# Patient Record
Sex: FEMALE | Race: WHITE | Employment: UNEMPLOYED | URBAN - METROPOLITAN AREA
[De-identification: names, ages, dates, MRNs, and addresses within clinical notes are randomized per-mention and may not be internally consistent; named-entity substitution may affect disease eponyms.]

---

## 2019-04-22 ENCOUNTER — APPOINTMENT (OUTPATIENT)
Dept: GENERAL RADIOLOGY | Age: 49
DRG: 885 | End: 2019-04-22
Payer: COMMERCIAL

## 2019-04-22 ENCOUNTER — HOSPITAL ENCOUNTER (INPATIENT)
Age: 49
LOS: 12 days | Discharge: HOME | DRG: 885 | End: 2019-05-04
Attending: EMERGENCY MEDICINE | Admitting: PSYCHIATRY & NEUROLOGY
Payer: COMMERCIAL

## 2019-04-22 DIAGNOSIS — R44.0 AUDITORY HALLUCINATIONS: ICD-10-CM

## 2019-04-22 DIAGNOSIS — R45.850 HOMICIDAL IDEATION: ICD-10-CM

## 2019-04-22 DIAGNOSIS — R45.851 SUICIDAL IDEATION: Primary | ICD-10-CM

## 2019-04-22 LAB
ABSOLUTE EOS #: 0 K/UL (ref 0–0.4)
ABSOLUTE IMMATURE GRANULOCYTE: ABNORMAL K/UL (ref 0–0.3)
ABSOLUTE LYMPH #: 1.5 K/UL (ref 1–4.8)
ABSOLUTE MONO #: 0.5 K/UL (ref 0.1–1.3)
ACETAMINOPHEN LEVEL: <5 UG/ML (ref 10–30)
ALBUMIN SERPL-MCNC: 4.8 G/DL (ref 3.5–5.2)
ALBUMIN/GLOBULIN RATIO: ABNORMAL (ref 1–2.5)
ALP BLD-CCNC: 61 U/L (ref 35–104)
ALT SERPL-CCNC: 14 U/L (ref 5–33)
AMPHETAMINE SCREEN URINE: NEGATIVE
ANION GAP SERPL CALCULATED.3IONS-SCNC: 22 MMOL/L (ref 9–17)
AST SERPL-CCNC: 16 U/L
BARBITURATE SCREEN URINE: NEGATIVE
BASOPHILS # BLD: 1 % (ref 0–2)
BASOPHILS ABSOLUTE: 0.1 K/UL (ref 0–0.2)
BENZODIAZEPINE SCREEN, URINE: NEGATIVE
BILIRUB SERPL-MCNC: 1.08 MG/DL (ref 0.3–1.2)
BUN BLDV-MCNC: 11 MG/DL (ref 6–20)
BUN/CREAT BLD: ABNORMAL (ref 9–20)
BUPRENORPHINE URINE: ABNORMAL
CALCIUM SERPL-MCNC: 9.4 MG/DL (ref 8.6–10.4)
CANNABINOID SCREEN URINE: NEGATIVE
CHLORIDE BLD-SCNC: 103 MMOL/L (ref 98–107)
CO2: 16 MMOL/L (ref 20–31)
COCAINE METABOLITE, URINE: NEGATIVE
CREAT SERPL-MCNC: 0.6 MG/DL (ref 0.5–0.9)
DIFFERENTIAL TYPE: ABNORMAL
EOSINOPHILS RELATIVE PERCENT: 0 % (ref 0–4)
ETHANOL PERCENT: <0.01 %
ETHANOL: <10 MG/DL
GFR AFRICAN AMERICAN: >60 ML/MIN
GFR NON-AFRICAN AMERICAN: >60 ML/MIN
GFR SERPL CREATININE-BSD FRML MDRD: ABNORMAL ML/MIN/{1.73_M2}
GFR SERPL CREATININE-BSD FRML MDRD: ABNORMAL ML/MIN/{1.73_M2}
GLUCOSE BLD-MCNC: 86 MG/DL (ref 70–99)
HCT VFR BLD CALC: 43.6 % (ref 36–46)
HEMOGLOBIN: 15.2 G/DL (ref 12–16)
IMMATURE GRANULOCYTES: ABNORMAL %
LYMPHOCYTES # BLD: 17 % (ref 24–44)
MCH RBC QN AUTO: 31.8 PG (ref 26–34)
MCHC RBC AUTO-ENTMCNC: 34.7 G/DL (ref 31–37)
MCV RBC AUTO: 91.5 FL (ref 80–100)
MDMA URINE: ABNORMAL
METHADONE SCREEN, URINE: POSITIVE
METHAMPHETAMINE, URINE: ABNORMAL
MONOCYTES # BLD: 5 % (ref 1–7)
NRBC AUTOMATED: ABNORMAL PER 100 WBC
OPIATES, URINE: NEGATIVE
OXYCODONE SCREEN URINE: NEGATIVE
PDW BLD-RTO: 14 % (ref 11.5–14.9)
PHENCYCLIDINE, URINE: NEGATIVE
PLATELET # BLD: 297 K/UL (ref 150–450)
PLATELET ESTIMATE: ABNORMAL
PMV BLD AUTO: 7.9 FL (ref 6–12)
POTASSIUM SERPL-SCNC: 4.2 MMOL/L (ref 3.7–5.3)
PROPOXYPHENE, URINE: ABNORMAL
RBC # BLD: 4.77 M/UL (ref 4–5.2)
RBC # BLD: ABNORMAL 10*6/UL
SALICYLATE LEVEL: <1 MG/DL (ref 3–10)
SEG NEUTROPHILS: 77 % (ref 36–66)
SEGMENTED NEUTROPHILS ABSOLUTE COUNT: 6.8 K/UL (ref 1.3–9.1)
SODIUM BLD-SCNC: 141 MMOL/L (ref 135–144)
TEST INFORMATION: ABNORMAL
TOTAL PROTEIN: 7 G/DL (ref 6.4–8.3)
TOXIC TRICYCLIC SC,BLOOD: ABNORMAL
TRICYCLIC ANTIDEP,URINE: NEGATIVE
TRICYCLIC ANTIDEPRESSANTS, UR: ABNORMAL
TSH SERPL DL<=0.05 MIU/L-ACNC: 2.31 MIU/L (ref 0.3–5)
WBC # BLD: 8.9 K/UL (ref 3.5–11)
WBC # BLD: ABNORMAL 10*3/UL

## 2019-04-22 PROCEDURE — 93005 ELECTROCARDIOGRAM TRACING: CPT

## 2019-04-22 PROCEDURE — 84443 ASSAY THYROID STIM HORMONE: CPT

## 2019-04-22 PROCEDURE — 85025 COMPLETE CBC W/AUTO DIFF WBC: CPT

## 2019-04-22 PROCEDURE — G0480 DRUG TEST DEF 1-7 CLASSES: HCPCS

## 2019-04-22 PROCEDURE — 36415 COLL VENOUS BLD VENIPUNCTURE: CPT

## 2019-04-22 PROCEDURE — 80307 DRUG TEST PRSMV CHEM ANLYZR: CPT

## 2019-04-22 PROCEDURE — 71046 X-RAY EXAM CHEST 2 VIEWS: CPT

## 2019-04-22 PROCEDURE — 1240000000 HC EMOTIONAL WELLNESS R&B

## 2019-04-22 PROCEDURE — 80053 COMPREHEN METABOLIC PANEL: CPT

## 2019-04-22 PROCEDURE — 99285 EMERGENCY DEPT VISIT HI MDM: CPT

## 2019-04-22 RX ORDER — LANOLIN ALCOHOL/MO/W.PET/CERES
50 CREAM (GRAM) TOPICAL DAILY
COMMUNITY
End: 2022-07-12 | Stop reason: ALTCHOICE

## 2019-04-22 RX ORDER — TRIFLUOPERAZINE HYDROCHLORIDE 2 MG/1
6 TABLET, FILM COATED ORAL 2 TIMES DAILY
Status: ON HOLD | COMMUNITY
End: 2019-04-23

## 2019-04-22 RX ORDER — ARIPIPRAZOLE 10 MG/1
10 TABLET ORAL DAILY
Status: ON HOLD | COMMUNITY
End: 2019-05-04 | Stop reason: HOSPADM

## 2019-04-22 SDOH — HEALTH STABILITY: MENTAL HEALTH: HOW OFTEN DO YOU HAVE A DRINK CONTAINING ALCOHOL?: NEVER

## 2019-04-22 ASSESSMENT — ENCOUNTER SYMPTOMS
VOMITING: 0
NAUSEA: 0
ABDOMINAL PAIN: 0
COUGH: 1

## 2019-04-23 PROBLEM — F20.9 SCHIZOPHRENIA (HCC): Status: ACTIVE | Noted: 2019-04-23

## 2019-04-23 PROCEDURE — 90792 PSYCH DIAG EVAL W/MED SRVCS: CPT | Performed by: NURSE PRACTITIONER

## 2019-04-23 PROCEDURE — 6370000000 HC RX 637 (ALT 250 FOR IP): Performed by: NURSE PRACTITIONER

## 2019-04-23 PROCEDURE — 1240000000 HC EMOTIONAL WELLNESS R&B

## 2019-04-23 RX ORDER — MAGNESIUM HYDROXIDE/ALUMINUM HYDROXICE/SIMETHICONE 120; 1200; 1200 MG/30ML; MG/30ML; MG/30ML
30 SUSPENSION ORAL EVERY 6 HOURS PRN
Status: DISCONTINUED | OUTPATIENT
Start: 2019-04-23 | End: 2019-05-04 | Stop reason: HOSPADM

## 2019-04-23 RX ORDER — ARIPIPRAZOLE 10 MG/1
10 TABLET ORAL DAILY
Status: DISCONTINUED | OUTPATIENT
Start: 2019-04-23 | End: 2019-04-24

## 2019-04-23 RX ORDER — NICOTINE 21 MG/24HR
1 PATCH, TRANSDERMAL 24 HOURS TRANSDERMAL DAILY
Status: DISCONTINUED | OUTPATIENT
Start: 2019-04-23 | End: 2019-04-24

## 2019-04-23 RX ORDER — TRIFLUOPERAZINE HYDROCHLORIDE 1 MG/1
6 TABLET, FILM COATED ORAL NIGHTLY
Status: DISCONTINUED | OUTPATIENT
Start: 2019-04-23 | End: 2019-05-01

## 2019-04-23 RX ORDER — ERGOCALCIFEROL 1.25 MG/1
50000 CAPSULE ORAL
Status: DISCONTINUED | OUTPATIENT
Start: 2019-04-23 | End: 2019-05-04 | Stop reason: HOSPADM

## 2019-04-23 RX ORDER — HYDROXYZINE 50 MG/1
50 TABLET, FILM COATED ORAL 3 TIMES DAILY PRN
Status: DISCONTINUED | OUTPATIENT
Start: 2019-04-23 | End: 2019-05-04 | Stop reason: HOSPADM

## 2019-04-23 RX ORDER — NICOTINE POLACRILEX 4 MG
15 LOZENGE BUCCAL SEE ADMIN INSTRUCTIONS
Status: ON HOLD | COMMUNITY
End: 2019-04-23

## 2019-04-23 RX ORDER — LANOLIN ALCOHOL/MO/W.PET/CERES
50 CREAM (GRAM) TOPICAL DAILY
Status: DISCONTINUED | OUTPATIENT
Start: 2019-04-23 | End: 2019-05-04 | Stop reason: HOSPADM

## 2019-04-23 RX ORDER — TRIFLUOPERAZINE HYDROCHLORIDE 2 MG/1
6 TABLET, FILM COATED ORAL NIGHTLY
COMMUNITY
End: 2020-09-22 | Stop reason: ALTCHOICE

## 2019-04-23 RX ORDER — ACETAMINOPHEN 325 MG/1
650 TABLET ORAL EVERY 4 HOURS PRN
Status: DISCONTINUED | OUTPATIENT
Start: 2019-04-23 | End: 2019-05-04 | Stop reason: HOSPADM

## 2019-04-23 RX ORDER — BENZTROPINE MESYLATE 1 MG/ML
2 INJECTION INTRAMUSCULAR; INTRAVENOUS 2 TIMES DAILY PRN
Status: DISCONTINUED | OUTPATIENT
Start: 2019-04-23 | End: 2019-05-04 | Stop reason: HOSPADM

## 2019-04-23 RX ORDER — TRAZODONE HYDROCHLORIDE 50 MG/1
50 TABLET ORAL NIGHTLY PRN
Status: DISCONTINUED | OUTPATIENT
Start: 2019-04-23 | End: 2019-05-04 | Stop reason: HOSPADM

## 2019-04-23 RX ADMIN — PYRIDOXINE HCL TAB 50 MG 50 MG: 50 TAB at 15:43

## 2019-04-23 RX ADMIN — VORTIOXETINE 10 MG: 10 TABLET, FILM COATED ORAL at 15:43

## 2019-04-23 RX ADMIN — ARIPIPRAZOLE 10 MG: 10 TABLET ORAL at 15:43

## 2019-04-23 RX ADMIN — Medication 1 MG: at 20:04

## 2019-04-23 RX ADMIN — TRIFLUOPERAZINE HYDROCHLORIDE 6 MG: 1 TABLET, FILM COATED ORAL at 20:08

## 2019-04-23 ASSESSMENT — SLEEP AND FATIGUE QUESTIONNAIRES
DIFFICULTY ARISING: NO
DIFFICULTY FALLING ASLEEP: YES
DIFFICULTY STAYING ASLEEP: YES
DO YOU HAVE DIFFICULTY SLEEPING: NO
AVERAGE NUMBER OF SLEEP HOURS: 7
SLEEP PATTERN: DIFFICULTY FALLING ASLEEP;EARLY AWAKENING;RESTLESSNESS
DO YOU USE A SLEEP AID: YES
RESTFUL SLEEP: YES

## 2019-04-23 ASSESSMENT — PAIN SCALES - GENERAL: PAINLEVEL_OUTOF10: 0

## 2019-04-23 ASSESSMENT — LIFESTYLE VARIABLES
HISTORY_ALCOHOL_USE: NO
HISTORY_ALCOHOL_USE: NO

## 2019-04-23 ASSESSMENT — PAIN - FUNCTIONAL ASSESSMENT: PAIN_FUNCTIONAL_ASSESSMENT: 0-10

## 2019-04-23 NOTE — CARE COORDINATION
BHI Biopsychosocial Assessment    Current Level of Psychosocial Functioning     Independent: x  Dependent:   Minimal Assist:      Psychosocial High Risk Factors (check all that apply)    Unable to obtain meds:    Chronic illness/pain:     Substance abuse:    Lack of Family Support:    Financial stress:    Isolation: x  Inadequate Community Resources: x  Suicide attempt(s): x  Not taking medications:     Victim of crime:    Developmental Delay:    Unable to manage personal needs:    Age 72 or older:    Homeless:    No transportation:    Readmission within 30 days:    Unemployment: x  Traumatic Event:        Psychiatric Advanced Directives: None reported    Family to Involve in Treatment: None reported    Sexual Orientation: ARMINDA    Patient Strengths: Pt has supportive family, is linked with outpatient provider. Patient Barriers: Pt is isolative. Opiate Education: N/A, pt denies opiate use    CMHC/mental health history: Pt is linked for outpatient services with [address] [place] [address]. Plan of Care   medication management, group/individual therapies, family meetings, psycho -education, treatment team meetings to assist with stabilization    Initial Discharge Plan: Pt to stabilize with medication, return home, continue outpatient services with [facility]      Clinical Summary:      Pt is a 51year old  female who presented to the ED with SI and AH, however pt did not verbalize having a plan. Pt states that she is medication compliant at this time. Pt is linked to [address] [place] [address]. Pt states that she lives with her  and daughter, whom is supportive. Pt does not disclose if she has a form of legal income. Pt states that she does not have a Hx of AoD use. Pt states that she does not have abuse issues from her past. Pt states that she does have MI in her family. Pt states that she has [address]. Pt states that she has graduated from [employer] and completed a bachelors in education.  Pt states that she is currently having AH, does not disclose what she hears. Pt denies VH, SI and HI. Pt states that she has attempted suicide in the past by OD at ages 22 and 24.

## 2019-04-23 NOTE — ED NOTES
Pt appears to be anxious as evidence by heavy breathing, clenching of pt's fists and toes. Pt stated pt felt like she was having a hard time breathing and swallowing. SW provided deep breathing psychoeducational intervention to assist client in reducing anxious symptoms. ED Dr richard.

## 2019-04-23 NOTE — BH NOTE
not disclose information )  Delusions: No(ARMINDA)  Delusions: Persecution  Memory:Normal: No  Memory: Poor Recent, Poor Remote  Insight and Judgment: No  Insight and Judgment: Poor Judgment, Poor Insight, Unrealistic, Unmotivated  Present Suicidal Ideation: (S) Other(See comment)(ARMINDA; PT would not answer question; When asked to contract for safety, pt nooded her head yes)  Present Homicidal Ideation: (S) Other(See comment)(ARMINDA; PT would not answer question; When asked to contract for safety, pt nooded her head yes)    Tobacco Screening:  Practical Counseling, on admission, liliana X, if applicable and completed (first 3 are required if patient doesn't refuse):            ( )  Recognizing danger situations (included triggers and roadblocks)                    ( )  Coping skills (new ways to manage stress, exercise, relaxation techniques, changing routine, distraction)                                                           ( )  Basic information about quitting (benefits of quitting, techniques in how to quit, available resources  ( ) Referral for counseling faxed to [facility]                                           ( ) Patient refused counseling  (x ) Patient has not smoked in the last 30 days      Pt admitted with followings belongings:  Dentures: None  Vision - Corrective Lenses: Glasses  Hearing Aid: None  Jewelry: Ring(2 rings)  Clothing: Footwear, Jacket / coat, Pants, Shirt, Pajamas, Undergarments (Comment), Other (Comment)(blue long sleeve shirt, hair tie,bra, brown bag, white pants w/string, red and blue jacket)  Were All Patient Medications Collected?: Not Applicable  Other Valuables: Money (Comment)($0.07)       Valuables placed in safe in security envelope, number:  [id number]. Patient's home medications were n/a. Patient oriented to surroundings and program expectations and copy of patient rights given. Received admission packet:  yes. Consents reviewed, signed yes. Refused no. Patient verbalize understanding:  yes. Patient education on precautions: yes      Upon admission, pt was a poor historian and was selective with her answers pertaining to her mental/physical health. PT was A&Ox3, and was able to voluntairly sign in after explaining the form to her. When RN began his assessment, PT started displaying increased thought blocking, only answering certain questions. When questions pertained to her mental/physical health, Pt would increasingly avoid eye contact and gaze around the room while Harlene Patience was waiting for response, refusing to answer certain questions. Pt refused most of her physical assessment. PT was changed out; Pt was wanded for metal objects on unit. Pt would not answer any homicidal or suicidal questions pertaining to her ideations, but pt did nod her head yes when she was asked to contract for her safety, agreeing to seek out health care staff if her thoughts to harm self/others increased. PT did admit to voices telling her things but would not disclose what these things where. PT admitted to both depression and anxiety. PT stated she sleeps and eats okay. PT denied any recent drinking or drug use. PT denied being a current tobacco user. IT was reported via ed that pt may not have taken all of her medications this day. When RN asked ED if this could be validated, they were unable to validate this. PT was questioned about her medications upon admit and when asked, pt shook her head yes to taking her medications that day. When reviewing what medications these were, pt would not answer RN, and began gazing around the room again. When RN asked what pharmacy pt utilized, PT was able to immediately respond and reported using [facility] [email or web address] for her medications which is closed at this time. PT was accepting of PM food and ate a meal. PT was shown to her room, pt laid down, and when asked, no issues were expressed at that time.  PT safety will continue per hospital protocol.                          Gail Monique RN

## 2019-04-23 NOTE — PROGRESS NOTES

## 2019-04-23 NOTE — PLAN OF CARE
[facility]  Initial Interdisciplinary Treatment Plan NO      Original treatment plan Date & Time: 4/23/19 0930    Admission Type:  Admission Type: Involuntary(Pinked, pt signed in)    Reason for admission:   Reason for Admission: (+Suicidal Ideations, +Homicidal Ideations, +command auditory hallucinations;  Pt would not disclose any futher information pertaining to this; also reported medications not working)    Estimated Length of Stay:  5-7days  Estimated Discharge Date: to be determined by physician    PATIENT STRENGTHS:  Patient Strengths:Strengths: Connection to output provider, Positive Support(harbor; +supportive ; reported med compliant)  Patient Strengths and Limitations:Limitations: Difficulty problem solving/relies on others to help solve problems, Tendency to isolate self, Apathetic / unmotivated, Difficult relationships / poor social skills, Unrealistic self-view, Demonstrates discomfort with /lack of social skills  Addictive Behavior: Addictive Behavior  In the past 3 months, have you felt or has someone told you that you have a problem with:  : None  Do you have a history of Chemical Use?: No(denied current/recent)  Do you have a history of Alcohol Use?: No(denied current/recent)  Do you have a history of Street Drug Abuse?: No(denied current/recent)  Histroy of Prescripton Drug Abuse?: No(denied current/recent)  Medical Problems:  Past Medical History:   Diagnosis Date    Bipolar 1 disorder ([facility] [address]     Depressed      Status EXAM:Status and Exam  Normal: No  Facial Expression: Avoids Gaze, Expressionless, Flat, Worried, Sad  Affect: Constricted, Stable  Level of Consciousness: Alert  Mood:Normal: No  Mood: Depressed, Anxious, Sad, Suspicious, Other (Comment)  Motor Activity:Normal: Yes  Interview Behavior: Evasive, Uncooperative/Withdrawn  Preception: Brice to Person, Brice to Time, Brice to Place  Attention:Normal: No  Attention: Distractible, Unable to Concentrate  Thought Processes: (S) Blocking  Thought Content:Normal: No  Thought Content: Paranoia, Poverty of Content, Preoccupations  Hallucinations: Auditory (Comment), Command(Comment)(Pt would not disclose information )  Delusions: No(ARMINDA)  Delusions: Persecution  Memory:Normal: No  Memory: Poor Recent, Poor Remote  Insight and Judgment: No  Insight and Judgment: Poor Judgment, Poor Insight, Unrealistic, Unmotivated  Present Suicidal Ideation: (S) Other(See comment)(ARMINDA; PT would not answer question; When asked to contract for safety, pt nooded her head yes)  Present Homicidal Ideation: (S) Other(See comment)(ARMINDA; PT would not answer question;  When asked to contract for safety, pt nooded her head yes)    EDUCATION:   Learner Progress Toward Treatment Goals: reviewed group plans and strategies for care    Method:group therapy, medication compliance, individualized assessments and care planning    Outcome: needs reinforcement    PATIENT GOALS: to be discussed with patient within 72 hours    PLAN/TREATMENT RECOMMENDATIONS:     continue group therapy , medications compliance, goal setting, individualized assessments and care, continue to monitor pt on unit      SHORT-TERM GOALS:   Time frame for Short-Term Goals: 5-7 days    LONG-TERM GOALS:  Time frame for Long-Term Goals: 6 months  Members Present in Team Meeting: See [address] [place] [address]

## 2019-04-23 NOTE — GROUP NOTE
Group Therapy Note    Date: April 23    Group Start Time: 1430  Group End Time: 1510  Group Topic: Cognitive Skills    STCZ MERY Catherine, CTRS    Pt did not attend RT group at 1430 d/t resting in room despite staff invitation to attend.       Signature:  Newton Baker

## 2019-04-23 NOTE — ED NOTES
Provisional Diagnosis:  Schizophrenia    Psychosocial and Contextual Factors: Pt has issues with social enviroment. Pt has issues with relationships. C-SSRS Summary:    Patient: X    Family:     Agency: X (EPIC)    Present Suicidal Behavior:     Verbal: Pt is suicidal.     Attempt:     Past Suicidal Behavior:     Verbal:     Attempt: Pt attempted suicide at age 22 by trying to OD on pills. Self- Injurious/ Self-Mutilation:  ARMINDA due to pt's paranoia. Trauma History: ARMINDA due to pt's paranoia. Protective Factors: Pt has support. Pt has housing. Pt is linked. Pt has insurance. Risk Factors: Pt has poor judgement and coping skills. Pt does not take her medication properly at times. Substance Abuse: Pt denies    Clinical Summary:  Horace Couch is a 51year old female who presents to the ED via pt's . Pt is guarded. Pt is withdrawn. Pt's thought process is blocking. Pt admits to having SI/HI. When asked for further detail about pt's SI/HI, pt responds by saying \"I can't talk about it. \" Pt reports hearing voices but cannot say what they are saying to pt. Pt attempted suicide at age 22 by trying to OD on pills. Pt denies the use of alcohol and illegal drugs. Pt has been diagnosed with Schizoaffective disorder in the past. Pt reports pt is med compliant but sometimes misses her dose of Abilify. Pt's medications dont seem to be working for pt anymore, per pt's . Pt is linked with [facility]. Pt reports past psychiatric admissions at [facility]. Pt reports poor sleep and pt has had a decrease in her appetite, per pt's . Level of Care Disposition:.JOHANNA consulted with Dallas RUSSO CNP from psychiatry. Pt accepted for an inpatient admission to the [facility] for safety and stabilization.

## 2019-04-23 NOTE — GROUP NOTE
Group Therapy Note    Date: April 23    Group Start Time: 1600  Group End Time: 1630  Group Topic: Group Therapy    [facility] MERY [facility]        Group Therapy Note    Attendees: 7         Patient's Goal:  Increased positive coping skills    Notes:  Coping skills group    Status After Intervention:  Improved    Participation Level:  Active Listener    Participation Quality: Appropriate      Speech:  normal      Thought Process/Content: Logical      Affective Functioning: Congruent      Mood: euthymic      Level of consciousness:  Alert and Oriented x4      Response to Learning: Able to verbalize current knowledge/experience      Endings: None Reported    Modes of Intervention: Education      Discipline Responsible: [address], [employer]      Signature:  Bisi Chao

## 2019-04-23 NOTE — GROUP NOTE
Group Therapy Note    Date: April 23    Group Start Time: 0900  Group End Time: 6244  Group Topic: [address], [place]        Group Therapy Note    Attendees: 3/15         Patient's Goal:  To increase socialization    Notes:  Patient attended group and participated fully    Status After Intervention:  Improved    Participation Level:  Active Listener and Interactive    Participation Quality: Appropriate and Attentive      Speech:  normal      Thought Process/Content: Logical      Affective Functioning: Blunted      Mood: depressed      Level of consciousness:  Alert and Oriented x4      Response to Learning: Progressing to goal      Endings: None Reported    Modes of Intervention: Education, Socialization, Clarifying, Problem-solving and Reality-testing      Discipline Responsible: Psychoeducational Specialist      Signature:  Inga Ponce

## 2019-04-23 NOTE — BH NOTE
Psychiatric Admission Note Nurse Practitioner     Lety Hare is a 51 y.o. female who was voluntarily admitted from the [place] [address] for suicidal and homicidal thoughts. Today Joy Meckel is interviewed in the day room. She has been observed pacing in the milieu and does not socialize with others. Upon approach, she is paranoid and suspicious with avoidant eye contact. She is dressed in [address] She is admitting to voices to \"be patient\" and to \"kill\". She states taht she has only missed one dose of medication but that when she does not take her medication, the voices come back. She is admitting to fleeting SI, no plans, HI but will not disclose additional details. She becomes tearful when answering questions and becomes guarded and withdrawn. She admits to depression that is controlled with her medication denying anxiety. She has significant thought blocking and when asked regarding scheduled drug use, she shut down and refused to continue our interview. This is the result of a positive methadone result in her urine. Chart and medications reviewed. Therapeutic support, empathetic care and psycho education provided greater than 20 minutes. At this time there is no safe alternative other than inpatient care. Past Psychiatric History   Patient reports current outpatient psychiatric linkage. . Reported history of psychiatric inpatient hospitalizations. Reported history of suicide attempts. History of Substance Abuse     Patient denies cigarette, marijuana and street drug use although her urine was positive for methodone admitting to social ETOH use.     Family History of psychiatric disorders    Family history: positive for Depression and schizophrenia      Medical History   Allergies:  Strawberry extract   Past Medical History:   Diagnosis Date    Bipolar 1 disorder ([facility] [address]     Depressed       Past Surgical History:   Procedure Laterality Date    APPENDECTOMY      CHOLECYSTECTOMY      HERNIA REPAIR  LAPAROSCOPY         Neurologic Exam      Mental Status   Oriented to person, place, and time. Oriented to city, area, street and number. Oriented to country. Registration: recalls 3 of 3 objects. Recall at 5 minutes: recalls 3 of 3 objects. Follows 3 step commands. Attention: normal. Concentration: normal.   Speech: speech is normal   Level of consciousness: alert  Knowledge: good. Able to perform simple calculations. Able to name object. Able to read. Able to repeat. Able to write. Normal comprehension.      Cranial Nerves   Cranial nerves II through XII intact.      Motor Exam   Muscle bulk: normal  Overall muscle tone: normal     Strength   Strength 5/5 throughout.      Sensory Exam   Light touch normal.      Gait, Coordination, and Reflexes      Normal     Coordination   Romberg: negative     Tremor   Resting tremor: absent  Intention tremor: absent  Action tremor: absent     Reflexes   Right brachioradialis: 2+  Left brachioradialis: 2+  Right biceps: 2+  Left biceps: 2+  Right triceps: 2+  Left triceps: 2+  Right patellar: 2+  Left patellar: 2+  Right achilles: 2+  Left achilles: 2+  Right : 2+  Left : 2+    SOCIAL HISTORY: Patient lives with her  and 25 y.o. Daughter, she has a 21 y.o. Daughter who is in college. She has a Bachelors degree and is a stay-at-home Mom.   Social History     Socioeconomic History    Marital status:      Spouse name: Not on file    Number of children: Not on file    Years of education: Not on file    Highest education level: Not on file   Occupational History    Not on file   Social Needs    Financial resource strain: Not on file    Food insecurity:     Worry: Not on file     Inability: Not on file    Transportation needs:     Medical: Not on file     Non-medical: Not on file   Tobacco Use    Smoking status: Never Smoker    Smokeless tobacco: Never Used   Substance and Sexual Activity    Alcohol use: Never     Frequency: Never    Drug

## 2019-04-23 NOTE — ED NOTES
SW informed pt, psychiatry would like for pt to be admitted to the [facility] and asked if pt is willing to voluntarily sign self in. Pt began clenching/unclenching fists and toes while breathing heavy. Pt continuously looking at writer then back down to the ground and at pt's fingers. Pt remained non verbal and would not answer any of this writer's questions. ED Dr richard.

## 2019-04-23 NOTE — H&P
HISTORY and [address] [address] [address]       NAME:  Catherine Blakely  MRN: [id number]   YOB: 1970   Date: 4/23/2019   Age: 51 y.o. Gender: female     COMPLAINT AND PRESENT HISTORY:      Catherine Blakely is 51 y.o.,  female, admitted because of Schizoaffective Disorder/ Schizophrenia. Patient has auditory hallucinations, patient hears command voices to kill self, but not others. Patient admits to having auditory hallucinations for years, recently the voices have gotten worse. Patient denies any  visual hallucinations. No tactile hallucinations  Pt has suicidal ideation. Patient states that she doesn't want to answer what plans she has. Pt doesn't deny  any homicidal ideation. Pt  States that she may have ideations, \" I try to have self control\" . Pt has a history of previous suicide attempts by overdose at age 22. Pt has poor insight. Patient has periods of thought blocking and gazing . Patient at times would not answer questions asked. Pt has poor sleep, loss of appetite, poor concentration and memory. Patient denies any current alcohol or substance abuse. Patient lives alone with spouse and daughter. .   Pt has been  compliant with the psychiatric medications. No somatic complaints.        DIAGNOSTIC RESULTS   Labs:  CBC:   Recent Labs     04/22/19 2045   WBC 8.9   HGB 15.2        BMP:    Recent Labs     04/22/19 2045      K 4.2      CO2 16*   BUN 11   CREATININE 0.60   GLUCOSE 86     Hepatic:   Recent Labs     04/22/19 2045   AST 16   ALT 14   BILITOT 1.08   ALKPHOS 64         PAST MEDICAL HISTORY     Past Medical History:   Diagnosis Date    Bipolar 1 disorder ([facility] [address]     Depressed        Pt denies any history of Diabetes mellitus type 2, hypertension, stroke, heart disease, COPD, Asthma, GERD, HLD, Cancer, Seizures,Thyroid disease, Kidney Disease, Hepatitis, TB.    SURGICAL HISTORY       Past Surgical History:   Procedure Laterality Date  APPENDECTOMY      CHOLECYSTECTOMY      HERNIA REPAIR      LAPAROSCOPY         FAMILY HISTORY     History reviewed. No pertinent family history. SOCIAL HISTORY       Social History     Socioeconomic History    Marital status:      Spouse name: None    Number of children: None    Years of education: None    Highest education level: None   Occupational History    None   Social Needs    Financial resource strain: None    Food insecurity:     Worry: None     Inability: None    Transportation needs:     Medical: None     Non-medical: None   Tobacco Use    Smoking status: Never Smoker    Smokeless tobacco: Never Used   Substance and Sexual Activity    Alcohol use: Never     Frequency: Never    Drug use: Not Currently    Sexual activity: None   Lifestyle    Physical activity:     Days per week: None     Minutes per session: None    Stress: None   Relationships    Social connections:     Talks on phone: None     Gets together: None     Attends Buddhism service: None     Active member of club or organization: None     Attends meetings of clubs or organizations: None     Relationship status: None    Intimate partner violence:     Fear of current or ex partner: None     Emotionally abused: None     Physically abused: None     Forced sexual activity: None   Other Topics Concern    None   Social History Narrative    None           REVIEW OF SYSTEMS      Allergies   Allergen Reactions    Strawberry Extract        No current facility-administered medications on file prior to encounter.       Current Outpatient Medications on File Prior to Encounter   Medication Sig Dispense Refill    trifluoperazine (STELAZINE) 2 MG tablet Take 6 mg by mouth nightly      VORTIoxetine (TRINTELLIX) 10 MG TABS tablet Take 10 mg by mouth daily      ARIPiprazole (ABILIFY) 10 MG tablet Take 10 mg by mouth daily      Melatonin-Pyridoxine (MELATIN PO) Take 10 mg by mouth      vitamin B-6 (PYRIDOXINE) 50 MG tablet Take 50 mg by mouth daily      vitamin D (CHOLECALCIFEROL) 1000 UNIT TABS tablet Take 50,000 Units by mouth Twice a Week                         General health:  Fairly good. No fever or chills. Skin:  No itching, redness or rash. Head, eyes, ears, nose, throat:  No headache, epistaxis, rhinorrhea hearing loss or sore throat. Neck:  No pain, stiffness or masses. Cardiovascular/Respiratory system:  No chest pain, palpitation, shortness of breath, coughing or expectoration. Gastrointestinal tract: No abdominal pain, nausea, vomiting, diarrhea or constipation. Genitourinary:  No burning on micturition. No hesitancy, urgency, frequency or discoloration of urine. Locomotor:  No bone or joint pains. No swelling or deformities. Neuropsychiatric:  See HPI. GENERAL PHYSICAL EXAM:     Vitals: BP (!) 130/47   Pulse 87   Temp 98.7 °F (37.1 °C)   Resp 14   Ht 5' 6\" (1.676 m)   Wt 183 lb (83 kg)   SpO2 97%   BMI 29.54 kg/m²  Body mass index is 29.54 kg/m². Pt was examined with a nurse present in the room. GENERAL APPEARANCE:  Brenden Sheehan is 51 y.o.,  female, moderately obese, nourished, conscious, alert. Does not appear to be distress or pain at this time. SKIN:  Warm, dry, no cyanosis or jaundice. HEAD:  Normocephalic, atraumatic, no swelling or tenderness. EYES:  Pupils equal, reactive to light, Conjunctiva is clear, EOMs intact debra. eyelids WNL. EARS:  No discharge, no marked hearing loss. NOSE:  No rhinorrhea, epistaxis or septal deformity. THROAT:  Not congested. No ulceration bleeding or discharge. NECK:  No stiffness, trachea central.  No palpable masses or L.N.      CHEST:  Symmetrical and equal on expansion. HEART:  Regular rate and rhythm. S1 > S2, No audible murmurs or gallops. LUNGS:  Equal on expansion, normal breath sounds. No adventitious sounds. ABDOMEN:  Obese.  Soft on palpation. No localized tenderness. No guarding or rigidity. No palpable organomegaly. LYMPHATICS:  No palpable cervical Lymphadenopathy. LOCOMOTOR, BACK AND SPINE:  No tenderness or deformities. EXTREMITIES:  Symmetrical, no pretibial edema. Maicols sign negative. No discoloration or ulcerations. NEUROLOGIC:  The patient is conscious, alert, oriented,Cranial nerve II-XII intact, taste was not examined. No apparent focal sensory or motor deficits. Muscle strength equal Teddy. No facial droop, tongue protrudes centrally, no slurring of the speech. Noted involuntary tremors to the right hand. PROVISIONAL DIAGNOSES:      Active Problems:    Schizophrenia ([facility] [address]    Suicidal ideation    Schizoaffective disorder, depressive type ([facility] [address]  Resolved Problems:    * No resolved hospital problems.  *      RAFAELA CARLSON - CNP on 4/23/2019 at 4:49 PM

## 2019-04-23 NOTE — GROUP NOTE
Group Therapy Note    Date: April 23    Group Start Time: 1100  Group End Time: 1130  Group Topic: Cognitive Skills    GALO Higginbotham, CTRS        Group Therapy Note    Pt did not attend Therapeutic Recreation at 1100 d/t resting in room despite staff invitation to attend.

## 2019-04-23 NOTE — ED PROVIDER NOTES
[facility]  eMERGENCY dEPARTMENT eNCOUnter      Pt Name: Courtney Gerardo  MRN: [id number]  [place] 1970  Date of evaluation: 4/22/19    CHIEF COMPLAINT       Chief Complaint   Patient presents with    Mental Health Problem     HISTORY OF PRESENT ILLNESS   HPI 51 y.o. female presents with complaints of hallucinations and suicidal thoughts. The patient states that over the last month she's been having auditory hallucinations. She's been hearing voices. She states that voices are telling her to harm herself and others. When asked about suicidal intention or plan she shakes her head yes, when I asked her to elaborate she says \"I can't tell you\". She denies any attempt at self-harm at this time. She refuses to discuss her homicidal thoughts. She denies any attempt of pill overdose or ingestion. She admits to missing some of her doses of Abilify. She reports a previous history of suicide attempt when she was 18 by pill overdose. She denies any recreational drug abuse. The patient states that she was recently sick with a cold, but she said that those symptoms have resolved though she still does have a mild cough. REVIEW OF SYSTEMS     Review of Systems   Constitutional: Negative for chills and fever. HENT: Negative for congestion. Eyes: Negative for visual disturbance. Respiratory: Positive for cough. Cardiovascular: Negative for chest pain. Gastrointestinal: Negative for abdominal pain, nausea and vomiting. Genitourinary: Negative for dysuria. Musculoskeletal: Negative for arthralgias. Skin: Negative for rash. Neurological: Negative for headaches. Hematological: Negative for adenopathy. Psychiatric/Behavioral: Positive for decreased concentration, dysphoric mood, hallucinations, sleep disturbance and suicidal ideas. Negative for self-injury. The patient is nervous/anxious.       PAST MEDICAL HISTORY     Past Medical History:   Diagnosis Date    Bipolar 1 disorder ([facility] [address]     Depressed        SURGICAL HISTORY       Past Surgical History:   Procedure Laterality Date    APPENDECTOMY      CHOLECYSTECTOMY      HERNIA REPAIR      LAPAROSCOPY         CURRENT MEDICATIONS       Current Discharge Medication List      CONTINUE these medications which have NOT CHANGED    Details   trifluoperazine (STELAZINE) 2 MG tablet Take 6 mg by mouth 2 times daily Take 3 2mg tablet daily      VORTIoxetine (TRINTELLIX) 10 MG TABS tablet Take 10 mg by mouth daily      ARIPiprazole (ABILIFY) 10 MG tablet Take 10 mg by mouth daily      Melatonin-Pyridoxine (MELATIN PO) Take 10 mg by mouth      vitamin B-6 (PYRIDOXINE) 50 MG tablet Take 50 mg by mouth daily      vitamin D (CHOLECALCIFEROL) 1000 UNIT TABS tablet Take 1,000 Units by mouth daily             ALLERGIES     has No Known Allergies. FAMILY HISTORY     has no family status information on file. SOCIAL HISTORY      reports that she has never smoked. She does not have any smokeless tobacco history on file. She reports that she has current or past drug history. She reports that she does not drink alcohol.     PHYSICAL EXAM     INITIAL VITALS: /66   Pulse 82   Temp 98.4 °F (36.9 °C)   Resp 16   Ht 5' 6\" (1.676 m)   Wt 183 lb (83 kg)   SpO2 97%   BMI 29.54 kg/m²   Gen.: NAD  Head: Normocephalic, atraumatic  Eye: Pupils equal round reactive to light, no conjunctivitis  Neck: No JVD or adenopathy   Heart: Regular rate and rhythm no murmurs  Lungs: Clear to auscultation bilaterally, no respiratory distress  Chest wall: No crepitus, no tenderness palpation  Abdomen: Soft, nontender, nondistended, with no peritoneal signs  Neurologic: Patient is alert and oriented x3, motor and sensation is intact in all 4 extremities,  fluent speech  Extremities: Full range of motion, no cyanosis, no edema, no signs of trauma, no tenderness to palpation    MEDICAL DECISION MAKING:     MDM   51year-old presenting with complaints of hallucinations, suicidal/homicidal thoughts. Does not appear intoxicated. Denies any attempt at self-harm or pill overdose. Will check her CBC, renal function, electrolytes, a tox screen, we'll get a baseline EKG, given this cough and URI we'll get a chest x-ray. Hospital course:    Laboratory studies reviewed and there are no significant abnormalities. Chest x-ray is unremarkable. Patient is medically clear for psychiatric evaluation. DIAGNOSTIC RESULTS     EKG: All EKG's are interpreted by the Emergency Department Physician who either signs or Co-signs this chart in the absence of a cardiologist.    EKG shows a sinus rhythm. HR is 70, , QRS 86, , no DANIEL, No STD, No TWI, the axis is normal.      RADIOLOGY:All plain film, CT, MRI, and formal ultrasound images (except ED bedside ultrasound) are read by the radiologist and the images and interpretations are directly viewed by the emergency physician. XR CHEST STANDARD (2 VW)   Final Result   No acute cardiopulmonary process. LABS: All lab results were reviewed by myself, and all abnormals are listed below.   Labs Reviewed   CBC WITH AUTO DIFFERENTIAL - Abnormal; Notable for the following components:       Result Value    Seg Neutrophils 77 (*)     Lymphocytes 17 (*)     All other components within normal limits   COMPREHENSIVE METABOLIC PANEL - Abnormal; Notable for the following components:    CO2 16 (*)     Anion Gap 22 (*)     All other components within normal limits   TOX SCR, BLD, ED - Abnormal; Notable for the following components:    Salicylate Lvl <1 (*)     Acetaminophen Level <5 (*)     All other components within normal limits   URINE DRUG SCREEN - Abnormal; Notable for the following components:    Methadone Screen, Urine POSITIVE (*)     All other components within normal limits   TSH WITH REFLEX   DRUG SCREEN TRICYCLIC URINE       EMERGENCY DEPARTMENT COURSE:   Vitals:    Vitals:    04/22/19 2003 04/22/19 2309   BP: 116/84 125/66   Pulse: 115 82   Resp: 16 16   Temp: 98.4 °F (36.9 °C) 98.4 °F (36.9 °C)   TempSrc: Oral    SpO2: 96% 97%   Weight: 183 lb (83 kg)    Height: 5' 6\" (1.676 m)        The patient was given the following medications while in the emergency department:  No orders of the defined types were placed in this encounter. -------------------------  CRITICAL CARE:   CONSULTS: IP CONSULT TO HOSPITALIST  PROCEDURES: Procedures     FINAL IMPRESSION      1. Suicidal ideation    2. Homicidal ideation    3. Auditory hallucinations          DISPOSITION/PLAN   DISPOSITION Decision To Admit 04/22/2019 10:36:44 PM      PATIENT REFERRED TO:  No follow-up provider specified.     DISCHARGE MEDICATIONS:  Current Discharge Medication List            Andre Penn MD  Attending Emergency Physician                      Andre Penn MD  04/22/19 0118

## 2019-04-24 LAB
ABSOLUTE EOS #: 0 K/UL (ref 0–0.4)
ABSOLUTE IMMATURE GRANULOCYTE: ABNORMAL K/UL (ref 0–0.3)
ABSOLUTE LYMPH #: 1.3 K/UL (ref 1–4.8)
ABSOLUTE MONO #: 0.3 K/UL (ref 0.1–1.3)
ALBUMIN SERPL-MCNC: 4.2 G/DL (ref 3.5–5.2)
ALBUMIN/GLOBULIN RATIO: ABNORMAL (ref 1–2.5)
ALP BLD-CCNC: 54 U/L (ref 35–104)
ALT SERPL-CCNC: 12 U/L (ref 5–33)
ANION GAP SERPL CALCULATED.3IONS-SCNC: 14 MMOL/L (ref 9–17)
AST SERPL-CCNC: 18 U/L
BASOPHILS # BLD: 1 % (ref 0–2)
BASOPHILS ABSOLUTE: 0 K/UL (ref 0–0.2)
BILIRUB SERPL-MCNC: 1.03 MG/DL (ref 0.3–1.2)
BUN BLDV-MCNC: 7 MG/DL (ref 6–20)
BUN/CREAT BLD: ABNORMAL (ref 9–20)
CALCIUM SERPL-MCNC: 9.3 MG/DL (ref 8.6–10.4)
CHLORIDE BLD-SCNC: 106 MMOL/L (ref 98–107)
CHOLESTEROL/HDL RATIO: 2.7
CHOLESTEROL: 118 MG/DL
CO2: 22 MMOL/L (ref 20–31)
CREAT SERPL-MCNC: 0.41 MG/DL (ref 0.5–0.9)
DIFFERENTIAL TYPE: ABNORMAL
EOSINOPHILS RELATIVE PERCENT: 1 % (ref 0–4)
ESTIMATED AVERAGE GLUCOSE: 80 MG/DL
GFR AFRICAN AMERICAN: >60 ML/MIN
GFR NON-AFRICAN AMERICAN: >60 ML/MIN
GFR SERPL CREATININE-BSD FRML MDRD: ABNORMAL ML/MIN/{1.73_M2}
GFR SERPL CREATININE-BSD FRML MDRD: ABNORMAL ML/MIN/{1.73_M2}
GLUCOSE BLD-MCNC: 108 MG/DL (ref 70–99)
HBA1C MFR BLD: 4.4 % (ref 4–6)
HCT VFR BLD CALC: 40.2 % (ref 36–46)
HDLC SERPL-MCNC: 44 MG/DL
HEMOGLOBIN: 13.9 G/DL (ref 12–16)
IMMATURE GRANULOCYTES: ABNORMAL %
LDL CHOLESTEROL: 65 MG/DL (ref 0–130)
LYMPHOCYTES # BLD: 24 % (ref 24–44)
MCH RBC QN AUTO: 31 PG (ref 26–34)
MCHC RBC AUTO-ENTMCNC: 34.6 G/DL (ref 31–37)
MCV RBC AUTO: 89.6 FL (ref 80–100)
MONOCYTES # BLD: 6 % (ref 1–7)
NRBC AUTOMATED: ABNORMAL PER 100 WBC
PDW BLD-RTO: 13.6 % (ref 11.5–14.9)
PLATELET # BLD: 228 K/UL (ref 150–450)
PLATELET ESTIMATE: ABNORMAL
PMV BLD AUTO: 7.8 FL (ref 6–12)
POTASSIUM SERPL-SCNC: 4 MMOL/L (ref 3.7–5.3)
RBC # BLD: 4.49 M/UL (ref 4–5.2)
RBC # BLD: ABNORMAL 10*6/UL
SEG NEUTROPHILS: 68 % (ref 36–66)
SEGMENTED NEUTROPHILS ABSOLUTE COUNT: 3.6 K/UL (ref 1.3–9.1)
SODIUM BLD-SCNC: 142 MMOL/L (ref 135–144)
THYROXINE, FREE: 1.58 NG/DL (ref 0.93–1.7)
TOTAL PROTEIN: 6.1 G/DL (ref 6.4–8.3)
TRIGL SERPL-MCNC: 43 MG/DL
TSH SERPL DL<=0.05 MIU/L-ACNC: 3 MIU/L (ref 0.3–5)
VLDLC SERPL CALC-MCNC: NORMAL MG/DL (ref 1–30)
WBC # BLD: 5.2 K/UL (ref 3.5–11)
WBC # BLD: ABNORMAL 10*3/UL

## 2019-04-24 PROCEDURE — 6370000000 HC RX 637 (ALT 250 FOR IP): Performed by: NURSE PRACTITIONER

## 2019-04-24 PROCEDURE — 36415 COLL VENOUS BLD VENIPUNCTURE: CPT

## 2019-04-24 PROCEDURE — 1240000000 HC EMOTIONAL WELLNESS R&B

## 2019-04-24 PROCEDURE — 80053 COMPREHEN METABOLIC PANEL: CPT

## 2019-04-24 PROCEDURE — 99232 SBSQ HOSP IP/OBS MODERATE 35: CPT | Performed by: NURSE PRACTITIONER

## 2019-04-24 PROCEDURE — 85025 COMPLETE CBC W/AUTO DIFF WBC: CPT

## 2019-04-24 PROCEDURE — 84439 ASSAY OF FREE THYROXINE: CPT

## 2019-04-24 PROCEDURE — 90833 PSYTX W PT W E/M 30 MIN: CPT | Performed by: NURSE PRACTITIONER

## 2019-04-24 PROCEDURE — 84443 ASSAY THYROID STIM HORMONE: CPT

## 2019-04-24 PROCEDURE — 83036 HEMOGLOBIN GLYCOSYLATED A1C: CPT

## 2019-04-24 PROCEDURE — 80061 LIPID PANEL: CPT

## 2019-04-24 RX ORDER — ARIPIPRAZOLE 15 MG/1
15 TABLET ORAL DAILY
Status: DISCONTINUED | OUTPATIENT
Start: 2019-04-25 | End: 2019-04-30

## 2019-04-24 RX ADMIN — TRIFLUOPERAZINE HYDROCHLORIDE 6 MG: 1 TABLET, FILM COATED ORAL at 21:22

## 2019-04-24 RX ADMIN — Medication 1 MG: at 21:23

## 2019-04-24 RX ADMIN — VORTIOXETINE 10 MG: 10 TABLET, FILM COATED ORAL at 08:50

## 2019-04-24 RX ADMIN — ARIPIPRAZOLE 10 MG: 10 TABLET ORAL at 08:50

## 2019-04-24 RX ADMIN — PYRIDOXINE HCL TAB 50 MG 50 MG: 50 TAB at 08:51

## 2019-04-24 RX ADMIN — HYDROXYZINE HYDROCHLORIDE 50 MG: 50 TABLET, FILM COATED ORAL at 08:51

## 2019-04-24 NOTE — GROUP NOTE
Group Therapy Note    Date: April 23    Group Start Time: 2015  Group End Time: 2045  Group Topic: Wrap-Up    GALO Giron RN        Group Therapy Note    Attendees: 4/12         Patient did not participate in  [address] [place] [address] group,  After invitation given. Patient remain seclusive to self. Every 15 minute checks maintained.

## 2019-04-24 NOTE — GROUP NOTE
Group Therapy Note    Date: April 24    Group Start Time: 1000  Group End Time: 6277  Group Topic: Group Therapy    [facility]    BRENNEN Yarbrough LSW        Group Therapy Note    Attendees: 5         Patient's Goal:  Patient will demonstrate increased interpersonal interaction    Notes:  Pt was an active participant    Status After Intervention:  Unchanged    Participation Level:  Active Listener and Interactive    Participation Quality: Appropriate, Attentive, Sharing and Supportive      Speech:  normal      Thought Process/Content: Logical      Affective Functioning: Congruent      Mood: anxious      Level of consciousness:  Alert, Oriented x4 and Attentive      Response to Learning: Able to verbalize current knowledge/experience, Able to change behavior and Progressing to goal      Endings: None Reported    Modes of Intervention: Support, Socialization, Clarifying and Problem-solving      Discipline Responsible: /Counselor      Signature:  BRENNEN Yarbrough LSW

## 2019-04-24 NOTE — PLAN OF CARE
[facility]  Day 3 Interdisciplinary Treatment Plan NOTE    Review Date & Time: 4/24/19  2700    Admission Type:   Admission Type: Involuntary(Pinked, pt signed in)    Reason for admission:  Reason for Admission: (+Suicidal Ideations, +Homicidal Ideations, +command auditory hallucinations;  Pt would not disclose any futher information pertaining to this; also reported medications not working)  Estimated Length of Stay: 5-7 days  Estimated Discharge Date Update: to be determined by physician    PATIENT STRENGTHS:  Patient Strengths Strengths: Connection to output provider, Positive Support, Social Skills, Medication Compliance  Patient Strengths and Limitations:Limitations: Difficulty problem solving/relies on others to help solve problems  Addictive Behavior:Addictive Behavior  In the past 3 months, have you felt or has someone told you that you have a problem with:  : None  Do you have a history of Chemical Use?: No  Do you have a history of Alcohol Use?: No  Do you have a history of Street Drug Abuse?: No  Histroy of Prescripton Drug Abuse?: No  Medical Problems:  Past Medical History:   Diagnosis Date    B[facility] ([facility]     Depressed        Risk:  Fall RiskTotal: 53  Qasim Scale Qasim Scale Score: 22  BVC Total: 0  Change in scores no Changes to plan of Care no    Status EXAM:   Status and Exam  Normal: No  Facial Expression: Expressionless, Avoids Gaze  Affect: Blunt  Level of Consciousness: Alert  Mood:Normal: No  Mood: Depressed, Anxious  Motor Activity:Normal: No  Motor Activity: Decreased  Interview Behavior: Cooperative, Evasive  Preception: [place] to Person, [place] to Time, [place] to Place, [place] to Situation  Attention:Normal: No  Attention: Distractible  Thought Processes: Blocking  Thought Content:Normal: No  Thought Content: Poverty of Content, Preoccupations  Hallucinations: None  Delusions: No  Delusions: Persecution  Memory:Normal: No  Memory: Poor Recent  Insight and Judgment: No  Insight and Judgment: Poor Judgment, Poor Insight, Unmotivated  Present Suicidal Ideation: No  Present Homicidal Ideation: No    Daily Assessment Last Entry:   Daily Sleep (WDL): Within Defined Limits         Patient Currently in Pain: Denies  Daily Nutrition (WDL): Within Defined Limits    Patient Monitoring:  Frequency of Checks: 4 times per hour, close    Psychiatric Symptoms:   Depression Symptoms  Depression Symptoms: Change in energy level, Loss of interest, Feelings of helplessness, Feelings of hopelessess, Isolative  Anxiety Symptoms  Anxiety Symptoms: Generalized  Sangeetha Symptoms  Sangeetha Symptoms: No problems reported or observed. Psychosis Symptoms  Delusion Type: No problems reported or observed. Suicide Risk CSSR-S:  1) Within the past month, have you wished you were dead or wished you could go to sleep and not wake up? : Yes(ARMINDA; PT would not disclose when asked, but pt stated yes in ED; pt did nod yes/agree to seek out healthcare staff if her stressors were to be to much/cannot contract for safety)  2) Have you actually had any thoughts of killing yourself? : (ARMINDA; PT would not disclose when asked, but pt stated yes in ED; pt did nod yes/agree to seek out healthcare staff if her stressors were to be to much/cannot contract for safety)  3) Have you been thinking about how you might kill yourself? : (ARMINDA; PT would not disclose when asked, but pt stated yes in ED; pt did nod yes/agree to seek out healthcare staff if her stressors were to be to much/cannot contract for safety)  5) Have you started to work out or worked out the details of how to kill yourself?  Do you intend to carry out this plan? : (ARMINDA; PT would not disclose when asked, but pt stated yes in ED; pt did nod yes/agree to seek out healthcare staff if her stressors were to be to much/cannot contract for safety)  6) Have you ever done anything, started to do anything, or prepared to do anything to end your life?: (ARMINDA; PT would not disclose when asked, but pt stated yes in ED; pt did nod yes/agree to seek out healthcare staff if her stressors were to be to much/cannot contract for safety)  Change in Result no Change in Plan of care no      EDUCATION:   EDUCATION:   Learner Progress Toward Treatment Goals: Reviewed results and recommendations of this team, Reviewed group plan and strategies, Reviewed signs, symptoms and risk of self harm and violent behavior, Reviewed goals and plan of care    Method:small group, individual verbal education    Outcome:verbalized by patient, but needs reinforcement to obtain goals    PATIENT GOALS:  Short term: To not listen to the voices and have a meeting with   Long term:  To follow up with [place]     PLAN/TREATMENT RECOMMENDATIONS UPDATE: continue with group therapies, increased socialization, continue planning for after discharge goals, continue with medication compliance    SHORT-TERM GOALS UPDATE:   Time frame for Short-Term Goals: 5-7 days    LONG-TERM GOALS UPDATE:   Time frame for Long-Term Goals: 6 months  Members Present in Team Meeting: See Signature Sheet    THOM Damian

## 2019-04-24 NOTE — PROGRESS NOTES
Psychiatric Progress Note Nurse Practitioner  Pertinent History & Psychiatric Examination    HPI: Godl De La Paz is a 51 y.o. female who was voluntarily admitted from the [place] [address] for suicidal and homicidal thoughts.      Today Delmar Livingston is interviewed in treatment team and again in the day room. She continues to pace in the milieu much less today. She does not socialize with others stating that she is unsure regarding the milieu. She alternates between periods of clarity and thought blocking. When asked questions that make her uncomfortable, she shuts down and her thought blocking increases. She is admitting to voices to [facility]" and she is able to rationalize that this would not take place. She states that she is trying hard to not listen to every voice in her head. She states that she has only missed one dose of medication but that when she does not take her medication, the voices come back. She is denying SI and HI admitting to depression and anxiety. Delmar Livingston continues to report racing thoughts and is an obsessive worrier. She is currently concerned regarding her relationship with God. She is not religiously preoccupied and she is not seen responding to internal stimuli. Chart and medications reviewed. Therapeutic support, empathetic care and psycho education provided greater than 20 minutes. At this time there is no safe alternative other than inpatient care.         Complaints of Pain: none  Functioning Relationships: good support system      Mental Status Evaluation:  Orientation: alertness: alert   Mood:. anxious, constricted and depressed      Affect:  blunted, constricted and flat      Appearance:  age appropriate   Activity:  Restless & fidgety   Gait/Posture: Normal   Speech:  soft   Thought Process:  circumstantial   Thought Content:  hallucinations   Sensorium:  person, place, time/date and situation   Cognition:  grossly intact   Memory: intact   Insight:  fair   Judgment: fair   Suicidal

## 2019-04-24 NOTE — PLAN OF CARE
Problem: Depressive Behavior With or Without Suicide Precautions:  Goal: Ability to disclose and discuss suicidal ideas will improve  Description  Ability to disclose and discuss suicidal ideas will improve  Outcome: Ongoing  Note:   Pt is accepting of 1:1 talk time with staff. Pt admits to depression and anxiety. Pt admits to voices but she cant make sense of them. Pt is thought blocking. Pt is isolative to self. Reassurance and support provided. Q15 min checks maintained. Pt remains safe at this time. Problem: Depressive Behavior With or Without Suicide Precautions:  Goal: Absence of self-harm  Description  Absence of self-harm  Outcome: Ongoing  Note:   No self harm behaviors  noted. Pt denies SI/HI and verbally agrees to approach staff if thoughts of self harm arises. Reassurance and support provided. Q15 min checks maintained. Pt remains safe at this time.

## 2019-04-24 NOTE — GROUP NOTE
Group Therapy Note    Date: April 24    Group Start Time: 8140  Group End Time: 1038  Group Topic: Community Meeting    GALO MERY OSBORNE    [place], [place]        Group Therapy Note    Attendees: 3         Patient's Goal:  Pt's goal was to get dressed, and take her medications     Notes:  Pt attended and participated in group     Status After Intervention:  Unchanged    Participation Level:  Active Listener and Interactive    Participation Quality: Appropriate, Attentive and Sharing      Speech:  normal      Thought Process/Content: Logical      Affective Functioning: Constricted/Restricted      Mood: anxious      Level of consciousness:  Alert and Attentive      Response to Learning: Progressing to goal      Endings: None Reported    Modes of Intervention: Education, Support, Socialization, Problem-solving and Reality-testing      Discipline Responsible: Psychoeducational Specialist      Signature:  Ron Pretty

## 2019-04-25 PROCEDURE — 90833 PSYTX W PT W E/M 30 MIN: CPT | Performed by: NURSE PRACTITIONER

## 2019-04-25 PROCEDURE — 99232 SBSQ HOSP IP/OBS MODERATE 35: CPT | Performed by: NURSE PRACTITIONER

## 2019-04-25 PROCEDURE — 6370000000 HC RX 637 (ALT 250 FOR IP): Performed by: NURSE PRACTITIONER

## 2019-04-25 PROCEDURE — 1240000000 HC EMOTIONAL WELLNESS R&B

## 2019-04-25 RX ADMIN — ARIPIPRAZOLE 15 MG: 15 TABLET ORAL at 08:04

## 2019-04-25 RX ADMIN — VORTIOXETINE 10 MG: 10 TABLET, FILM COATED ORAL at 08:04

## 2019-04-25 RX ADMIN — PYRIDOXINE HCL TAB 50 MG 50 MG: 50 TAB at 08:05

## 2019-04-25 RX ADMIN — ERGOCALCIFEROL 50000 UNITS: 1.25 CAPSULE ORAL at 08:04

## 2019-04-25 RX ADMIN — Medication 1 MG: at 21:53

## 2019-04-25 RX ADMIN — TRIFLUOPERAZINE HYDROCHLORIDE 6 MG: 1 TABLET, FILM COATED ORAL at 21:53

## 2019-04-25 NOTE — PROGRESS NOTES
Psychiatric Progress Note Nurse Practitioner  Pertinent History & Psychiatric Examination    HPI: Today Yariel Ochoa is interviewed in the day room. Initially, she refuses to speak with provider today but after encouragment agrees to cooperate. She shows minimal improvement today. She continues to pace in the milieu much less today. She does not socialize with others stating that she is unsure regarding the milieu. She alternates between lperiods of clarity with longer periods of thought blocking. When asked questions that make her uncomfortable, she shuts down and her thought blocking increases. She continues to admit to voices to [facility]" and she is able to rationalize that this would not take place. She is denying SI and HI admitting to depression and anxiety. Yariel Ochoa continues to report racing thoughts and is an obsessive worrier. She reports that her Mother, Father and  visited last night. She has trouble remembering details of the interaction. She reports better sleep and fair appetite. Chart and medications reviewed. Therapeutic support, empathetic care and psycho education provided greater than 20 minutes. At this time there is no safe alternative other than inpatient care.     Complaints of Pain: none  Functioning Relationships: good support system      Mental Status Evaluation:  Orientation: alertness: alert   Mood:. anxious, constricted and depressed      Affect:  blunted, constricted and flat      Appearance:  age appropriate   Activity:  Restless & fidgety   Gait/Posture: Normal   Speech:  soft   Thought Process:  circumstantial   Thought Content:  hallucinations   Sensorium:  person, place, time/date and situation   Cognition:  grossly intact   Memory: intact   Insight:  fair   Judgment: fair   Suicidal Ideations: denies suicidal ideation   Homicidal Ideations: Negative for homicidal ideation      Medication Side Effects: absent       Attention Span attention span appeared shorter than expected for age     Clinical Assessment Medical Decision    Axis I:   Schizophrenia    Precautions with Justification:   None    Medication Review/Mgmt: Medications reviewed with changes    Medical Issues: See Chart    Assessment of Risk for Harm to Self/Others:  None    PLAN:  · Continue inpatient psychiatric treatment  · Supportive therapy with medication management. Reviewed risks and benefits as well as potential side effects with patient. · Continue home medications. · Increase Abilify to 15mg daily beginning 4/25/19. · Review medications for efficacy and side effects. · Therapeutic support and empathetic care provided greater than 20 minutes. · Engage in therapeutic activities and groups. · Follow up at [facility] after symptoms stabilized.       Anticipated Discharge Date: TBD    Patient's Response to Treatment: Bridgette Mcnulty  4/25/2019  10:17 AM

## 2019-04-25 NOTE — PLAN OF CARE
Problem: Anger Management/Homicidal Ideation:  Goal: Ability to verbalize frustrations and anger appropriately will improve  Description  Ability to verbalize frustrations and anger appropriately will improve  Outcome: Ongoing  No angry outburst noted this shift  patient denies homicidal ideations  Problem: Anger Management/Homicidal Ideation:  Goal: Absence of homicidal ideation  Description  Absence of homicidal ideation  Outcome: Ongoing  Denies homicidal ideations 15 minute visual safety checks continue  Problem: Depressive Behavior With or Without Suicide Precautions:  Goal: Ability to disclose and discuss suicidal ideas will improve  Description  Ability to disclose and discuss suicidal ideas will improve  Outcome: Ongoing   [facility] than  on [facility]  denies suicidal ideations  15 minute checks continue  Problem: Depressive Behavior With or Without Suicide Precautions:  Goal: Absence of self-harm  Description  Absence of self-harm  Outcome: Ongoing   Remains free of harm this shift

## 2019-04-25 NOTE — PLAN OF CARE
Problem: Anger Management/Homicidal Ideation:  Goal: Ability to verbalize frustrations and anger appropriately will improve  Description  Ability to verbalize frustrations and anger appropriately will improve  Outcome: Ongoing  Note:   Patient will improve ability to verbalize frustrations and anger appropriately     Problem: Anger Management/Homicidal Ideation:  Goal: Absence of homicidal ideation  Description  Absence of homicidal ideation  Outcome: Ongoing  Note:   Patient will be absent of homicidal ideation      Problem: Depressive Behavior With or Without Suicide Precautions:  Goal: Ability to disclose and discuss suicidal ideas will improve  Description  Ability to disclose and discuss suicidal ideas will improve  4/24/2019 2227 by Valorie Harrison  Outcome: Ongoing  Note:   Patient will improve ability to disclose and discuss suicidal ideas     Problem: Depressive Behavior With or Without Suicide Precautions:  Goal: Absence of self-harm  Description  Absence of self-harm  4/24/2019 2227 by Valorie Harrison  Outcome: Ongoing  Note:   Patient will be absent of self-harm

## 2019-04-25 NOTE — GROUP NOTE
Group Therapy Note    Date: April 25    Group Start Time: 1000  Group End Time: 5027  Group Topic: Psychotherapy    [facility]    THOM Jernigan        Group Therapy Note    Attendees: 6/16         Patient's Goal:  To discuss emotions and support system. Notes:  Pt participated in group    Status After Intervention:  Unchanged    Participation Level:  Active Listener    Participation Quality: Appropriate, Attentive and Sharing      Speech:  normal      Thought Process/Content: Logical      Affective Functioning: Congruent      Mood: anxious      Level of consciousness:  Alert, Oriented x4 and Attentive      Response to Learning: Able to verbalize current knowledge/experience, Able to verbalize/acknowledge new learning, Able to retain information and Progressing to goal      Endings: None Reported    Modes of Intervention: Education, Support and Socialization      Discipline Responsible: /Counselor      Signature:  THOM Jernigan

## 2019-04-25 NOTE — PLAN OF CARE
Patient maintains good appetite and adl's  is encouraged to attend group programming to learn new coping skills  safety maintained with 15 minute visual saftety checks patient takes medications as prescribed  is appropritate with staff and peers

## 2019-04-25 NOTE — GROUP NOTE
Group Therapy Note    Date: April 25    Group Start Time: 1430  Group End Time: 1510  Group Topic: Psychoeducation    GALO Tucker Pap, CTRS        Group Therapy Note  Pt did not attend Therapeutic Recreation at 1430 d/t resting in room despite staff invitation to attend.

## 2019-04-25 NOTE — GROUP NOTE
Group Therapy Note    Date: April 25    Group Start Time: 1100  Group End Time: 1544  Group Topic: Psychoeducation    [facility] [place], [place]        Group Therapy Note    Attendees: 5         Patient's Goal:  To demonstrate interpersonal interaction     Notes:  Pt attended and participated in group for 20 minutes. Pt asked to leave group early to prepare for visitation. Status After Intervention:  Unchanged    Participation Level:  Active Listener and Interactive    Participation Quality: Appropriate and Attentive      Speech:  normal      Thought Process/Content: Logical      Affective Functioning: Congruent      Mood: euthymic      Level of consciousness:  Alert and Attentive      Response to Learning: Progressing to goal      Endings: None Reported    Modes of Intervention: Socialization, Activity, Media and Reality-testing      Discipline Responsible: Psychoeducational Specialist      Signature:  Ezio Ferrari

## 2019-04-26 PROCEDURE — 6370000000 HC RX 637 (ALT 250 FOR IP): Performed by: NURSE PRACTITIONER

## 2019-04-26 PROCEDURE — 1240000000 HC EMOTIONAL WELLNESS R&B

## 2019-04-26 PROCEDURE — 99232 SBSQ HOSP IP/OBS MODERATE 35: CPT | Performed by: NURSE PRACTITIONER

## 2019-04-26 RX ADMIN — VORTIOXETINE 10 MG: 10 TABLET, FILM COATED ORAL at 08:47

## 2019-04-26 RX ADMIN — ARIPIPRAZOLE 15 MG: 15 TABLET ORAL at 08:47

## 2019-04-26 RX ADMIN — TRIFLUOPERAZINE HYDROCHLORIDE 6 MG: 1 TABLET, FILM COATED ORAL at 21:26

## 2019-04-26 RX ADMIN — Medication 1 MG: at 21:26

## 2019-04-26 RX ADMIN — PYRIDOXINE HCL TAB 50 MG 50 MG: 50 TAB at 14:52

## 2019-04-26 ASSESSMENT — PAIN SCALES - GENERAL: PAINLEVEL_OUTOF10: 0

## 2019-04-26 NOTE — GROUP NOTE
Group Therapy Note    Date: April 26    Group Start Time: 0900  Group End Time: 0920  Group Topic: [address] [place], [place]        Group Therapy Note    Attendees: 9/16         Patient's Goal:  Patient will demonstrate increased interpersonal interaction     Notes:  Patient attended group and participated. Status After Intervention:  Improved    Participation Level:  Active Listener and Interactive    Participation Quality: Appropriate, Attentive and Sharing      Speech:  normal      Thought Process/Content: Logical      Affective Functioning: Congruent      Mood: euthymic      Level of consciousness:  Alert and Oriented x4      Response to Learning: Progressing to goal      Endings: None Reported    Modes of Intervention: Education, Socialization, Clarifying, Problem-solving and Reality-testing      Discipline Responsible: Psychoeducational Specialist      Signature:  Frederick Duke

## 2019-04-26 NOTE — GROUP NOTE
Group Therapy Note    Date: April 26    Group Start Time: 1340  Group End Time: 1440  Group Topic: Psychoeducation    [id number] [facility], [address]        Group Therapy Note    Attendees: 9/14         Patient's Goal:   Patient will demonstrate increased interpersonal interaction    Notes:  Patient attended group and participated     Status After Intervention:  Improved    Participation Level:  Active Listener and Interactive    Participation Quality: Appropriate, Attentive and Sharing      Speech:  normal      Thought Process/Content: Logical      Affective Functioning: Congruent      Mood: euthymic      Level of consciousness:  Alert and Oriented x4      Response to Learning: Progressing to goal      Endings: None Reported    Modes of Intervention: Education, Socialization, Problem-solving, Activity and Reality-testing      Discipline Responsible: Psychoeducational Specialist      Signature:  Brandy Kincaid

## 2019-04-26 NOTE — PLAN OF CARE
Problem: Anger Management/Homicidal Ideation:  Goal: Ability to verbalize frustrations and anger appropriately will improve  Description  Ability to verbalize frustrations and anger appropriately will improve  4/25/2019 2250 by Jessica James RN  Note:   PT was accepting of 1:1 meet with RN. PT was accepting of PM program.  PT has been absent of any angry outbursts, and denied any current issues when asked. Pt Agreed to seek out health care staff if stressors were to become to be to much. Safety checks have been maintained every 15 minutes and at irregular intervals throughout the shift      Problem: Depressive Behavior With or Without Suicide Precautions:  Goal: Absence of self-harm  Description  Absence of self-harm  4/25/2019 2250 by Jessica James RN  Note:   PT was accepting of 1:1 meet with RN. PT remains free from any self harm, falls, or any other type of injury as of this time. PT denied any current issues when asked. PT was out on unit, but aloof to peers, sitting by herself for most of PM shift. PT was displaying increased thought blocking, unable to provide answers to some questions. PT is wearing non-skid stockings at the time of assessment. PT verbalizes understanding of individual fall risks and ways to prevent them. PT agreed to use the call light if needed. PT agreed to seek out health care staff if stressors or other issues were to become to be too much. Safety checks have been maintained every 15 minutes and at irregular intervals throughout this shift.

## 2019-04-26 NOTE — GROUP NOTE
Group Therapy Note    Date: April 26    Group Start Time: 1100  Group End Time: 1140  Group Topic: Cognitive Skills    GALO Shepherd, CTRS    Group Therapy Note    Pt did not attend Therapeutic Recreation d/t resting in room despite staff invitation to attend.

## 2019-04-26 NOTE — PLAN OF CARE
Problem: Anger Management/Homicidal Ideation:  Goal: Absence of homicidal ideation  Description  Absence of homicidal ideation  Outcome: Met This Shift  Note:   Denies homicidal ideations     Problem: Depressive Behavior With or Without Suicide Precautions:  Goal: Absence of self-harm  Description  Absence of self-harm  Outcome: Met This Shift  Note:   No self harm noted. Problem: Anger Management/Homicidal Ideation:  Goal: Ability to verbalize frustrations and anger appropriately will improve  Description  Ability to verbalize frustrations and anger appropriately will improve  Outcome: Ongoing  Note:   Patient is controlled and cooperative. Denies suicidal or homicidal ideations. Admits to voices. Patient has a very delayed response. Attends groups. Problem: Depressive Behavior With or Without Suicide Precautions:  Goal: Ability to disclose and discuss suicidal ideas will improve  Description  Ability to disclose and discuss suicidal ideas will improve  Outcome: Ongoing  Note:   Patient denies suicidal ideations at this time.

## 2019-04-26 NOTE — CARE COORDINATION
Pt, writer and pt's , Michael Mills, met to discharge discharge planning. Pt's  is concerned that pt's command hallucinations are getting much worse and he has had to have pt's parents in to \"babysit\" her while he is at work. Pt's  states he cannot have them taking care of pt as they are 80 and have health issues, themselves. Pt's  is requesting pt come up with an alternate friend/family member she feels comfortable with to be with her in their home, to discuss home health care or long term ECF placement. Pt's  would like SW to meet with pt and her mother, tomorrow, 4/27/19, at lunch time visiting hours to discuss those three options. Pt is agreeable to discuss.

## 2019-04-26 NOTE — PROGRESS NOTES
appeared shorter than expected for age     Clinical Assessment Medical Decision    Axis I:   Schizophrenia    Precautions with Justification:   None    Medication Review/Mgmt: Medications reviewed with changes    Medical Issues: See Chart    Assessment of Risk for Harm to Self/Others:  None    PLAN:  · Continue inpatient psychiatric treatment  · Supportive therapy with medication management. Reviewed risks and benefits as well as potential side effects with patient. · Continue home medications. · Increase Abilify to 15mg daily beginning 4/25/19. · Review medications for efficacy and side effects. · Therapeutic support and empathetic care provided. · Engage in therapeutic activities and groups. · Follow up at [facility] after symptoms stabilized.       Anticipated Discharge Date: TBD    Patient's Response to Treatment: Slow    Anaya Opal  4/26/2019  11:04 AM

## 2019-04-26 NOTE — GROUP NOTE
Group Therapy Note    Date: April 26    Group Start Time: 1000  Group End Time: 4165  Group Topic: Psychotherapy    GALO ORDONEZ Jyoti Kimber    THOM Stevens                                                                        Group Therapy Note    Date: April 26    Group Start Time: 1000  Group End Time: 1045  Group Topic: Psychotherapy    GALO Quiroga    THOM Stevens        Group Therapy Note    Attendees: 9/16      Patient's Goal:  To discuss emotions and support system    Notes:  Pt participated    Status After Intervention:  Unchanged    Participation Level:  Active Listener    Participation Quality: Appropriate, Attentive and Sharing      Speech:  normal      Thought Process/Content: Logical      Affective Functioning: Congruent      Mood: anxious      Level of consciousness:  Alert, Oriented x4 and Attentive      Response to Learning: Able to verbalize current knowledge/experience, Able to verbalize/acknowledge new learning, Able to retain information and Progressing to goal      Endings: None Reported    Modes of Intervention: Education, Support and Socialization      Discipline Responsible: /Counselor      Signature:  THOM Stevens

## 2019-04-26 NOTE — CARE COORDINATION
Writer met with pt regarding coping skills. Pt stated she would like to do something to decrease her anxiety and writer educated pt on keeping her mind occupied. Writer printed out some crossword puzzles for pt to complete. Writer phoned pt's LETHA Cheek on file, [phone], and set up family meeting for this date at 0.

## 2019-04-27 PROCEDURE — 6370000000 HC RX 637 (ALT 250 FOR IP): Performed by: NURSE PRACTITIONER

## 2019-04-27 PROCEDURE — 1240000000 HC EMOTIONAL WELLNESS R&B

## 2019-04-27 RX ADMIN — Medication 1 MG: at 21:06

## 2019-04-27 RX ADMIN — ARIPIPRAZOLE 15 MG: 15 TABLET ORAL at 08:08

## 2019-04-27 RX ADMIN — TRIFLUOPERAZINE HYDROCHLORIDE 6 MG: 1 TABLET, FILM COATED ORAL at 21:06

## 2019-04-27 RX ADMIN — PYRIDOXINE HCL TAB 50 MG 50 MG: 50 TAB at 08:08

## 2019-04-27 RX ADMIN — MAGNESIUM HYDROXIDE 30 ML: 400 SUSPENSION ORAL at 09:02

## 2019-04-27 RX ADMIN — VORTIOXETINE 10 MG: 10 TABLET, FILM COATED ORAL at 08:08

## 2019-04-27 ASSESSMENT — PAIN SCALES - GENERAL: PAINLEVEL_OUTOF10: 0

## 2019-04-27 NOTE — BH NOTE
Patient did not participate in  [facility], [facility] group,  After invitation given. Patient remain seclusive to self. Every 15 minute checks maintained.

## 2019-04-27 NOTE — GROUP NOTE
Group Therapy Note    Date: April 27    Group Start Time: 1330  Group End Time: 4349  Group Topic: Recreational    [facility]    BRENNEN Bosch LSW        Group Therapy Note    Attendees: 10         Patient's Goal:  Pt will demonstrate increased interpersonal interaction. Notes:  Pt is making progress as evidenced by participating in group activity and engaging with others in a supportive environment. Status After Intervention:  Improved    Participation Level:  Active Listener and Interactive    Participation Quality: Appropriate, Attentive, Sharing and Supportive      Speech:  normal      Thought Process/Content: Logical      Affective Functioning: Congruent      Mood: stable      Level of consciousness:  Alert, Oriented x4 and Attentive      Response to Learning: Able to verbalize current knowledge/experience, Able to verbalize/acknowledge new learning, Able to retain information, Capable of insight, Able to change behavior and Progressing to goal      Endings: None Reported    Modes of Intervention: Support, Socialization and Activity      Discipline Responsible: /Counselor      Signature:  BRENNEN Bosch LSW

## 2019-04-27 NOTE — PLAN OF CARE
Problem: Anger Management/Homicidal Ideation:  Goal: Ability to verbalize frustrations and anger appropriately will improve  Description  Ability to verbalize frustrations and anger appropriately will improve  4/26/2019 2140 by Joseph Harley LPN  Outcome: Ongoing     Problem: Anger Management/Homicidal Ideation:  Goal: Absence of homicidal ideation  Description  Absence of homicidal ideation  4/26/2019 2140 by Joseph Harley LPN  Outcome: Ongoing     Problem: Depressive Behavior With or Without Suicide Precautions:  Goal: Ability to disclose and discuss suicidal ideas will improve  Description  Ability to disclose and discuss suicidal ideas will improve  4/26/2019 2140 by Joseph Harley LPN  Outcome: Ongoing   Pt admits to hearing voices, but does not discuss content. Thoughts are loose and disorganized and pt has difficulty tracking conversations and staying on topic during discussion. Will cont to reorient to reality and provide support and reassurance as needed. Pt denies SI/HI at this time. Encouraged to express feelings during 1:1 and as needed. Pt is med compliant. Pt attending groups offered on unit, and participated appropriately. Pt eating appropriately. Pt sleeping appropriately. Appropriate ADLs. q15 minute safety checks maintained.

## 2019-04-27 NOTE — GROUP NOTE
Group Therapy Note    Date: April 27    Group Start Time: 1000  Group End Time: 1961  Group Topic: Psychoeducation    [facility]    BRENNEN Vera, PIPPAW        Group Therapy Note    Attendees: 9         Patient's Goal:  Increase socialization and understanding of own feelings and emotions. Notes:  Pt is making progress as evidenced by participating in group discussion about attitudes and positivity. Pt shared helping others brings her jono and contributes to her positive outlook on life. Pt was an active listener and support others. Status After Intervention:  Improved    Participation Level:  Active Listener and Interactive    Participation Quality: Appropriate, Attentive, Sharing and Supportive      Speech:  normal      Thought Process/Content: Logical      Affective Functioning: Congruent      Mood: stable      Level of consciousness:  Alert, Oriented x4 and Attentive      Response to Learning: Able to verbalize current knowledge/experience, Able to verbalize/acknowledge new learning, Able to retain information, Capable of insight, Able to change behavior and Progressing to goal      Endings: None Reported    Modes of Intervention: Education, Support, Socialization, Problem-solving and Activity      Discipline Responsible: /Counselor      Signature:  BRENNEN Vera, THOM

## 2019-04-27 NOTE — GROUP NOTE
Group Therapy Note    Date: April 27    Group Start Time: 1600  Group End Time: 1630  Group Topic: Healthy Living/Wellness    [facility]    Gold Ocasio LPN        Group Therapy Note    Attendees: 7         Patient's Goal:  forgiveness    Notes:  none    Status After Intervention:  Improved    Participation Level:  Active Listener    Participation Quality: Appropriate      Speech:  normal      Thought Process/Content: Logical      Affective Functioning: Congruent and Flat      Mood: normal      Level of consciousness:  Alert      Response to Learning: Able to verbalize current knowledge/experience      Endings: none reported    Modes of Intervention: Education      Discipline Responsible: Licensed Practical Nurse      Signature:  Gold Ocasio LPN

## 2019-04-27 NOTE — PLAN OF CARE
Problem: Depressive Behavior With or Without Suicide Precautions:  Goal: Ability to disclose and discuss suicidal ideas will improve  Description  Ability to disclose and discuss suicidal ideas will improve  4/27/2019 0935 by Preethi Soliz RN  Outcome: Ongoing    Patient reports thoughts to harm self but will not share plan. Patient reports she is not sure if she could do plan in here or not. Patient contracts for safety while on unit. Patient agrees to approach staff if she starts to feel like she will act upon her thoughts to harm self. Patient has not done anything to harm self currently or shown any signs of actively harming self. Patient encouraged to attend groups to learn coping skills. Patient voiced understanding.

## 2019-04-27 NOTE — GROUP NOTE
Group Therapy Note    Date: April 27    Group Start Time: 1100  Group End Time: 1125  Group Topic: Group Therapy    [facility]    Anselmo Habermann, RN        Group Therapy Note    Attendees: 11         Patient's Goal:  To be ready for discharge    Notes:  Patient was an active listener    Status After Intervention:  Unchanged    Participation Level:  Active Listener, Interactive and Minimal    Participation Quality: Appropriate, Attentive and Supportive      Speech:  normal      Thought Process/Content: Logical      Affective Functioning: Flat      Mood: anxious and depressed      Level of consciousness:  Alert, Oriented x4 and Attentive      Response to Learning: Able to verbalize current knowledge/experience, Able to verbalize/acknowledge new learning, Able to retain information, Capable of insight, Able to change behavior and Progressing to goal      Endings: Self-harm    Modes of Intervention: Education, Support, Exploration, Clarifying, Problem-solving and Limit-setting      Discipline Responsible: Registered Nurse      Signature:  Anselmo Habermann, RN

## 2019-04-27 NOTE — GROUP NOTE
Group Therapy Note    Date: April 27    Group Start Time: 1430  Group End Time: 1500  Group Topic: Recreational    [facility]    Bean Bejarano RN        Group Therapy Note    Attendees: 11      Patient attended open recreational group by listening to music in the dayroom.      Signature:  Bean Bejarano RN

## 2019-04-27 NOTE — PLAN OF CARE
Problem: Anger Management/Homicidal Ideation:  Goal: Absence of homicidal ideation  Description  Absence of homicidal ideation  4/27/2019 0930 by Cathy Castillo RN  Outcome: Ongoing     Patient denies any thoughts to harm anyone else currently. Patient has shown no signs of aggression towards other patients or staff. Patient has not voiced any threats to harm others. Patient is isolative and withdrawn. Patient is not interactive with other patients. Patient encouraged to attend groups to learn coping skills.

## 2019-04-27 NOTE — GROUP NOTE
Group Therapy Note    Date: April 27    Group Start Time: 0905  Group End Time: 0920  Group Topic: Community Meeting    GALO Beck, [address]        Patient's Short Term Goal for Today:  Change clothes. Status After Intervention:  Improved    Participation Level:  Active Listener and Interactive    Participation Quality: Appropriate, Attentive and Sharing      Speech:  normal      Thought Process/Content: Logical      Affective Functioning: Congruent      Level of consciousness:  Alert and Attentive      Response to Learning: Able to verbalize current knowledge/experience, Able to verbalize/acknowledge new learning, Able to retain information, Capable of insight and Progressing to goal      Endings: None Reported    Modes of Intervention: Education, Support, Socialization, Exploration, Clarifying, Problem-solving, Confrontation, Limit-setting and Reality-testing      Discipline Responsible: Psychoeducational Specialist      Signature:  Elmira Duran

## 2019-04-28 PROCEDURE — 6370000000 HC RX 637 (ALT 250 FOR IP): Performed by: NURSE PRACTITIONER

## 2019-04-28 PROCEDURE — 1240000000 HC EMOTIONAL WELLNESS R&B

## 2019-04-28 PROCEDURE — 99232 SBSQ HOSP IP/OBS MODERATE 35: CPT | Performed by: NURSE PRACTITIONER

## 2019-04-28 RX ADMIN — Medication 3 MG: at 21:59

## 2019-04-28 RX ADMIN — ARIPIPRAZOLE 15 MG: 15 TABLET ORAL at 08:08

## 2019-04-28 RX ADMIN — TRIFLUOPERAZINE HYDROCHLORIDE 6 MG: 1 TABLET, FILM COATED ORAL at 21:58

## 2019-04-28 RX ADMIN — VORTIOXETINE 10 MG: 10 TABLET, FILM COATED ORAL at 08:08

## 2019-04-28 RX ADMIN — PYRIDOXINE HCL TAB 50 MG 50 MG: 50 TAB at 08:08

## 2019-04-28 RX ADMIN — TRAZODONE HYDROCHLORIDE 50 MG: 50 TABLET ORAL at 21:59

## 2019-04-28 ASSESSMENT — PAIN SCALES - GENERAL: PAINLEVEL_OUTOF10: 0

## 2019-04-28 NOTE — GROUP NOTE
Group Therapy Note    Date: April 28    Group Start Time: 1330  Group End Time: 1415  Group Topic: Recreational    [facility]    Codementor, CTRS    Pt did not participate in Recreational Therapy group at 1330 despite staff encouragement.

## 2019-04-28 NOTE — PROGRESS NOTES
Psychiatric Progress Note Nurse Practitioner  Pertinent History & Psychiatric Examination    HPI: Gloria Blackburn was interviewed in the day room today. She is casually dressed with good hygiene. She reports that her thoughts are clearer and she is feeling better. She is still not sleeping well though and will try taking Trazodone tonight. She continues to experience some auditory hallucinations but they are less intrusive. She denies depression and anxiety. She denies any suicidal or homicidal thoughts. She is attending groups. She is eating meals. She did have visitors today and reports that her  and mother are supportive of her. Chart and medications reviewed. Therapeutic support, empathetic care and psycho education provided. At this time there is no safe alternative other than inpatient care. Complaints of Pain: none  Functioning Relationships: good support system      Mental Status Evaluation:  Orientation: alertness: alert   Mood:. anxious, constricted and depressed      Affect:  blunted, constricted and flat      Appearance:  age appropriate   Activity:  Restless & fidgety   Gait/Posture: Normal   Speech:  soft   Thought Process:  circumstantial   Thought Content:  hallucinations   Sensorium:  person, place, time/date and situation   Cognition:  grossly intact   Memory: intact   Insight:  fair   Judgment: fair   Suicidal Ideations: denies suicidal ideation   Homicidal Ideations: Negative for homicidal ideation      Medication Side Effects: absent       Attention Span attention span appeared shorter than expected for age     Clinical Assessment Medical Decision    Axis I:   Schizophrenia    Precautions with Justification:   None    Medication Review/Mgmt: Medications reviewed with changes    Medical Issues: See Chart    Assessment of Risk for Harm to Self/Others:  None    PLAN:  · Continue inpatient psychiatric treatment  · Supportive therapy with medication management.  Reviewed risks and benefits as well as potential side effects with patient. · Continue home medications. · Abilify was increased to 15 mg on 4/25/19. · Increase Melatonin to 3 mg QHS on 4/28/19  · Review medications for efficacy and side effects. · Therapeutic support and empathetic care provided. · Engage in therapeutic activities and groups. · Follow up at [facility] after symptoms stabilized.       Anticipated Discharge Date: TBD    Patient's Response to Treatment: Positive    Jesse Owusu  4/28/2019  12:40 PM

## 2019-04-28 NOTE — GROUP NOTE
Group Therapy Note    Date: April 28    Group Start Time: 1000  Group End Time: 0406  Group Topic: Group Therapy    [facility]    BRENNEN Kiser, THOM        Group Therapy Note    Attendees: 12         Patient's Goal:  Patient will demonstrate increased interpersonal interaction     Notes:  Pt was active in group discussion    Status After Intervention:  Unchanged    Participation Level:  Active Listener and Interactive    Participation Quality: Appropriate, Sharing and Supportive      Speech:  normal      Thought Process/Content: Logical      Affective Functioning: Congruent      Mood: depressed      Level of consciousness:  Alert, Oriented x4 and Attentive      Response to Learning: Able to verbalize current knowledge/experience, Able to change behavior and Progressing to goal      Endings: None Reported    Modes of Intervention: Support, Socialization, Clarifying and Problem-solving      Discipline Responsible: /Counselor      Signature:  BRENNEN Kiser LSW

## 2019-04-28 NOTE — PLAN OF CARE
Problem: Anger Management/Homicidal Ideation:  Goal: Absence of homicidal ideation  Description  Absence of homicidal ideation  Note:   PT was accepting of 1:1 meet with RN. PT denied any current thoughts of homicidal ideations as of this time. PT agreed to seek out health care staff if stressors were to become to be too much. Safety checks have been maintained every 15 minutes and at irregular intervals throughout the shift. Problem: Depressive Behavior With or Without Suicide Precautions:  Goal: Ability to disclose and discuss suicidal ideas will improve  Description  Ability to disclose and discuss suicidal ideas will improve  Note:   PT was accepting of 1:1 meet with RN. PT admitted/shook her head yes to be having current thoughts of suicidal ideations but when asked about plan pt got quiet and would not disclose. Pt did contract for safety and agreed to seek out health care staff if stressors were to become to be too much. Safety checks have been maintained every 15 minutes and at irregular intervals throughout the shift.

## 2019-04-28 NOTE — GROUP NOTE
Group Therapy Note    Date: April 28    Group Start Time: 0900  Group End Time: 0920  Group Topic: Community Meeting    BARNEY Milan     Patient's Short Term Goal for Today:  Talk more with others on the unit. Status After Intervention:  Improved    Participation Level:  Active Listener and Interactive    Participation Quality: Appropriate, Attentive and Sharing      Speech:  normal      Thought Process/Content: Logical      Affective Functioning: Congruent      Level of consciousness:  Alert and Attentive      Response to Learning: Able to verbalize current knowledge/experience, Able to verbalize/acknowledge new learning, Able to retain information, Capable of insight and Progressing to goal      Endings: None Reported    Modes of Intervention: Education, Support, Socialization, Exploration, Clarifying, Problem-solving, Confrontation, Limit-setting and Reality-testing      Discipline Responsible: Psychoeducational Specialist      Signature:  Lina Quiros

## 2019-04-29 PROCEDURE — 99232 SBSQ HOSP IP/OBS MODERATE 35: CPT | Performed by: NURSE PRACTITIONER

## 2019-04-29 PROCEDURE — 6370000000 HC RX 637 (ALT 250 FOR IP): Performed by: NURSE PRACTITIONER

## 2019-04-29 PROCEDURE — 1240000000 HC EMOTIONAL WELLNESS R&B

## 2019-04-29 RX ADMIN — ARIPIPRAZOLE 15 MG: 15 TABLET ORAL at 08:29

## 2019-04-29 RX ADMIN — Medication 3 MG: at 20:59

## 2019-04-29 RX ADMIN — ERGOCALCIFEROL 50000 UNITS: 1.25 CAPSULE ORAL at 08:29

## 2019-04-29 RX ADMIN — VORTIOXETINE 10 MG: 10 TABLET, FILM COATED ORAL at 08:29

## 2019-04-29 RX ADMIN — TRIFLUOPERAZINE HYDROCHLORIDE 6 MG: 1 TABLET, FILM COATED ORAL at 20:59

## 2019-04-29 RX ADMIN — PYRIDOXINE HCL TAB 50 MG 50 MG: 50 TAB at 08:29

## 2019-04-29 ASSESSMENT — PAIN SCALES - GENERAL: PAINLEVEL_OUTOF10: 0

## 2019-04-29 NOTE — GROUP NOTE
Group Therapy Note    Date: April 29    Group Start Time: 1000  Group End Time: 3912  Group Topic: Psychotherapy    [facility] Rheta Thad, LSW        Group Therapy Note    Attendees: 13/18                                                                          Group Therapy Note    Date: April 29    Group Start Time: 1000  Group End Time: 9803  Group Topic: Psychotherapy    GALO ROSAI Rheta Thad, LSW        Group Therapy Note    Attendees: 13/18      Patient's Goal:  To discuss emotions and support system    Notes:  Pt participated    Status After Intervention:  Unchanged    Participation Level:  Active Listener    Participation Quality: Appropriate, Attentive and Sharing      Speech:  normal      Thought Process/Content: Logical      Affective Functioning: Congruent      Mood: anxious      Level of consciousness:  Alert, Oriented x4 and Attentive      Response to Learning: Able to verbalize current knowledge/experience, Able to verbalize/acknowledge new learning, Able to retain information and Progressing to goal      Endings: None Reported    Modes of Intervention: Education, Support and Socialization      Discipline Responsible: /Counselor      Signature:  THOM Zhou

## 2019-04-29 NOTE — PROGRESS NOTES
Psychiatric Progress Note Nurse Practitioner  Pertinent History & Psychiatric Examination    HPI: Michael Rodas is seen today in the day area. She is guarded and preoccupied at times. States that she continues to hear voices but will not elaborate on their content. She appears fearful of them and is hesitant to talk about them. She is observed pacing on the unit at times and seems to exhibit thought blocking. She has been attending groups and is more social in the day area. Chart and medications reviewed. Therapeutic support, empathetic care and psycho education provided. At this time there is no safe alternative other than inpatient care. Patient feels that she would not be safe to go home. Complaints of Pain: none  Functioning Relationships: good support system      Mental Status Evaluation:  Orientation: alertness: alert   Mood:. anxious, constricted and depressed      Affect:  blunted, constricted and flat      Appearance:  age appropriate   Activity:  Restless & fidgety   Gait/Posture: Normal   Speech:  soft   Thought Process:  circumstantial   Thought Content:  hallucinations   Sensorium:  person, place, time/date and situation   Cognition:  grossly intact   Memory: intact   Insight:  fair   Judgment: fair   Suicidal Ideations: denies suicidal ideation   Homicidal Ideations: Negative for homicidal ideation      Medication Side Effects: absent       Attention Span attention span appeared shorter than expected for age     Clinical Assessment Medical Decision    Axis I:   Schizophrenia    Precautions with Justification:   None    Medication Review/Mgmt: Medications reviewed with no changes    Medical Issues: See Chart    Assessment of Risk for Harm to Self/Others:  None    PLAN:  · Continue inpatient psychiatric treatment  · Supportive therapy with medication management. Reviewed risks and benefits as well as potential side effects with patient. · Continue home medications.   · Abilify was increased to 15 mg on 4/25/19. · Increase Melatonin to 3 mg QHS on 4/28/19  · Review medications for efficacy and side effects. · Therapeutic support and empathetic care provided. · Engage in therapeutic activities and groups. · Follow up at [facility] after symptoms stabilized.       Anticipated Discharge Date: TBD    Patient's Response to Treatment: Positive    Kel Shea Genevievemorena Murrenetta  4/29/2019  4:56 PM

## 2019-04-29 NOTE — GROUP NOTE
Group Therapy Note    Date: April 29    Group Start Time: 1430  Group End Time: 7377  Group Topic: Psychoeducation    GALO BHHUA Hernandez     Patient's Goal: Pt will demonstrate increased interpersonal interaction      Notes:      Status After Intervention:  Unchanged    Participation Level: None    Participation Quality: Appropriate      Speech:  Pt did not participate       Thought Process/Content: Pt did not participate       Affective Functioning: Blunted and Constricted/Restricted      Mood: dysphoric      Level of consciousness:  Alert      Response to Learning: Progressing to goal      Endings: None Reported    Modes of Intervention: Education, Support, Socialization, Exploration, Clarifying and Problem-solving      Discipline Responsible: Psychoeducational Specialist      Signature:  Sabra Hernandez

## 2019-04-29 NOTE — GROUP NOTE
Group Therapy Note    Date: April 29    Group Start Time: 1100  Group End Time: 1130  Group Topic: Psychoeducation    [facility]    Lit Alfaro        Group Therapy Note    Attendees: 13         Patient's Goal:  To demonstrate interpersonal interaction     Notes:  PT attended and participated     Status After Intervention:  Improved    Participation Level:  Active Listener and Interactive    Participation Quality: Appropriate and Attentive      Speech:  normal      Thought Process/Content: Logical      Affective Functioning: Congruent      Mood: euthymic      Level of consciousness:  Alert and Attentive      Response to Learning: Progressing to goal      Endings: None Reported    Modes of Intervention: Education, Exploration, Problem-solving, Media and Reality-testing      Discipline Responsible: Psychoeducational Specialist      Signature:  Naya Kinney

## 2019-04-29 NOTE — GROUP NOTE
Group Therapy Note    Date: April 29    Group Start Time: 1600  Group End Time: 1630  Group Topic: Group Therapy    [facility]    Karin Hawk        Group Therapy Note    Attendees: 13/18       Patient's Goal:  Pt will demonstrate increased positive thinking    Notes:     Status After Intervention:  Unchanged    Participation Level:  Active Listener    Participation Quality: Appropriate      Speech:  normal      Thought Process/Content: Logical      Affective Functioning: Congruent      Mood: euthymic      Level of consciousness:  Alert and Oriented x4      Response to Learning: Able to verbalize current knowledge/experience and Able to verbalize/acknowledge new learning      Endings: None Reported    Modes of Intervention: Education, Support and Socialization      Discipline Responsible: Behavorial Health Tech      Signature:  Bailee Ty

## 2019-04-29 NOTE — GROUP NOTE
Group Therapy Note    Date: April 28    Group Start Time: 2015  Group End Time: 2100  Group Topic: Wrap-Up/Relaxation    [facility] Sagar [place] RN        Group Therapy Note    Attendees: 15/18       Patient's Goal:    1. To be able to reflect on daily unit activities/experiences. 2.  To review accomplished daily goals and encourage patient to set a new goal for the next day. 3.  To demonstrate increased interpersonal interaction      Notes:  Pt attended and actively participated in Wrap-Up/Relaxation groups this evening. Status After Intervention:  Improved     Participation Level:  Active Listener and Interactive     Participation Quality: Appropriate, Attentive and Sharing     Speech:  normal     Thought Process/Content: Logical     Affective Functioning: Congruent     Mood: euthymic     Level of consciousness:  Alert, Oriented x4 and Attentive     Response to Learning: Able to verbalize current knowledge/experience, Able to verbalize/acknowledge new learning, Able to retain information and Capable of insight     Endings: None Reported     Modes of Intervention: Education, Support and Socialization      Discipline Responsible: Registered Nurse        Signature:  Wagner Cardenas RN

## 2019-04-29 NOTE — GROUP NOTE
Group Therapy Note    Date: April 29    Group Start Time: 0900  Group End Time: 0920  Group Topic: Community Meeting    GALO Armijo       Patient's Goal: Pt will demonstrate increased interpersonal interaction      Notes:      Status After Intervention:  Unchanged    Participation Level:  Active Listener and Interactive    Participation Quality: Appropriate, Attentive and Sharing      Speech:  normal      Thought Process/Content: Logical      Affective Functioning: Constricted/Restricted      Mood: euthymic      Level of consciousness:  Alert, Oriented x4 and Attentive      Response to Learning: Able to verbalize current knowledge/experience, Able to verbalize/acknowledge new learning, Able to retain information and Progressing to goal      Endings: None Reported    Modes of Intervention: Education, Support, Socialization, Exploration, Clarifying and Problem-solving      Discipline Responsible: Psychoeducational Specialist      Signature:  iCndy Armijo

## 2019-04-29 NOTE — PLAN OF CARE
Patient has been pacing the hallway throughout the morning, affect flat, aloof of peers and staff. Patient stated she slept well last night and her appetite is good. Patient displays thought blocking and paranoia upon approach stating she has to careful about what she says and to whom. Patient admits to voices stating they were indimidating her last night but they are slightly better today. Patient does attend groups with minimal participation and stays seclusive to self when out on unit. Patient denies any thoughts to harm self or others, safe environment maintained.

## 2019-04-30 PROCEDURE — 1240000000 HC EMOTIONAL WELLNESS R&B

## 2019-04-30 PROCEDURE — 6370000000 HC RX 637 (ALT 250 FOR IP): Performed by: NURSE PRACTITIONER

## 2019-04-30 PROCEDURE — 99232 SBSQ HOSP IP/OBS MODERATE 35: CPT | Performed by: NURSE PRACTITIONER

## 2019-04-30 RX ORDER — ARIPIPRAZOLE 20 MG/1
20 TABLET ORAL DAILY
Status: DISCONTINUED | OUTPATIENT
Start: 2019-05-01 | End: 2019-05-04 | Stop reason: HOSPADM

## 2019-04-30 RX ADMIN — Medication 3 MG: at 20:46

## 2019-04-30 RX ADMIN — ARIPIPRAZOLE 15 MG: 15 TABLET ORAL at 08:23

## 2019-04-30 RX ADMIN — VORTIOXETINE 10 MG: 10 TABLET, FILM COATED ORAL at 08:23

## 2019-04-30 RX ADMIN — TRIFLUOPERAZINE HYDROCHLORIDE 6 MG: 1 TABLET, FILM COATED ORAL at 20:47

## 2019-04-30 RX ADMIN — PYRIDOXINE HCL TAB 50 MG 50 MG: 50 TAB at 08:23

## 2019-04-30 ASSESSMENT — PAIN - FUNCTIONAL ASSESSMENT: PAIN_FUNCTIONAL_ASSESSMENT: 0-10

## 2019-04-30 NOTE — PROGRESS NOTES
Psychiatric Progress Note Nurse Practitioner  Pertinent History & Psychiatric Examination    HPI: BODØ is seen today in the day area and during treatment team.  She is guarded and preoccupied at times. States that she continues to hear voices but will not elaborate on their content. She appears to be thought blocking. She states she is depressed and feels helpless and hopeless at times. She has been attending groups and is more social in the day area. She states that she does not agree with her current diagnosis of schizophrenia. Discussed with patient that diagnosis is working diagnosis at is assigned based on symptoms of patient on admission and discharge diagnosis can change. Patient states that she feels support from mother but feels her  is overwhelmed at times. Chart and medications reviewed. Therapeutic support, empathetic care and psycho education provided. At this time there is no safe alternative other than inpatient care. Patient feels that she would not be safe to go home.      Complaints of Pain: none  Functioning Relationships: good support system      Mental Status Evaluation:  Orientation: alertness: alert   Mood:. anxious, constricted and depressed      Affect:  blunted, constricted and flat      Appearance:  age appropriate   Activity:  Restless & fidgety   Gait/Posture: Normal   Speech:  soft   Thought Process:  circumstantial   Thought Content:  hallucinations   Sensorium:  person, place, time/date and situation   Cognition:  grossly intact   Memory: intact   Insight:  fair   Judgment: fair   Suicidal Ideations: denies suicidal ideation   Homicidal Ideations: Negative for homicidal ideation      Medication Side Effects: absent       Attention Span attention span appeared shorter than expected for age     Clinical Assessment Medical Decision    Axis I:   Schizophrenia    Precautions with Justification:   None    Medication Review/Mgmt: Medications reviewed with no

## 2019-04-30 NOTE — PLAN OF CARE
No  Memory: Poor Recent, Poor Remote  Insight and Judgment: No  Insight and Judgment: Poor Judgment, Poor Insight  Present Suicidal Ideation: No  Present Homicidal Ideation: No    Daily Assessment Last Entry:   Daily Sleep (WDL): Within Defined Limits         Patient Currently in Pain: Denies  Daily Nutrition (WDL): Within Defined Limits    Patient Monitoring:  Frequency of Checks: 4 times per hour, close    Psychiatric Symptoms:   Depression Symptoms  Depression Symptoms: (Denies)  Anxiety Symptoms  Anxiety Symptoms: (Denies)  Sangeetha Symptoms  Sangeetha Symptoms: No problems reported or observed. Psychosis Symptoms  Delusion Type: No problems reported or observed. Suicide Risk CSSR-S:  1) Within the past month, have you wished you were dead or wished you could go to sleep and not wake up? : Yes(ARMINDA; PT would not disclose when asked, but pt stated yes in ED; pt did nod yes/agree to seek out healthcare staff if her stressors were to be to much/cannot contract for safety)  2) Have you actually had any thoughts of killing yourself? : (ARMINDA; PT would not disclose when asked, but pt stated yes in ED; pt did nod yes/agree to seek out healthcare staff if her stressors were to be to much/cannot contract for safety)  3) Have you been thinking about how you might kill yourself? : (ARMINDA; PT would not disclose when asked, but pt stated yes in ED; pt did nod yes/agree to seek out healthcare staff if her stressors were to be to much/cannot contract for safety)  5) Have you started to work out or worked out the details of how to kill yourself?  Do you intend to carry out this plan? : (ARMINDA; PT would not disclose when asked, but pt stated yes in ED; pt did nod yes/agree to seek out healthcare staff if her stressors were to be to much/cannot contract for safety)  6) Have you ever done anything, started to do anything, or prepared to do anything to end your life?: (ARMINDA; PT would not disclose when asked, but pt stated yes in ED; pt did

## 2019-04-30 NOTE — GROUP NOTE
Group Therapy Note    Date: April 30    Group Start Time: 1430  Group End Time: [address] [place], [place]        Group Therapy Note    Attendees: 13/17         Patient's Goal:    Patient will demonstrate increased interpersonal interaction       Notes:  Patient attended group and participated    Status After Intervention:  Improved    Participation Level:  Active Listener and Interactive    Participation Quality: Appropriate, Attentive and Sharing      Speech:  normal      Thought Process/Content: Logical      Affective Functioning: Congruent      Mood: euthymic      Level of consciousness:  Alert and Oriented x4      Response to Learning: Progressing to goal      Endings: None Reported    Modes of Intervention: Education, Socialization, Problem-solving, Activity and Reality-testing      Discipline Responsible: Psychoeducational Specialist      Signature:  Jadyn Block

## 2019-04-30 NOTE — GROUP NOTE
Group Therapy Note    Date: April 29    Group Start Time: 2030  Group End Time: 2100  Group Topic: Wrap-Up    STMICHA Gonzales               Patient's Goal:  Attend more groups    Status After Intervention:  Improved    Participation Level:  Active Listener    Participation Quality: Appropriate      Speech:  normal      Thought Process/Content: Logical      Affective Functioning: Congruent      Mood: appropriate       Level of consciousness:  Alert      Response to Learning: Able to verbalize current knowledge/experience      Endings: None Reported    Modes of Intervention: Education      Discipline Responsible: [address]      Signature:  Vincenzo Gonzales

## 2019-04-30 NOTE — PLAN OF CARE
Problem: Anger Management/Homicidal Ideation:  Goal: Absence of homicidal ideation  Description  Absence of homicidal ideation  4/29/2019 2007 by Odilia Maharaj  Outcome: Ongoing  Note:   Patient will improve ability to be absent from homicidal ideation     Problem: Depressive Behavior With or Without Suicide Precautions:  Goal: Ability to disclose and discuss suicidal ideas will improve  Description  Ability to disclose and discuss suicidal ideas will improve  4/29/2019 2007 by Odilia Maharaj  Outcome: Ongoing  Note:   Patient will improve ability to disclose and discuss suicidal ideas     Problem: Suicide risk  Goal: Provide patient with safe environment  Description  Provide patient with safe environment  4/29/2019 2007 by Odilia Maharaj  Outcome: Ongoing  Note:   Patient will be provided with a safe environment

## 2019-05-01 PROCEDURE — 1240000000 HC EMOTIONAL WELLNESS R&B

## 2019-05-01 PROCEDURE — 6370000000 HC RX 637 (ALT 250 FOR IP): Performed by: NURSE PRACTITIONER

## 2019-05-01 PROCEDURE — 99232 SBSQ HOSP IP/OBS MODERATE 35: CPT | Performed by: NURSE PRACTITIONER

## 2019-05-01 RX ADMIN — VORTIOXETINE 10 MG: 10 TABLET, FILM COATED ORAL at 08:39

## 2019-05-01 RX ADMIN — PYRIDOXINE HCL TAB 50 MG 50 MG: 50 TAB at 08:39

## 2019-05-01 RX ADMIN — TRIFLUOPERAZINE HYDROCHLORIDE 6 MG: 5 TABLET, FILM COATED ORAL at 21:39

## 2019-05-01 RX ADMIN — Medication 3 MG: at 21:39

## 2019-05-01 RX ADMIN — ARIPIPRAZOLE 20 MG: 20 TABLET ORAL at 08:39

## 2019-05-01 ASSESSMENT — PAIN SCALES - GENERAL: PAINLEVEL_OUTOF10: 0

## 2019-05-01 NOTE — PLAN OF CARE
Problem: Anger Management/Homicidal Ideation:  Goal: Absence of homicidal ideation  Description  Absence of homicidal ideation  4/30/2019 2325 by Vahid Denis RN  Outcome: Ongoing  Note:   Pt denies homicidal ideations at this time. Pt is out, social, and cooperative on the unit. No behavioral issues. Problem: Depressive Behavior With or Without Suicide Precautions:  Goal: Ability to disclose and discuss suicidal ideas will improve  Description  Ability to disclose and discuss suicidal ideas will improve  4/30/2019 2325 by Vahid Denis RN  Outcome: Ongoing  Note:   Pt denies suicidal ideations at this time and agrees to seek assistance from staff should thoughts of self harm arise. Pt is out and social on the unit. Pt attends unit programming and is compliant with medications. She did report some uneasiness regarding her current diagnosis. Emotional support and reassurance given. Pt encouraged to speak with MD during rounds tomorrow to discuss more in depth her diagnosis. Pt is cooperative and agreeable. No physical concerns reported. Safety maintained per unit policy, including q 67A patient safety checks.

## 2019-05-01 NOTE — GROUP NOTE
Group Therapy Note    Date: May 1    Group Start Time: 1000  Group End Time: 2542  Group Topic: Psychotherapy    GALO ORDONEZ INDIA    THOM Phipps                                                                          Group Therapy Note    Date: May 1    Group Start Time: 1000  Group End Time: 5439  Group Topic: Psychotherapy    GALO ORDONEZ INDIA    THOM Phipps        Group Therapy Note    Attendees: 8/13      Patient's Goal:  To discuss emotions and support system    Notes:  Pt participated    Status After Intervention:  Unchanged    Participation Level:  Active Listener    Participation Quality: Appropriate, Attentive and Sharing      Speech:  normal      Thought Process/Content: Logical      Affective Functioning: Congruent      Mood: anxious      Level of consciousness:  Alert, Oriented x4 and Attentive      Response to Learning: Able to verbalize current knowledge/experience, Able to verbalize/acknowledge new learning, Able to retain information and Progressing to goal      Endings: None Reported    Modes of Intervention: Education, Support and Socialization      Discipline Responsible: /Counselor      Signature:  THOM Phipps

## 2019-05-01 NOTE — GROUP NOTE
Group Therapy Note    Date: May 1    Group Start Time: 1330  Group End Time: 6010  Group Topic: Cognitive Skills    [facility]    [place], [address]        Group Therapy Note    Attendees: 7         Patient's Goal:  To demonstrate increased interpersonal interaction     Notes:  Pt attended and participated in group     Status After Intervention:  Unchanged    Participation Level:  Active Listener and Interactive    Participation Quality: Appropriate, Attentive and Sharing      Speech:  normal      Thought Process/Content: Logical      Affective Functioning: Congruent      Mood: euthymic      Level of consciousness:  Alert and Attentive      Response to Learning: Progressing to goal      Endings: None Reported    Modes of Intervention: Socialization, Problem-solving, Activity and Reality-testing      Discipline Responsible: Psychoeducational Specialist      Signature:  Dori Sanon

## 2019-05-01 NOTE — PROGRESS NOTES
Psychiatric Progress Note Nurse Practitioner  Pertinent History & Psychiatric Examination    HPI: Delmar Livingston is seen today in the day area. She is guarded and preoccupied at times. States that she continues to hear voices and they intimidate her. She appears to be thought blocking. She states that she is not depressed or sad but she does not feel like herself yet. Patient anxious about discharge planning. She is meeting with her mother and social work today and is concerned she will not be allowed to go back home. She feels her sleep is much improved. She states her appetite is not good, but partially because she does not like all of the food. Chart and medications reviewed. Therapeutic support, empathetic care and psycho education provided. At this time there is no safe alternative other than inpatient care. Patient feels that she would not be safe to go home.      Complaints of Pain: none  Functioning Relationships: good support system      Mental Status Evaluation:  Orientation: alertness: alert   Mood:. anxious, constricted and depressed      Affect:  blunted, constricted and flat      Appearance:  age appropriate   Activity:  Restless & fidgety   Gait/Posture: Normal   Speech:  soft   Thought Process:  circumstantial   Thought Content:  hallucinations   Sensorium:  person, place, time/date and situation   Cognition:  grossly intact   Memory: intact   Insight:  fair   Judgment: fair   Suicidal Ideations: denies suicidal ideation   Homicidal Ideations: Negative for homicidal ideation      Medication Side Effects: absent       Attention Span attention span appeared shorter than expected for age     Clinical Assessment Medical Decision    Axis I:   Schizophrenia    Precautions with Justification:   None    Medication Review/Mgmt: Medications reviewed with no changes    Medical Issues: See Chart    Assessment of Risk for Harm to Self/Others:  None    PLAN:  · Continue inpatient psychiatric treatment  · Supportive therapy with medication management. Reviewed risks and benefits as well as potential side effects with patient. · Continue home medications. · Abilify was increased to 20 mg 4/30  · Increase Melatonin to 3 mg QHS on 4/28/19  · Review medications for efficacy and side effects. · Therapeutic support and empathetic care provided. · Engage in therapeutic activities and groups. · Follow up at [facility] after symptoms stabilized.       Anticipated Discharge Date: TBD    Patient's Response to Treatment: Positive    Danelle Cancer CNP  5/1/2019  12:33 PM

## 2019-05-01 NOTE — GROUP NOTE
Group Therapy Note    Date: May 1    Group Start Time: 1050  Group End Time: 3904  Group Topic: Community Meeting    GALO OSBORNE    [place], [place]        Group Therapy Note    Attendees: 11         Patient's Goal:  Pt's goal was to eat all of her meals today     Notes:  Pt attended and participated in group     Status After Intervention:  Improved    Participation Level:  Active Listener and Interactive    Participation Quality: Appropriate, Attentive and Sharing      Speech:  normal      Thought Process/Content: Logical      Affective Functioning: Congruent      Mood: euthymic      Level of consciousness:  Alert and Attentive      Response to Learning: Able to retain information and Progressing to goal      Endings: None Reported    Modes of Intervention: Education, Support, Socialization, Problem-solving and Reality-testing      Discipline Responsible: Psychoeducational Specialist      Signature:  Cal Fatima

## 2019-05-01 NOTE — PLAN OF CARE
Pt. Is out in milieu and selectively social. Pt. Attends select groups. Tells staff after attending psychotherapy that she doesn't like groups. \" I don't believe in all that stuff. \" Pt denies depression or anxiety. Denies suicidal thoughts. Pt. Does relate to hearing voices. Says she hears several voices and that some are \"intimidating. \" Pt is medication compliant. Appropriate self care noted. Pt. Remains safe on the unit. Q 15 minute checks for safety maintained.

## 2019-05-01 NOTE — GROUP NOTE
Group Therapy Note    Date: May 1    Group Start Time: 1600  Group End Time: 5649  Group Topic: Healthy Living/Wellness    GALO Crook        Group Therapy Note    Attendees: 7/10       Patient's Goal:  Answer questions about what you would in a situation    Notes: All pts participated. Status After Intervention:  Improved    Participation Level: Active Listener and Interactive    Participation Quality: Appropriate and Attentive      Speech:  normal      Thought Process/Content: Logical      Affective Functioning: Congruent      Mood: controlled      Level of consciousness:  Alert and Oriented x4      Response to Learning: Progressing to goal      Endings: None Reported    Modes of Intervention: Support and Socialization      Discipline Responsible: Behavorial Health Tech      Signature:   Alex Cade

## 2019-05-01 NOTE — GROUP NOTE
Group Therapy Note    Date: May 1    Group Start Time: 1100  Group End Time: 1130  Group Topic: Psychoeducation    [facility]    [address] [place] [address]    patient refused to attend RT group at 1100 after encouragement from staff. 1:1 talk time offered and patient refused.        Signature:  [address] [place] [address]

## 2019-05-01 NOTE — GROUP NOTE
Group Therapy Note      Date: May 1    Group Start Time: 1430  Group End Time: 1210  Group Topic: Recovery    [facility] Debbrah Boast, MSW, PIPPAW        Group Therapy Note    Attendees: 9/12         Patient's Goal:  Increase insight and understanding of addiction and recovery process. Notes:  Pt is making progress as evidenced by participating in group discussion about identifying triggers and negative coping mechanisms. Pt shared positive coping skills that she can utilize in the future. Status After Intervention:  Improved    Participation Level:  Active Listener and Interactive    Participation Quality: Appropriate, Attentive, Sharing and Supportive      Speech:  normal      Thought Process/Content: Logical      Affective Functioning: Congruent      Mood: stable      Level of consciousness:  Alert, Oriented x4 and Attentive      Response to Learning: Able to verbalize current knowledge/experience, Able to verbalize/acknowledge new learning, Able to retain information, Capable of insight, Able to change behavior and Progressing to goal      Endings: None Reported    Modes of Intervention: Education, Support, Socialization and Problem-solving      Discipline Responsible: /Counselor      Signature:  BRENNEN Anderson, THOM

## 2019-05-02 PROCEDURE — 1240000000 HC EMOTIONAL WELLNESS R&B

## 2019-05-02 PROCEDURE — 99232 SBSQ HOSP IP/OBS MODERATE 35: CPT | Performed by: NURSE PRACTITIONER

## 2019-05-02 PROCEDURE — 6370000000 HC RX 637 (ALT 250 FOR IP): Performed by: NURSE PRACTITIONER

## 2019-05-02 RX ADMIN — TRIFLUOPERAZINE HYDROCHLORIDE 6 MG: 5 TABLET, FILM COATED ORAL at 20:53

## 2019-05-02 RX ADMIN — ARIPIPRAZOLE 20 MG: 20 TABLET ORAL at 09:03

## 2019-05-02 RX ADMIN — ERGOCALCIFEROL 50000 UNITS: 1.25 CAPSULE ORAL at 09:03

## 2019-05-02 RX ADMIN — Medication 3 MG: at 20:53

## 2019-05-02 RX ADMIN — PYRIDOXINE HCL TAB 50 MG 50 MG: 50 TAB at 09:03

## 2019-05-02 RX ADMIN — VORTIOXETINE 10 MG: 10 TABLET, FILM COATED ORAL at 09:03

## 2019-05-02 ASSESSMENT — PAIN SCALES - GENERAL: PAINLEVEL_OUTOF10: 2

## 2019-05-02 NOTE — GROUP NOTE
Group Therapy Note    Date: May 2    Group Start Time: 1000  Group End Time: 1940  Group Topic: Group Therapy    [facility]    BRENNEN Jackson LSW        Group Therapy Note    Attendees: 10         Patient's Goal:  Patient will demonstrate increased interpersonal interaction     Notes:  Pt was an active participant in group discussion    Status After Intervention:  Unchanged    Participation Level:  Active Listener and Interactive    Participation Quality: Appropriate, Attentive, Sharing and Supportive      Speech:  normal      Thought Process/Content: Logical      Affective Functioning: Congruent      Mood: anxious      Level of consciousness:  Alert, Oriented x4 and Attentive      Response to Learning: Able to change behavior and Progressing to goal      Endings: None Reported    Modes of Intervention: Support, Socialization, Exploration and Clarifying      Discipline Responsible: /Counselor      Signature:  BRENNEN Jackson LSW

## 2019-05-02 NOTE — FLOWSHEET NOTE
05/02/19 1330   Encounter Summary   Services provided to: Patient   Referral/Consult From:   (Jew service)   Continue Visiting   (5/2/19)   Complexity of Encounter Low   Length of Encounter 30 minutes   Spiritual/Orthodox   Type Spiritual support   Intervention Prayer;Scripture; Discussed relationship with God;Discussed belief system/Pentecostalism practices/catherine

## 2019-05-02 NOTE — BH NOTE
All assessments and charting done by LPN reviewed.     Electronically signed by Janora Lefort, RN on 5/1/2019 at 11:15 PM

## 2019-05-02 NOTE — GROUP NOTE
Group Therapy Note    Date: May 1    Group Start Time: 2015  Group End Time: 2100  Group Topic: Wrap-Up    [facility] MERY Duenas RN        Group Therapy Note    Attendees: 9/10       Patient's Goal:    1. To be able to reflect on daily unit activities/experiences. 2.  To review accomplished daily goals and encourage patient to set a new goal for the next day. 3.  To demonstrate increased interpersonal interaction      Notes:  Pt attended and actively participated in Wrap-Up/Relaxation groups this evening. Status After Intervention:  Improved     Participation Level:  Active Listener and Interactive     Participation Quality: Appropriate, Attentive and Sharing     Speech:  normal     Thought Process/Content: Logical     Affective Functioning: Congruent     Mood: euthymic     Level of consciousness:  Alert, Oriented x4 and Attentive     Response to Learning: Able to verbalize current knowledge/experience, Able to verbalize/acknowledge new learning, Able to retain information and Capable of insight     Endings: None Reported     Modes of Intervention: Education, Support and Socialization      Discipline Responsible: Registered Nurse        Signature:  Olena Elder RN

## 2019-05-02 NOTE — PROGRESS NOTES
Psychiatric Progress Note Nurse Practitioner  Pertinent History & Psychiatric Examination    HPI: BODØ is seen today in the day area. She states she is still hearing some voices that make her less self confident. Her thought blocking is lessened today and there is more spontaneity in thought. She is more animated today and smiles several times during the conversation. Has been attending select group, but still aloof of peers. She feels her sleep is much improved. She states her appetite is not good, but partially because she does not like all of the food. Chart and medications reviewed. Therapeutic support, empathetic care and psycho education provided. At this time there is no safe alternative other than inpatient care. Patient feels that she would not be safe to go home. Complaints of Pain: none  Functioning Relationships: good support system      Mental Status Evaluation:  Orientation: alertness: alert   Mood:. anxious, constricted and depressed      Affect:  blunted, constricted and flat      Appearance:  age appropriate   Activity:  Restless & fidgety   Gait/Posture: Normal   Speech:  soft   Thought Process:  circumstantial   Thought Content:  hallucinations   Sensorium:  person, place, time/date and situation   Cognition:  grossly intact   Memory: intact   Insight:  fair   Judgment: fair   Suicidal Ideations: denies suicidal ideation   Homicidal Ideations: Negative for homicidal ideation      Medication Side Effects: absent       Attention Span attention span appeared shorter than expected for age     Clinical Assessment Medical Decision    Axis I:   Schizophrenia    Precautions with Justification:   None    Medication Review/Mgmt: Medications reviewed with no changes    Medical Issues: See Chart    Assessment of Risk for Harm to Self/Others:  None    PLAN:  · Continue inpatient psychiatric treatment  · Supportive therapy with medication management.  Reviewed risks and benefits as well as potential side effects with patient. · Continue home medications. · Abilify was increased to 20 mg 4/30  · Increase Melatonin to 3 mg QHS on 4/28/19  · Review medications for efficacy and side effects. · Therapeutic support and empathetic care provided. · Engage in therapeutic activities and groups. · [facility] after symptoms stabilized.       Anticipated Discharge Date: TBD    Patient's Response to Treatment: Positive    Nati Carranza CNP  5/2/2019  4:49 PM

## 2019-05-02 NOTE — GROUP NOTE
Group Therapy Note    Date: May 2    Group Start Time: 1430  Group End Time: 2800  Group Topic: Recreational    [facility] MERY Keyes, CTRS             Patient's Goal:  Patient will demonstrate improved interpersonal skills. Notes:  Patient was initially hesitant to participate in group and wanted to observe, but after encouragement from writer and peers patient actively participated in task at hand. Status After Intervention:  Improved    Participation Level:  Active Listener and Interactive    Participation Quality: Appropriate, Attentive and Sharing      Speech:  normal      Thought Process/Content: Logical      Affective Functioning: Congruent      Level of consciousness:  Alert and Attentive      Response to Learning: Able to verbalize current knowledge/experience, Able to verbalize/acknowledge new learning, Able to retain information, Capable of insight and Progressing to goal      Endings: None Reported    Modes of Intervention: Socialization, Exploration, Problem-solving, Activity, Confrontation, Limit-setting and Reality-testing      Discipline Responsible: Psychoeducational Specialist      Signature:  Lisa Perez

## 2019-05-02 NOTE — PLAN OF CARE
Problem: Anger Management/Homicidal Ideation:  Goal: Absence of homicidal ideation  Description  Absence of homicidal ideation  Note:   Patient denied homicidal ideations. Patient did however report hearing voices telling her to kill someone she encountered 20 years ago. Patient said she did not want to kill them and that she never would      Problem: Depressive Behavior With or Without Suicide Precautions:  Goal: Ability to disclose and discuss suicidal ideas will improve  Description  Ability to disclose and discuss suicidal ideas will improve  Note:   Patient denied suicidal ideations.

## 2019-05-03 PROCEDURE — 99232 SBSQ HOSP IP/OBS MODERATE 35: CPT | Performed by: NURSE PRACTITIONER

## 2019-05-03 PROCEDURE — 6370000000 HC RX 637 (ALT 250 FOR IP): Performed by: NURSE PRACTITIONER

## 2019-05-03 PROCEDURE — 90833 PSYTX W PT W E/M 30 MIN: CPT | Performed by: NURSE PRACTITIONER

## 2019-05-03 PROCEDURE — 1240000000 HC EMOTIONAL WELLNESS R&B

## 2019-05-03 RX ADMIN — ARIPIPRAZOLE 20 MG: 20 TABLET ORAL at 07:59

## 2019-05-03 RX ADMIN — VORTIOXETINE 10 MG: 10 TABLET, FILM COATED ORAL at 07:59

## 2019-05-03 RX ADMIN — TRIFLUOPERAZINE HYDROCHLORIDE 6 MG: 5 TABLET, FILM COATED ORAL at 21:27

## 2019-05-03 RX ADMIN — PYRIDOXINE HCL TAB 50 MG 50 MG: 50 TAB at 07:58

## 2019-05-03 RX ADMIN — Medication 3 MG: at 21:27

## 2019-05-03 ASSESSMENT — PAIN SCALES - GENERAL: PAINLEVEL_OUTOF10: 0

## 2019-05-03 NOTE — GROUP NOTE
Group Therapy Note    Date: May 3    Group Start Time: 1330  Group End Time: 1430  Group Topic: Psychoeducation    BARNEY Osorio           Patient's Goal:  To increase awareness of mental illnesses through educational activity in order to better be able to advocate for self. Notes:  Patient attended group and actively participated in task at hand. Patient conversed appropriately with peers and [address]. Status After Intervention:  Improved    Participation Level:  Active Listener and Interactive    Participation Quality: Appropriate, Attentive, Sharing and Supportive      Speech:  normal      Thought Process/Content: Logical      Affective Functioning: Congruent      Level of consciousness:  Alert and Attentive      Response to Learning: Able to verbalize current knowledge/experience, Able to verbalize/acknowledge new learning, Able to retain information, Capable of insight and Progressing to goal      Endings: None Reported    Modes of Intervention: Education, Support, Socialization, Exploration, Clarifying, Problem-solving, Activity, Confrontation, Limit-setting and Reality-testing      Discipline Responsible: Psychoeducational Specialist      Signature:  Shira Beauchamp

## 2019-05-03 NOTE — PLAN OF CARE
Problem: Anger Management/Homicidal Ideation:  Goal: Absence of homicidal ideation  Description  Absence of homicidal ideation  5/2/2019 2156 by Deborah Roche LPN  Outcome: Ongoing  Note:   Patient denies homicidal ideations at this time. Problem: Depressive Behavior With or Without Suicide Precautions:  Goal: Ability to disclose and discuss suicidal ideas will improve  Description  Ability to disclose and discuss suicidal ideas will improve  5/2/2019 2156 by Deborah Roche LPN  Outcome: Ongoing  Note:   Denies suicidal ideations at this time. Encouraged to seek staff with thoughts of harm.

## 2019-05-03 NOTE — GROUP NOTE
Group Therapy Note    Date: May 3    Group Start Time: 1600  Group End Time: 1630  Group Topic: Psychotherapy    [facility]    Rajni Kulkarni, [place]ICK[place]        Group Therapy Note    Attendees: 2  Patient did not attend 1600 group therapy despite encouragement to attend. 1:1 offered and refused.              Signature:  Rajni Kulkarni, VICKIE

## 2019-05-03 NOTE — PROGRESS NOTES
Psychiatric Progress Note Nurse Practitioner  Pertinent History & Psychiatric Examination    HPI: Anne Fox is seen today in the day area. She has just finished visiting with her sister and mother and is brightened from the interaction. She displays more spontaneity of thought today. She is initiating some conversations. She states she still hears voices that are demeaning to her, but they are not as bothersome. She is smiling and laughing at times. She asks some questions about medications and if she should nap during the day and if that will effect sleep. Medication education given to patient. Sleep hygiene discussed with patient. Discussed goals for when she is discharged and hobbies she would like to start doing to help her pass the time during the day. She states she does not like therapy, but I encouraged her to try to attend groups and then try some of the day programming at [facility] at discharge. I recommended the coping skills and self esteem groups. She states she is willing to try if it will help. Patient's insight is improving. Chart and medications reviewed. Therapeutic support, empathetic care and psycho education provided. At this time there is no safe alternative other than inpatient care. Patient voices readiness for discharge in 1-2 days.     Complaints of Pain: none  Functioning Relationships: good support system      Mental Status Evaluation:  Orientation: alertness: alert   Mood:.  constricted       Affect:  flat      Appearance:  age appropriate   Activity:  Within normal limits   Gait/Posture: Normal   Speech:  soft   Thought Process:  circumstantial   Thought Content:  hallucinations   Sensorium:  person, place, time/date and situation   Cognition:  grossly intact   Memory: intact   Insight:  fair   Judgment: fair   Suicidal Ideations: denies suicidal ideation   Homicidal Ideations: Negative for homicidal ideation      Medication Side Effects: absent       Attention Span attention span appeared shorter than expected for age     Clinical Assessment Medical Decision    Axis I:   Schizophrenia    Precautions with Justification:   None    Medication Review/Mgmt: Medications reviewed with no changes    Medical Issues: See Chart    Assessment of Risk for Harm to Self/Others:  None    PLAN:  · Continue inpatient psychiatric treatment  · Supportive therapy with medication management. Reviewed risks and benefits as well as potential side effects with patient. · Continue home medications. · Abilify was increased to 20 mg 4/30  · Increase Melatonin to 3 mg QHS on 4/28/19  · Review medications for efficacy and side effects. · Therapeutic support and empathetic care provided. · Engage in therapeutic activities and groups. · Follow up at [facility] after symptoms stabilized. · Provided 25 minutes if supportive psychotherapy including empathic listening, support, validation, and psycho education. Discussed coping skills, therapy, and medication management.       Anticipated Discharge Date: 1-2 days    Patient's Response to Treatment: Positive    Sonido Jefferson CNP  5/3/2019  3:09 PM

## 2019-05-03 NOTE — GROUP NOTE
Group Therapy Note    Date: May 3    Group Start Time: 6111  Group End Time: 1520  Group Topic: Recovery    [facility]        Group Therapy Note    Attendees: 7         Patient's Goal:  To demonstrate increased interpersonal interaction     Notes:  PT attended group but did not wish to participate    Status After Intervention:  Unchanged    Participation Level:  Active Listener    Participation Quality: Resistant      Speech:  normal      Thought Process/Content: Logical      Affective Functioning: Congruent      Mood: euthymic      Level of consciousness:  Alert and Attentive      Response to Learning: Progressing to goal      Endings: None Reported    Modes of Intervention: Socialization, Problem-solving, Activity and Reality-testing      Discipline Responsible: Psychoeducational Specialist      Signature:  Newton Baker

## 2019-05-03 NOTE — GROUP NOTE
Group Therapy Note    Date: May 3    Group Start Time: 1015  Group End Time: 1422  Group Topic: Psychotherapy    [facility]    BRENNEN Shannon LSW        Group Therapy Note    Attendees: 9         Patient's Goal:  To demonstrate increased social interaction. Notes:   Group started later than planned, due to the unanticipated duration of tx team.      Status After Intervention:  Improved    Participation Level:  Active Listener    Participation Quality: Attentive      Speech:  normal      Thought Process/Content: Logical      Affective Functioning: Congruent      Mood: anxious      Level of consciousness:  Alert      Response to Learning: Progressing to goal      Endings: None Reported    Modes of Intervention: Support      Discipline Responsible: /Counselor      Signature:  BRENNEN Shannon LSW

## 2019-05-03 NOTE — GROUP NOTE
Group Therapy Note    Date: May 3    Group Start Time: 0905  Group End Time: 0920  Group Topic: Community Meeting    GALO MERY HONEYCUTT    [place], [place]           Patient's Goal for Today:  Eat all meals today. Talk with nurse practitioner. Status After Intervention:  Improved    Participation Level:  Active Listener and Interactive    Participation Quality: Appropriate, Attentive and Sharing      Speech:  normal      Thought Process/Content: Logical      Affective Functioning: Congruent      Level of consciousness:  Alert and Attentive      Response to Learning: Able to verbalize current knowledge/experience, Able to verbalize/acknowledge new learning, Able to retain information, Capable of insight and Progressing to goal      Endings: None Reported    Modes of Intervention: Education, Support, Socialization, Exploration, Clarifying, Problem-solving, Confrontation, Limit-setting and Reality-testing      Discipline Responsible: Psychoeducational Specialist      Signature:  Tyler Zapata

## 2019-05-04 VITALS
HEART RATE: 74 BPM | BODY MASS INDEX: 29.41 KG/M2 | HEIGHT: 66 IN | RESPIRATION RATE: 14 BRPM | WEIGHT: 183 LBS | DIASTOLIC BLOOD PRESSURE: 70 MMHG | OXYGEN SATURATION: 100 % | SYSTOLIC BLOOD PRESSURE: 111 MMHG | TEMPERATURE: 98.1 F

## 2019-05-04 PROCEDURE — 99238 HOSP IP/OBS DSCHRG MGMT 30/<: CPT | Performed by: NURSE PRACTITIONER

## 2019-05-04 PROCEDURE — 6370000000 HC RX 637 (ALT 250 FOR IP): Performed by: NURSE PRACTITIONER

## 2019-05-04 PROCEDURE — 5130000000 HC BRIDGE APPOINTMENT

## 2019-05-04 RX ORDER — ARIPIPRAZOLE 20 MG/1
20 TABLET ORAL DAILY
Qty: 14 TABLET | Refills: 0 | Status: SHIPPED | OUTPATIENT
Start: 2019-05-05 | End: 2020-09-22 | Stop reason: ALTCHOICE

## 2019-05-04 RX ORDER — TRAZODONE HYDROCHLORIDE 50 MG/1
50 TABLET ORAL NIGHTLY PRN
Qty: 14 TABLET | Refills: 0 | Status: SHIPPED | OUTPATIENT
Start: 2019-05-04

## 2019-05-04 RX ORDER — CHOLECALCIFEROL (VITAMIN D3) 25 MCG
3 TABLET ORAL NIGHTLY
Qty: 14 TABLET | Refills: 0 | Status: SHIPPED | OUTPATIENT
Start: 2019-05-04 | End: 2022-05-12 | Stop reason: CLARIF

## 2019-05-04 RX ORDER — HYDROXYZINE 50 MG/1
50 TABLET, FILM COATED ORAL 3 TIMES DAILY PRN
Qty: 30 TABLET | Refills: 0 | Status: SHIPPED | OUTPATIENT
Start: 2019-05-04 | End: 2019-05-14

## 2019-05-04 RX ADMIN — ARIPIPRAZOLE 20 MG: 20 TABLET ORAL at 08:06

## 2019-05-04 RX ADMIN — VORTIOXETINE 10 MG: 10 TABLET, FILM COATED ORAL at 08:06

## 2019-05-04 RX ADMIN — PYRIDOXINE HCL TAB 50 MG 50 MG: 50 TAB at 08:06

## 2019-05-04 ASSESSMENT — PAIN SCALES - GENERAL: PAINLEVEL_OUTOF10: 0

## 2019-05-04 NOTE — PROGRESS NOTES
Bridge Appointment completed: Reviewed Discharge Instructions with patient. Patient verbalizes understanding and agreement with the discharge plan using the teachback method.        Discharge Arrangements:  [address] [facility] [address] [facility]  [phone]  5/6/2019  @ 11:30am for medication management   Guardian notified: N/A   Discharge destination/address:  [place] [address]       Transported by:  Family

## 2019-05-04 NOTE — GROUP NOTE
Group Therapy Note    Date: May 3    Group Start Time: 2020  Group End Time: 2100  Group Topic: Wrap-Up    [facility] Artie Knight RN        Group Therapy Note    Attendees: 7/14       Patient's Goal:    1. To be able to reflect on daily unit activities/experiences. 2.  To review accomplished daily goals and encourage patient to set a new goal for the next day. 3.  To demonstrate increased interpersonal interaction      Notes:  Pt attended and actively participated in Wrap-Up/Relaxation groups this evening. Status After Intervention:  Improved     Participation Level:  Active Listener and Interactive     Participation Quality: Appropriate, Attentive and Sharing     Speech:  normal     Thought Process/Content: Logical     Affective Functioning: Congruent     Mood: euthymic     Level of consciousness:  Alert, Oriented x4 and Attentive     Response to Learning: Able to verbalize current knowledge/experience, Able to verbalize/acknowledge new learning, Able to retain information and Capable of insight     Endings: None Reported     Modes of Intervention: Education, Support and Socialization      Discipline Responsible: Registered Nurse        Signature:  Edith Manuel RN

## 2019-05-04 NOTE — PLAN OF CARE
Problem: Depressive Behavior With or Without Suicide Precautions:  Goal: Ability to disclose and discuss suicidal ideas will improve  Description  Ability to disclose and discuss suicidal ideas will improve  5/4/2019 1230 by Kaylee Lopez  Outcome: Ongoing  Note:   Pt is accepting of 1:1 talk time with staff. Pt denies all but voices but states they are improving. Pt is med compliant. Reassurance and support provided. Q15 min checks maintained Pt remains  safe at this time. Problem: Suicide risk  Goal: Provide patient with safe environment  Description  Provide patient with safe environment  5/4/2019 1230 by Kaylee Lopez  Outcome: Ongoing  Note:   Pt is provided with a safe environment. Pt denies Si/HI and verbally agrees to approach staff if thoughts of self harm arises.

## 2019-05-04 NOTE — BH NOTE
[address] [facility]  Discharge Note    Pt discharged with followings belongings:   Dentures: None  Vision - Corrective Lenses: Glasses  Hearing Aid: None  Jewelry: Ring(2 rings)  Clothing: Footwear, Jacket / coat, Pants, Shirt, Pajamas, Undergarments (Comment), Other (Comment)(blue long sleeve shirt, hair tie,bra, brown bag, white pants w/string, red and blue jacket)  Were All Patient Medications Collected?: Not Applicable  Other Valuables: Money (Comment)($0.07)   Valuables sent home with patient at the time of discharge. Valuables retrieved from [email or web address] envelope number:  [id number] and returned to patient. Patient left department with Departure Mode: Family member via Mobility at Departure: Ambulatory, discharged to Discharged to: Private Residence. Patient education on aftercare instructions: YES  Information faxed to [facility] by Cory Whiting Patient verbalize understanding of AVS:  YES. Status EXAM upon discharge:  Status and Exam  Normal: No  Facial Expression: Flat  Affect: Congruent  Level of Consciousness: Alert  Mood:Normal: No  Mood: Depressed, Anxious  Motor Activity:Normal: Yes  Motor Activity: Increased  Interview Behavior: Cooperative  Preception: [place] to Person, Tammi Stack to Time, [place] to Place, [place] to Situation  Attention:Normal: No  Attention: Distractible  Thought Processes: Circumstantial  Thought Content:Normal: No  Thought Content: Preoccupations  Hallucinations:  Auditory (Comment)  Delusions: No  Delusions: Persecution  Memory:Normal: No  Memory: Poor Recent  Insight and Judgment: No  Insight and Judgment: Poor Insight  Present Suicidal Ideation: No  Present Homicidal Ideation: No    Rehan Martinez LPN

## 2019-05-04 NOTE — DISCHARGE INSTR - PHARMACY
improve. Keep using the medication as directed and tell your doctor if your symptoms do not improve. Do not stop using trazodone suddenly, or you could have unpleasant withdrawal symptoms. Ask your doctor how to safely stop using trazodone. Store at room temperature away from moisture, heat, and light. What happens if I miss a dose? Take the missed dose as soon as you remember. Skip the missed dose if it is almost time for your next scheduled dose. Do not  take extra medicine to make up the missed dose. What happens if I overdose? Seek emergency medical attention or call the Poison Help line at 1-563.301.1074. An overdose of trazodone can be fatal when it is taken with alcohol, barbiturates such as phenobarbital, or sedatives such as diazepam (Valium). Overdose symptoms may include extreme drowsiness, vomiting, penis erection that is painful or prolonged, fast or pounding heartbeat, seizure (black-out or convulsions), or breathing that slows or stops. What should I avoid while taking trazodone? Do not drink alcohol. Trazodone can increase the effects of alcohol, which could be dangerous. Avoid getting up too fast from a sitting or lying position, or you may feel dizzy. Get up slowly and steady yourself to prevent a fall. Trazodone may impair your thinking or reactions. Be careful if you drive or do anything that requires you to be alert. Ask your doctor before taking a nonsteroidal anti-inflammatory drug (NSAID) for pain, arthritis, fever, or swelling. This includes aspirin, ibuprofen (Advil, Motrin), naproxen (Aleve), celecoxib (Celebrex), diclofenac, indomethacin, meloxicam, and others. Using an NSAID with trazodone may cause you to bruise or bleed easily. What are the possible side effects of trazodone? Stop taking trazodone and call your doctor at once if you have a penis erection that is painful or lasts 6 hours or longer.  This is a medical emergency and could lead to a serious condition that must be corrected with surgery. Get emergency medical help if you have signs of an allergic reaction:  hives; difficulty breathing; swelling of your face, lips, tongue, or throat. Report any new or worsening symptoms to your doctor, such as: mood or behavior changes, anxiety, panic attacks, trouble sleeping, or if you feel impulsive, irritable, agitated, hostile, aggressive, restless, hyperactive (mentally or physically), more depressed, or have thoughts about suicide or hurting yourself. Call your doctor at once if you have:  · blurred vision, tunnel vision, eye pain or swelling, or seeing halos around lights;  · headache with chest pain and severe dizziness, fainting, fast or pounding heartbeats;  · chest pain or pressure, tight feeling in your neck or jaw, sweating, pain spreading to your arm or shoulder;  · high levels of serotonin in the body --agitation, hallucinations, fever, fast heart rate, overactive reflexes, nausea, vomiting, diarrhea, loss of coordination, fainting;  · low levels of sodium in the body --headache, confusion, slurred speech, severe weakness, vomiting, loss of coordination, feeling unsteady; or  · severe nervous system reaction --very stiff (rigid) muscles, high fever, sweating, confusion, fast or uneven heartbeats, tremors, feeling like you might pass out. Common side effects may include:  · drowsiness, dizziness;  · vision changes;  · constipation; or  · dry mouth, altered sense of taste. This is not a complete list of side effects and others may occur. Call your doctor for medical advice about side effects. You may report side effects to FDA at 1-173-FDA-1635. What other drugs will affect trazodone? Taking this medicine with other drugs that make you sleepy can worsen this effect. Ask your doctor before taking trazodone with a sleeping pill, narcotic pain medicine, muscle relaxer, or medicine for anxiety, depression, or seizures. Many drugs can interact with trazodone.  Not all service as a supplement to, and not a substitute for, the expertise, skill, knowledge and judgment of healthcare practitioners. The absence of a warning for a given drug or drug combination in no way should be construed to indicate that the drug or drug combination is safe, effective or appropriate for any given patient. [facility] does not assume any responsibility for any aspect of healthcare administered with the aid of information [facility] provides. The information contained herein is not intended to cover all possible uses, directions, precautions, warnings, drug interactions, allergic reactions, or adverse effects. If you have questions about the drugs you are taking, check with your doctor, nurse or pharmacist.  Copyright 1260-4042 [address]: 9.03. Revision date: 9/25/2015. Care instructions adapted under license by [facility] ([facility]). If you have questions about a medical condition or this instruction, always ask your healthcare professional. [place] [address] any warranty or liability for your use of this information.

## 2019-05-04 NOTE — GROUP NOTE
Group Therapy Note    Date: May 4    Group Start Time: 0900  Group End Time: 0930  Group Topic: Community Meeting    [address] [place] [address]        Group Therapy Note    Attendees: 6/17         Patient's Goal:  Patient will demonstrate increased interpersonal interaction     Notes:  Patient attended group and participated fully. Status After Intervention:  Improved    Participation Level:  Active Listener and Interactive    Participation Quality: Appropriate, Attentive and Sharing      Speech:  normal      Thought Process/Content: Logical      Affective Functioning: Congruent      Mood: euthymic      Level of consciousness:  Alert and Oriented x4      Response to Learning: Progressing to goal      Endings: None Reported    Modes of Intervention: Education, Socialization, Problem-solving, Activity and Reality-testing      Discipline Responsible: Psychoeducational Specialist      Signature:  Princess Harley

## 2019-05-04 NOTE — DISCHARGE SUMMARY
DISCHARGE SUMMARY  Patient ID:  Dayday Green  [id number]  78 y.o.  1970    Admit date: 4/22/2019    Discharge date and time: 5/4/2019  12:06 PM     Admitting Physician: Gerardo Moss MD     Discharge Physician:  RAFAELA Sequeira CNP    Admission Diagnoses: Schizophrenia [facility]) [F20.9]    Discharge Diagnoses:   Schizoaffective disorder, depressive type [facility])     Patient Active Problem List   Diagnosis Code    Schizophrenia ([facility] [address] F20.9    Suicidal ideation R45.851    Schizoaffective disorder, depressive type ([facility] [address] F25.1        Admission Condition: poor    Discharged Condition: stable    Indication for Admission: threat to self    History of Present Illnes (present tense wording indicates findings from admission exam on 4/22/2019 and are not necessarily indicative of current findings): Hospital Course:   Upon admission, Dayday Green was provided a safe secure environment, introduced to unit milieu. Patient participated in groups and individual therapies. Meds were adjusted. After few days of hospital care, patient began to feel improvement. Depression lifted, thoughts to harm self ceased. Sleep improved, appetite was good. On morning rounds 5/4/2019, patient endorsed feeling ready for discharge. Patient denies suicidal or homicidal ideations, or delusions. Has auditory hallucinations at baseline, are not as bothersome to patient. Denies SE's from meds. It was decided that pt had achieved maximum benefit from hospital care and can be discharged     Consults:   None    Significant Diagnostic Studies: Routine labs and diagnostics    Treatments: Psychotropic medications, therapy with group, milieu, and 1:1 with nurses, social workers, JANNET/CNP, and Attending physician.       Discharge Medications:  Current Discharge Medication List      START taking these medications    Details   hydrOXYzine (ATARAX) 50 MG tablet Take 1 tablet by mouth 3 times daily as needed for Anxiety  Qty: 30 tablet, Refills: 0      traZODone (DESYREL) 50 MG tablet Take 1 tablet by mouth nightly as needed for Sleep  Qty: 14 tablet, Refills: 0      melatonin ER 3 MG TBCR Take 3 mg by mouth nightly  Qty: 14 tablet, Refills: 0         CONTINUE these medications which have CHANGED    Details   ARIPiprazole (ABILIFY) 20 MG tablet Take 1 tablet by mouth daily  Qty: 14 tablet, Refills: 0         CONTINUE these medications which have NOT CHANGED    Details   trifluoperazine (STELAZINE) 2 MG tablet Take 6 mg by mouth nightly      VORTIoxetine (TRINTELLIX) 10 MG TABS tablet Take 10 mg by mouth daily      vitamin B-6 (PYRIDOXINE) 50 MG tablet Take 50 mg by mouth daily      vitamin D (CHOLECALCIFEROL) 1000 UNIT TABS tablet Take 50,000 Units by mouth Twice a Week          STOP taking these medications       glucose (GLUTOSE) 40 % GEL Comments:   Reason for Stopping:         Melatonin-Pyridoxine (MELATIN PO) Comments:   Reason for Stopping:                Discharge Exam:  Level of consciousness:  Within normal limits  Appearance: Street clothes, seated, with good grooming  Behavior/Motor: No abnormalities noted  Attitude toward examiner:  Cooperative, attentive, good eye contact  Speech:  spontaneous, normal rate, normal volume and well articulated  Mood:  euthymic  Affect:  mood congruent  Thought processes:  linear, goal directed and coherent  Thought content:  Homocidal ideation denies  Suicidal Ideation:  denies suicidal ideation  Delusions:  no evidence of delusions  Perceptual Disturbance: Auditory hallucinations at baseline  Cognition:  In tact  Memory: age appropriate  Insight & Judgement: fair  Medication side effects:  denies     Disposition: home    Patient Instructions: Activity: activity as tolerated    Follow-up as scheduled with [facility]     Time Spent: 35 minutes    Engagement: Patient displayed a good level of engagement with the treatments offered during this admission.        Discharge planning, findings, and

## 2019-05-04 NOTE — PLAN OF CARE
Problem: Anger Management/Homicidal Ideation:  Goal: Absence of homicidal ideation  Description  Absence of homicidal ideation  Outcome: Ongoing  Note:   Denies homicidal ideations at this time. Problem: Depressive Behavior With or Without Suicide Precautions:  Goal: Ability to disclose and discuss suicidal ideas will improve  Description  Ability to disclose and discuss suicidal ideas will improve  Outcome: Ongoing  Note:   Denies suicidal ideations at this time.

## 2019-05-06 NOTE — CARE COORDINATION
information I should know about trazodone? You should not use trazodone if you are allergic to it, or if you are being treated with methylene blue injection. Do not use trazodone if you have taken an MAO inhibitor in the past 14 days. A dangerous drug interaction could occur. MAO inhibitors include isocarboxazid, linezolid, phenelzine, rasagiline, selegiline, and tranylcypromine. Some young people have thoughts about suicide when first taking an antidepressant. Your doctor will need to check your progress at regular visits while you are using trazodone. Your family or other caregivers should also be alert to changes in your mood or symptoms. Report any new or worsening symptoms to your doctor, such as: mood or behavior changes, anxiety, panic attacks, trouble sleeping, or if you feel impulsive, irritable, agitated, hostile, aggressive, restless, hyperactive (mentally or physically), more depressed, or have thoughts about suicide or hurting yourself. Do not give this medicine to anyone younger than 25years old without the advice of a doctor. Trazodone is not approved for use in children. What is trazodone? Trazodone is an antidepressant medicine. It affects chemicals in the brain that may be unbalanced in people with depression. Trazodone is used to treat major depressive disorder. Trazodone may also be used for purposes not listed in this medication guide. What should I discuss with my healthcare provider before taking trazodone? You should not use trazodone if you are allergic to it, or if you are being treated with methylene blue injection. Do not use trazodone if you have taken an MAO inhibitor in the past 14 days. A dangerous drug interaction could occur. MAO inhibitors include isocarboxazid, linezolid, phenelzine, rasagiline, selegiline, and tranylcypromine. After you stop taking trazodone, you must wait at least 14 days before you start taking an MAOI.   To make sure trazodone is safe for you, tell your doctor if you have:  · liver or kidney disease;  · heart disease;  · a bleeding or blood clotting disorder;  · seizures or epilepsy;  · narrow-angle glaucoma;  · bipolar disorder (manic depression);  · a history of Long QT syndrome;  · a history of drug abuse or suicidal thoughts; or  · if you have recently had a heart attack. Some young people have thoughts about suicide when first taking an antidepressant. Your doctor will need to check your progress at regular visits while you are using trazodone. Your family or other caregivers should also be alert to changes in your mood or symptoms. Taking an SSRI antidepressant during pregnancy may cause serious lung problems or other complications in the baby. However, you may have a relapse of depression if you stop taking your antidepressant. Tell your doctor right away if you become pregnant while taking trazodone. Do not start or stop taking this medicine during pregnancy without your doctor's advice. It is not known whether trazodone passes into breast milk or if it could harm a nursing baby. Tell your doctor if you are breast-feeding a baby. Do not give this medicine to anyone younger than 25years old without the advice of a doctor. Trazodone is not approved for use in children. How should I take trazodone? Follow all directions on your prescription label. Your doctor may occasionally change your dose to make sure you get the best results. Do not take this medicine in larger or smaller amounts or for longer than recommended. The trazodone immediate-release tablet should be taken after a meal or a snack. Take Oleptro on an empty stomach at bedtime or late in the evening. Do not crush, chew, or break an extended-release tablet. Swallow it whole. You may break an Oleptro tablet in half along the score line if needed. It may take up to 2 weeks before your symptoms improve.  Keep using the medication as directed and tell your doctor if your symptoms do not reaction:  hives; difficulty breathing; swelling of your face, lips, tongue, or throat. Report any new or worsening symptoms to your doctor, such as: mood or behavior changes, anxiety, panic attacks, trouble sleeping, or if you feel impulsive, irritable, agitated, hostile, aggressive, restless, hyperactive (mentally or physically), more depressed, or have thoughts about suicide or hurting yourself. Call your doctor at once if you have:  · blurred vision, tunnel vision, eye pain or swelling, or seeing halos around lights;  · headache with chest pain and severe dizziness, fainting, fast or pounding heartbeats;  · chest pain or pressure, tight feeling in your neck or jaw, sweating, pain spreading to your arm or shoulder;  · high levels of serotonin in the body --agitation, hallucinations, fever, fast heart rate, overactive reflexes, nausea, vomiting, diarrhea, loss of coordination, fainting;  · low levels of sodium in the body --headache, confusion, slurred speech, severe weakness, vomiting, loss of coordination, feeling unsteady; or  · severe nervous system reaction --very stiff (rigid) muscles, high fever, sweating, confusion, fast or uneven heartbeats, tremors, feeling like you might pass out. Common side effects may include:  · drowsiness, dizziness;  · vision changes;  · constipation; or  · dry mouth, altered sense of taste. This is not a complete list of side effects and others may occur. Call your doctor for medical advice about side effects. You may report side effects to FDA at 6-478-FDA-5022. What other drugs will affect trazodone? Taking this medicine with other drugs that make you sleepy can worsen this effect. Ask your doctor before taking trazodone with a sleeping pill, narcotic pain medicine, muscle relaxer, or medicine for anxiety, depression, or seizures. Many drugs can interact with trazodone. Not all possible interactions are listed here.  Tell your doctor about all your medications and any you start or stop using during treatment with trazodone, especially:  · any other antidepressant;  · anagrelide;  · droperidol;  · methadone;  · ondansetron;  · an antibiotic --azithromycin, clarithromycin, erythromycin, levofloxacin, moxifloxacin, pentamidine;  · cancer medicine --arsenic trioxide, vandetanib;  · anti-malaria medication --chloroquine, halofantrine;  · heart rhythm medicine --amiodarone, disopyramide, dofetilide, dronedarone, flecainide, ibutilide, quinidine, sotalol; or  · medicine to treat a psychiatric disorder --chlorpromazine, haloperidol, pimozide, thioridazine. This list is not complete and many other drugs can interact with trazodone. This includes prescription and over-the-counter medicines, vitamins, and herbal products. Give a list of all your medicines to any healthcare provider who treats you. Where can I get more information? Your pharmacist can provide more information about trazodone. Remember, keep this and all other medicines out of the reach of children, never share your medicines with others, and use this medication only for the indication prescribed. Every effort has been made to ensure that the information provided by [address] [place] [address]  is accurate, up-to-date, and complete, but no guarantee is made to that effect. Drug information contained herein may be time sensitive. [facility] information has been compiled for use by healthcare practitioners and consumers in the [place] and therefore [facility] does not warrant that uses outside of the [place] are appropriate, unless specifically indicated otherwise. [facility]'s drug information does not endorse drugs, diagnose patients or recommend therapy.  [facility]s drug information is an informational resource designed to assist licensed healthcare practitioners in caring for their patients and/or to serve consumers viewing this service as a supplement to, and not a substitute for, the expertise, skill, knowledge and judgment of healthcare practitioners. The absence of a warning for a given drug or drug combination in no way should be construed to indicate that the drug or drug combination is safe, effective or appropriate for any given patient. [facility] does not assume any responsibility for any aspect of healthcare administered with the aid of information [facility] provides. The information contained herein is not intended to cover all possible uses, directions, precautions, warnings, drug interactions, allergic reactions, or adverse effects. If you have questions about the drugs you are taking, check with your doctor, nurse or pharmacist.  Copyright 8486-8103 [address]: 9.03. Revision date: 9/25/2015. Care instructions adapted under license by [facility] ([facility]). If you have questions about a medical condition or this instruction, always ask your healthcare professional. [place] any warranty or liability for your use of this information.                     Follow Up Appointment: Adriana Vaughn [address] [place] [address] [phone]  On 5/6/2019  @ 11:30am for medication management

## 2019-05-15 LAB
EKG ATRIAL RATE: 70 BPM
EKG P AXIS: 35 DEGREES
EKG P-R INTERVAL: 130 MS
EKG Q-T INTERVAL: 390 MS
EKG QRS DURATION: 86 MS
EKG QTC CALCULATION (BAZETT): 421 MS
EKG R AXIS: 36 DEGREES
EKG T AXIS: 47 DEGREES
EKG VENTRICULAR RATE: 70 BPM

## 2020-09-22 ENCOUNTER — OFFICE VISIT (OUTPATIENT)
Dept: FAMILY MEDICINE CLINIC | Age: 50
End: 2020-09-22
Payer: COMMERCIAL

## 2020-09-22 VITALS
HEART RATE: 69 BPM | OXYGEN SATURATION: 98 % | WEIGHT: 264.6 LBS | DIASTOLIC BLOOD PRESSURE: 64 MMHG | BODY MASS INDEX: 42.71 KG/M2 | SYSTOLIC BLOOD PRESSURE: 128 MMHG

## 2020-09-22 PROBLEM — F31.9 BIPOLAR 1 DISORDER (HCC): Status: ACTIVE | Noted: 2020-09-22

## 2020-09-22 PROBLEM — Z90.710 H/O: HYSTERECTOMY: Status: ACTIVE | Noted: 2020-09-22

## 2020-09-22 PROCEDURE — 3017F COLORECTAL CA SCREEN DOC REV: CPT | Performed by: FAMILY MEDICINE

## 2020-09-22 PROCEDURE — 1036F TOBACCO NON-USER: CPT | Performed by: FAMILY MEDICINE

## 2020-09-22 PROCEDURE — 99203 OFFICE O/P NEW LOW 30 MIN: CPT | Performed by: FAMILY MEDICINE

## 2020-09-22 PROCEDURE — G8417 CALC BMI ABV UP PARAM F/U: HCPCS | Performed by: FAMILY MEDICINE

## 2020-09-22 PROCEDURE — G8427 DOCREV CUR MEDS BY ELIG CLIN: HCPCS | Performed by: FAMILY MEDICINE

## 2020-09-22 RX ORDER — ESCITALOPRAM OXALATE 10 MG/1
10 TABLET ORAL DAILY
COMMUNITY

## 2020-09-22 RX ORDER — METRONIDAZOLE 7.5 MG/G
GEL VAGINAL
Qty: 1 TUBE | Refills: 0 | Status: SHIPPED | OUTPATIENT
Start: 2020-09-22 | End: 2020-11-19

## 2020-09-22 RX ORDER — OLANZAPINE 5 MG/1
5 TABLET ORAL NIGHTLY
COMMUNITY
End: 2021-05-13

## 2020-09-22 ASSESSMENT — ENCOUNTER SYMPTOMS
DIARRHEA: 0
BLOOD IN STOOL: 0
COUGH: 1
SHORTNESS OF BREATH: 0
WHEEZING: 0
CONSTIPATION: 0
ABDOMINAL PAIN: 0
EYES NEGATIVE: 1
ALLERGIC/IMMUNOLOGIC NEGATIVE: 1

## 2020-09-22 NOTE — PATIENT INSTRUCTIONS
your affected leg on top and your head propped on a pillow. Keep your feet and knees together and your knees bent. 2. Raise your top knee, but keep your feet together. Do not let your hips roll back. Your legs should open up like a clamshell. 3. Hold for 6 seconds. 4. Slowly lower your knee back down. Rest for 10 seconds. 5. Repeat 8 to 12 times. Standing quadriceps stretch   1. If you are not steady on your feet, hold on to a chair, counter, or wall. You can also lie on your stomach or your side to do this exercise. 2. Bend the knee of the leg you want to stretch, and reach behind you to grab the front of your foot or ankle with the hand on the same side. For example, if you are stretching your right leg, use your right hand. 3. Keeping your knees next to each other, pull your foot toward your buttock until you feel a gentle stretch across the front of your hip and down the front of your thigh. Your knee should be pointed directly to the ground, and not out to the side. 4. Hold the stretch for 15 to 30 seconds. 5. Repeat 2 to 4 times. Piriformis stretch   1. Lie on your back with your legs straight. 2. Lift your affected leg and bend your knee. With your opposite hand, reach across your body, and then gently pull your knee toward your opposite shoulder. 3. Hold the stretch for 15 to 30 seconds. 4. Repeat 2 to 4 times. Double knee-to-chest   1. Lie on your back with your knees bent and your feet flat on the floor. You can put a small pillow under your head and neck if it is more comfortable. 2. Bring both knees to your chest.  3. Keep your lower back pressed to the floor. Hold for 15 to 30 seconds. 4. Relax, and lower your knees to the starting position. 5. Repeat 2 to 4 times. Follow-up care is a key part of your treatment and safety. Be sure to make and go to all appointments, and call your doctor if you are having problems.  It's also a good idea to know your test results and keep a list of the medicines you take. Where can you learn more? Go to [email or web address] and sign in to your [employer] account. Enter U074 in the [facility] box to learn more about \"Trochanteric Bursitis: Exercises. \"     If you do not have an account, please click on the \"Sign Up Now\" link. Current as of: March 2, 2020               Content Version: 12.5  © 2006-2020 Healthwise[employer] Incorporated. Care instructions adapted under license by [facility] ([facility]). If you have questions about a medical condition or this instruction, always ask your healthcare professional. [address] any warranty or liability for your use of this information.

## 2020-09-22 NOTE — PROGRESS NOTES
[facility]  [facility]  [address]  [place] [address]  Dept: [phone]    9/22/2020    CHIEF COMPLAINT    Chief Complaint   Patient presents with   [address]       HPI    Desirae Mabry is a 47 y.o. female who presents   Chief Complaint   Patient presents with   [address]   . HPI    Vitals:    09/22/20 1401   BP: 128/64   Pulse: 69   SpO2: 98%   Weight: 264 lb 9.6 oz (120 kg)       REVIEW OF SYSTEMS    Review of Systems   Constitutional: Negative for fatigue, fever and unexpected weight change. HENT: Negative. Eyes: Negative. Respiratory: Positive for cough (chronic). Negative for shortness of breath and wheezing. Cardiovascular: Negative for chest pain, palpitations and leg swelling. Gastrointestinal: Negative for abdominal pain, blood in stool, constipation and diarrhea. Occasional reflux   Endocrine: Negative. Genitourinary: Positive for vaginal discharge (diagnosed a year ago, has not resolved). Negative for frequency and urgency. Musculoskeletal: Positive for arthralgias (rt hip). Skin: Negative. Allergic/Immunologic: Negative. Neurological: Negative for dizziness and headaches. Hematological: Negative. Psychiatric/Behavioral: Positive for dysphoric mood (chronic bipolar, seeing provider at [address] [place] [address]). Negative for sleep disturbance. The patient is not nervous/anxious.         PAST MEDICAL HISTORY    Past Medical History:   Diagnosis Date    Bipolar 1 disorder ([facility] [address]     Depressed     H/O: hysterectomy 9/22/2020    Obesity        FAMILY HISTORY    Family History   Problem Relation Age of Onset    Arthritis Mother     Coronary Art Dis Father     Cancer Sister         rectal       SOCIAL HISTORY    Social History     Socioeconomic History    Marital status:      Spouse name: None    Number of children: 2    Years of education: None    Highest education level: None   Occupational History    None   Social Needs    Financial resource strain: None    Food insecurity     Worry: None     Inability: None    Transportation needs     Medical: None     Non-medical: None   Tobacco Use    Smoking status: Never Smoker    Smokeless tobacco: Never Used   Substance and Sexual Activity    Alcohol use: Never     Frequency: Never    Drug use: Not Currently    Sexual activity: None   Lifestyle    Physical activity     Days per week: None     Minutes per session: None    Stress: None   Relationships    Social connections     Talks on phone: None     Gets together: None     Attends Holiness service: None     Active member of club or organization: None     Attends meetings of clubs or organizations: None     Relationship status: None    Intimate partner violence     Fear of current or ex partner: None     Emotionally abused: None     Physically abused: None     Forced sexual activity: None   Other Topics Concern    None   Social History Narrative    None       SURGICAL HISTORY    Past Surgical History:   Procedure Laterality Date    APPENDECTOMY      CHOLECYSTECTOMY      HERNIA REPAIR      HYSTERECTOMY      for bleeding    LAPAROSCOPY         CURRENT MEDICATIONS    Current Outpatient Medications   Medication Sig Dispense Refill    escitalopram (LEXAPRO) 10 MG tablet Take 10 mg by mouth daily      OLANZapine (ZYPREXA) 5 MG tablet Take 5 mg by mouth nightly      metroNIDAZOLE (METROGEL VAGINAL) 0.75 % vaginal gel 1 applicator nightly for 5 nights 1 Tube 0    traZODone (DESYREL) 50 MG tablet Take 1 tablet by mouth nightly as needed for Sleep 14 tablet 0    vitamin B-6 (PYRIDOXINE) 50 MG tablet Take 50 mg by mouth daily      melatonin ER 3 MG TBCR Take 3 mg by mouth nightly (Patient not taking: Reported on 9/22/2020) 14 tablet 0     No current facility-administered medications for this visit.         ALLERGIES    Allergies   Allergen Reactions    Strawberry Extract        PHYSICAL EXAM Physical Exam  Vitals signs reviewed. Constitutional:       Appearance: She is well-developed. She is obese. HENT:      Head: Normocephalic. Eyes:      Pupils: Pupils are equal, round, and reactive to light. Neck:      Musculoskeletal: Normal range of motion and neck supple. Thyroid: No thyromegaly. Cardiovascular:      Rate and Rhythm: Normal rate and regular rhythm. Heart sounds: Normal heart sounds. No murmur. Pulmonary:      Effort: Pulmonary effort is normal.      Breath sounds: Normal breath sounds. No wheezing or rales. Abdominal:      Palpations: Abdomen is soft. Tenderness: There is no abdominal tenderness. There is no guarding or rebound. Musculoskeletal: Normal range of motion. General: No tenderness or deformity. Lymphadenopathy:      Cervical: No cervical adenopathy. Skin:     General: Skin is warm and dry. Neurological:      Mental Status: She is alert and oriented to person, place, and time. Psychiatric:         Mood and Affect: Mood normal.         Behavior: Behavior normal.         Thought Content: Thought content normal.         Judgment: Judgment normal.         Assessment/PLan  1. Encounter to establish care  Reviewed history with pt and records. 2. Pain of right hip joint  Pt ed for hip exercises    3. Bacterial vaginitis  If not resolved will need a culture. - metroNIDAZOLE (METROGEL VAGINAL) 0.75 % vaginal gel; 1 applicator nightly for 5 nights  Dispense: 1 Tube; Refill: 0    4. Encounter for screening mammogram for malignant neoplasm of breast    - [facility] DIGITAL SCREEN W OR WO CAD BILATERAL; Future    5. Class 3 severe obesity due to excess calories without serious comorbidity with body mass index (BMI) of 40.0 to 44.9 in adult [facility])  Cont efforts to exercise. 6. H/O: hysterectomy  No pap needed    7. Bipolar 1 disorder ([facility] [address]  Cont seeing mental health provider    8.  Chronic cough  Discussed possible reflux or pnd causing sx  - XR CHEST (2 VW); Future      Vy received counseling on the following healthy behaviors: nutrition, exercise and medication adherence  Reviewed prior labs and health maintenance. Continue current medications, diet and exercise. Discussed use, benefit, and side effects of prescribed medications. Barriers to medication compliance addressed. Patient given educational materials - see patient instructions. All patient questions answered. Patient voiced understanding. Return in about 6 months (around 3/22/2021), or if symptoms worsen or fail to improve.         Electronically signed by Miil Mejia MD on 9/22/20 at 2:13 PM EDT

## 2020-09-30 NOTE — GROUP NOTE
Group Therapy Note    Date: April 25    Group Start Time: 0900  Group End Time: 0920  Group Topic: [facility]        Group Therapy Note    Pt did not attend Therapeutic Recreation at 0900 d/t resting in room despite staff invitation to attend. N/A

## 2020-11-19 ENCOUNTER — HOSPITAL ENCOUNTER (EMERGENCY)
Facility: CLINIC | Age: 50
Discharge: HOME | End: 2020-11-19
Attending: EMERGENCY MEDICINE
Payer: COMMERCIAL

## 2020-11-19 VITALS
SYSTOLIC BLOOD PRESSURE: 155 MMHG | BODY MASS INDEX: 43.39 KG/M2 | RESPIRATION RATE: 20 BRPM | WEIGHT: 270 LBS | TEMPERATURE: 98.3 F | HEART RATE: 73 BPM | DIASTOLIC BLOOD PRESSURE: 87 MMHG | OXYGEN SATURATION: 95 % | HEIGHT: 66 IN

## 2020-11-19 PROCEDURE — 99284 EMERGENCY DEPT VISIT MOD MDM: CPT

## 2020-11-19 PROCEDURE — 90715 TDAP VACCINE 7 YRS/> IM: CPT | Performed by: EMERGENCY MEDICINE

## 2020-11-19 PROCEDURE — 90471 IMMUNIZATION ADMIN: CPT | Performed by: EMERGENCY MEDICINE

## 2020-11-19 PROCEDURE — 6360000002 HC RX W HCPCS: Performed by: EMERGENCY MEDICINE

## 2020-11-19 RX ORDER — GINSENG 100 MG
CAPSULE ORAL 3 TIMES DAILY
Status: DISCONTINUED | OUTPATIENT
Start: 2020-11-19 | End: 2020-11-19 | Stop reason: HOSPADM

## 2020-11-19 RX ADMIN — TETANUS TOXOID, REDUCED DIPHTHERIA TOXOID AND ACELLULAR PERTUSSIS VACCINE, ADSORBED 0.5 ML: 5; 2.5; 8; 8; 2.5 SUSPENSION INTRAMUSCULAR at 10:59

## 2020-11-19 NOTE — ED PROVIDER NOTES
[facility]  [address] [facility]  Phone: [phone]        Pt Name: Mera Altamirano  MRN: [id number]  [place] 1970  Date of evaluation: 11/19/20      CHIEF COMPLAINT     Chief Complaint   Patient presents with    Finger Injury     laceration         HISTORY OF PRESENT ILLNESS  (Location/Symptom, Timing/Onset, Context/Setting, Quality, Duration, Modifying Factors, Severity.)    Mera Altamirano is a 47 y.o. female who presents with a left index finger laceration. The patient states she was using a knife cutting an apple when she accidentally cut her left index finger bleeding is controlled with direct pressure last tetanus is unknown no numbness no weakness the incident occurred just prior to arrival nothing makes her symptoms better or worse      REVIEW OF SYSTEMS    (2-9 systems for level 4, 10 or more for level 5)     Review of Systems   Skin: Positive for wound. Neurological: Negative for weakness and numbness. PAST MEDICAL HISTORY    has a past medical history of Bipolar 1 disorder ([address] Depressed, H/O: hysterectomy, and Obesity. SURGICAL HISTORY      has a past surgical history that includes hernia repair; Cholecystectomy; Appendectomy; laparoscopy; and Hysterectomy. CURRENTMEDICATIONS       Previous Medications    ESCITALOPRAM (LEXAPRO) 10 MG TABLET    Take 10 mg by mouth daily    MELATONIN ER 3 MG TBCR    Take 3 mg by mouth nightly    OLANZAPINE (ZYPREXA) 5 MG TABLET    Take 5 mg by mouth nightly    TRAZODONE (DESYREL) 50 MG TABLET    Take 1 tablet by mouth nightly as needed for Sleep    VITAMIN B-6 (PYRIDOXINE) 50 MG TABLET    Take 50 mg by mouth daily       ALLERGIES     is allergic to strawberry extract. FAMILY HISTORY     She indicated that her mother is alive. She indicated that her father is alive. She indicated that both of her sisters are alive. She indicated that both of her daughters are alive.      family history includes Arthritis in her mother; Cancer in her sister; Coronary Art Dis in her father. SOCIAL HISTORY      reports that she has never smoked. She has never used smokeless tobacco. She reports previous drug use. She reports that she does not drink alcohol. PHYSICAL EXAM    (up to 7 for level 4, 8 or more for level 5)   INITIAL VITALS:  height is 5' 6\" (1.676 m) and weight is 122.5 kg (270 lb). Her oral temperature is 98.3 °F (36.8 °C). Her blood pressure is 155/87 (abnormal) and her pulse is 73. Her respiration is 20 and oxygen saturation is 95%. Physical Exam  Vitals signs and nursing note reviewed. Constitutional:       Appearance: Normal appearance. HENT:      Head: Normocephalic and atraumatic. Eyes:      Conjunctiva/sclera: Conjunctivae normal.   Neck:      Musculoskeletal: Normal range of motion and neck supple. Musculoskeletal: Normal range of motion. General: No swelling or tenderness. Skin:     Comments: Patient is noted to have a small avulsion to the distal aspect of the left index finger approximately 1 mm there is some bleeding from the area no other abnormalities no signs of infection   Neurological:      General: No focal deficit present. Mental Status: She is alert. DIFFERENTIAL DIAGNOSIS/ MDM:     We will update the patient's tetanus I did offer to cauterize the wound which the patient refuses we will go ahead and apply some bacitracin with a tube gauze I am recommending that the patient keep the area clean and dry may apply antibiotic ointment return to the ER for any signs of infection otherwise to follow-up with her family doctor within the next few days    DIAGNOSTIC RESULTS         LABS:  No results found for this visit on 11/19/20.         EMERGENCY DEPARTMENT COURSE:   Vitals:    Vitals:    11/19/20 1051   BP: (!) 155/87   Pulse: 73   Resp: 20   Temp: 98.3 °F (36.8 °C)   TempSrc: Oral   SpO2: 95%   Weight: 122.5 kg (270 lb)   Height: 5' 6\" (1.676 m) -------------------------  BP: (!) 155/87, Temp: 98.3 °F (36.8 °C), Pulse: 73, Resp: 20      RE-EVALUATION:  There are no signs of infection, there are no foreign bodies within the wound, there are no signs of compartment syndrome. The pulses are 2/4. The motor is 5/5. The sensation is intact. The patient has full range of motion. The patient was instructed to follow up in 2-3 days with their family doctor for a wound check. We also discussed returning to the Emergency Department immediately if new or worsening symptoms occur. We have discussed the symptoms which are most concerning (e.g., increasing pain, fever, drainage from the wound or other signs of infection, numbness, weakness, color change or coldness to the extremity) that necessitate immediate return. The patient was advised to keep the wound clean and dry, they may apply antibiotic ointment to the wound. The patient understands that at this time there is no evidence for a more malignant underlying process, but the patient also understands that early in the process of an illness or injury, an emergency department workup can be falsely reassuring. Routine discharge counseling was given, and the patient understands that worsening, changing or persistent symptoms should prompt an immediate call or follow up with their primary physician or return to the emergency department. The importance of appropriate follow up was also discussed. I have reviewed the disposition diagnosis with the patient and or their family/guardian. I have answered their questions and given discharge instructions. They voiced understanding of these instructions and did not have any further questions or complaints. PROCEDURES:  None    FINAL IMPRESSION      1.  Visit for wound check          DISPOSITION/PLAN   DISPOSITION Decision To Discharge 11/19/2020 10:57:49 AM      CONDITION ON DISPOSITION:   Stable    PATIENT REFERRED TO:  King Biswas MD  [address]  [facility] [id number] [place] [address]    Call in 2 days        DISCHARGE MEDICATIONS:  New Prescriptions    No medications on file       (Please note that portions of this note were completed with a voicerecognition program.  Efforts were made to edit the dictations but occasionally words are mis-transcribed.)    Isbell MD, F.A.C.E.P.   Attending Emergency Medicine Physician       Deepthi Ortiz MD  11/19/20 2122

## 2021-01-05 ENCOUNTER — OFFICE VISIT (OUTPATIENT)
Dept: FAMILY MEDICINE CLINIC | Age: 51
End: 2021-01-05
Payer: COMMERCIAL

## 2021-01-05 VITALS
RESPIRATION RATE: 17 BRPM | HEIGHT: 66 IN | OXYGEN SATURATION: 97 % | WEIGHT: 286 LBS | BODY MASS INDEX: 45.96 KG/M2 | DIASTOLIC BLOOD PRESSURE: 84 MMHG | TEMPERATURE: 97.1 F | SYSTOLIC BLOOD PRESSURE: 139 MMHG | HEART RATE: 86 BPM

## 2021-01-05 DIAGNOSIS — E66.01 CLASS 3 SEVERE OBESITY DUE TO EXCESS CALORIES WITHOUT SERIOUS COMORBIDITY WITH BODY MASS INDEX (BMI) OF 45.0 TO 49.9 IN ADULT (HCC): ICD-10-CM

## 2021-01-05 DIAGNOSIS — F31.9 BIPOLAR 1 DISORDER (HCC): ICD-10-CM

## 2021-01-05 DIAGNOSIS — N76.0 ACUTE VAGINITIS: Primary | ICD-10-CM

## 2021-01-05 DIAGNOSIS — Z90.710 H/O: HYSTERECTOMY: ICD-10-CM

## 2021-01-05 PROBLEM — F32.9 MAJOR DEPRESSION, SINGLE EPISODE: Status: ACTIVE | Noted: 2021-01-05

## 2021-01-05 PROBLEM — F41.1 GENERALIZED ANXIETY DISORDER: Status: ACTIVE | Noted: 2021-01-05

## 2021-01-05 PROBLEM — R10.2 PELVIC PAIN: Status: ACTIVE | Noted: 2017-06-19

## 2021-01-05 PROBLEM — I87.309 VENOUS HYPERTENSION: Status: ACTIVE | Noted: 2020-01-09

## 2021-01-05 PROBLEM — I83.813 VARICOSE VEINS OF BILATERAL LOWER EXTREMITIES WITH PAIN: Status: ACTIVE | Noted: 2020-01-09

## 2021-01-05 PROBLEM — Z12.11 COLON CANCER SCREENING: Status: ACTIVE | Noted: 2018-08-10

## 2021-01-05 PROBLEM — M21.611 BUNION, RIGHT: Status: ACTIVE | Noted: 2017-07-13

## 2021-01-05 PROCEDURE — 1036F TOBACCO NON-USER: CPT | Performed by: FAMILY MEDICINE

## 2021-01-05 PROCEDURE — 99214 OFFICE O/P EST MOD 30 MIN: CPT | Performed by: FAMILY MEDICINE

## 2021-01-05 PROCEDURE — G8417 CALC BMI ABV UP PARAM F/U: HCPCS | Performed by: FAMILY MEDICINE

## 2021-01-05 PROCEDURE — 3017F COLORECTAL CA SCREEN DOC REV: CPT | Performed by: FAMILY MEDICINE

## 2021-01-05 PROCEDURE — G8427 DOCREV CUR MEDS BY ELIG CLIN: HCPCS | Performed by: FAMILY MEDICINE

## 2021-01-05 PROCEDURE — G8482 FLU IMMUNIZE ORDER/ADMIN: HCPCS | Performed by: FAMILY MEDICINE

## 2021-01-05 RX ORDER — ERGOCALCIFEROL 1.25 MG/1
1 CAPSULE ORAL
COMMUNITY

## 2021-01-05 RX ORDER — MAGNESIUM OXIDE 400 MG/1
400 TABLET ORAL NIGHTLY
COMMUNITY
End: 2021-01-05 | Stop reason: ALTCHOICE

## 2021-01-05 RX ORDER — FLUCONAZOLE 150 MG/1
150 TABLET ORAL
Qty: 2 TABLET | Refills: 1 | Status: SHIPPED | OUTPATIENT
Start: 2021-01-05 | End: 2021-05-13 | Stop reason: ALTCHOICE

## 2021-01-05 SDOH — ECONOMIC STABILITY: FOOD INSECURITY: WITHIN THE PAST 12 MONTHS, YOU WORRIED THAT YOUR FOOD WOULD RUN OUT BEFORE YOU GOT MONEY TO BUY MORE.: NEVER TRUE

## 2021-01-05 SDOH — ECONOMIC STABILITY: TRANSPORTATION INSECURITY
IN THE PAST 12 MONTHS, HAS THE LACK OF TRANSPORTATION KEPT YOU FROM MEDICAL APPOINTMENTS OR FROM GETTING MEDICATIONS?: NO

## 2021-01-05 SDOH — ECONOMIC STABILITY: TRANSPORTATION INSECURITY
IN THE PAST 12 MONTHS, HAS LACK OF TRANSPORTATION KEPT YOU FROM MEETINGS, WORK, OR FROM GETTING THINGS NEEDED FOR DAILY LIVING?: NO

## 2021-01-05 ASSESSMENT — ENCOUNTER SYMPTOMS
ABDOMINAL PAIN: 0
CONSTIPATION: 0
ALLERGIC/IMMUNOLOGIC NEGATIVE: 1
DIARRHEA: 0
EYES NEGATIVE: 1
COUGH: 0
SHORTNESS OF BREATH: 0
BLOOD IN STOOL: 0

## 2021-01-05 NOTE — PATIENT INSTRUCTIONS
Patient Education        liraglutide  Pronunciation:  LIR a GLOO tide  Brand:  Javedkamilah Cornejo  What is the most important information I should know about liraglutide? Do not use Saxenda and Victoza together. You should not use liraglutide if you have multiple endocrine neoplasia type 2 (tumors in your glands), a personal or family history of medullary thyroid cancer. In animal studies, liraglutide caused thyroid tumors or thyroid cancer. It is not known whether these effects would occur in people using regular doses. Call your doctor at once if you have signs of a thyroid tumor, such as swelling or a lump in your neck, trouble swallowing, a hoarse voice, or shortness of breath. What is liraglutide? The Victoza brand of liraglutide is used together with diet and exercise to improve blood sugar control in adults and children at least 8years old who have type 2 diabetes mellitus. Victoza  is also used to help reduce the risk of serious heart problems such as heart attack or stroke in adults who have type 2 diabetes and heart disease. Victoza is not for treating type 1 diabetes. The Saxenda brand of liraglutide is used together with diet and exercise to help people lose weight when they have certain health conditions. Almeta Charu is not for treating type 1 or type 2 diabetes. Almeta Charu is not a weight-loss medicine or appetite suppressant. Liraglutide may also be used for purposes not listed in this medication guide. What should I discuss with my health care provider before using liraglutide? You should not use liraglutide if you are allergic to it, or if you have:  · multiple endocrine neoplasia type 2 (tumors in your glands); or  · a personal or family history of medullary thyroid carcinoma (a type of thyroid cancer). You should not use Saxenda if you also use other medicines like liraglutide (albiglutide, dulaglutide, exenatide, Byetta, Bydureon, Tanzeum, Trulicity). Tell your doctor if you have ever had:  · stomach problems causing slow digestion;  · kidney or liver disease;  · high triglycerides (a type of fat in the blood);  · heart problems;  · problems with your pancreas or gallbladder; or  · (if you use Saxenda) depression or suicidal thoughts. In animal studies, liraglutide caused thyroid tumors or thyroid cancer. It is not known whether these effects would occur in people using regular doses. Ask your doctor about your risk. Do not use Saxenda if you are pregnant. Weight loss is not recommended during pregnancy, even if you are overweight. Stop using Saxenda and tell your doctor right away if you become pregnant. Follow your doctor's instructions about using Victoza if you are pregnant or you become pregnant. Controlling diabetes is very important during pregnancy, and having high blood sugar may cause complications in both the mother and the baby. It may not be safe to breastfeed while using liraglutide. Ask your doctor about any risk. How should I use liraglutide? Follow all directions on your prescription label and read all medication guides or instruction sheets. Your doctor may occasionally change your dose. Use the medicine exactly as directed. Do not use Saxenda and Victoza together. Liraglutide is injected under the skin at any time of the day, with or without a meal. A healthcare provider may teach you how to properly use the medication by yourself. Read and carefully follow any Instructions for Use provided with your medicine. Ask your doctor or pharmacist if you don't understand all instructions. Prepare an injection only when you are ready to give it. Do not use if the medicine has changed colors or has particles in it. Call your pharmacist for new medicine. Call your doctor if you are sick with vomiting or diarrhea. You can easily become dehydrated while using liraglutide. This can lead to kidney failure. You may have low blood sugar (hypoglycemia) and feel very hungry, dizzy, irritable, confused, anxious, or shaky. To quickly treat hypoglycemia, eat or drink a fast-acting source of sugar (fruit juice, hard candy, crackers, raisins, or non-diet soda). Your doctor may prescribe a glucagon injection kit in case you have severe hypoglycemia. Be sure your family or close friends know how to give you this injection in an emergency. Also watch for signs of high blood sugar (hyperglycemia) such as increased thirst or urination. Blood sugar levels can be affected by stress, illness, surgery, exercise, alcohol use, or skipping meals. Ask your doctor before changing your dose or medication schedule. Storing unopened injection pens: Refrigerate and use until the expiration date. Storing after your first use: Store the pen in a refrigerator or at room temperature and use within 30 days. Do not freeze liraglutide, and throw away the medicine if it has become frozen. Use a needle only once and then place it in a puncture-proof \"sharps\" container. Follow state or local laws about how to dispose of this container. Keep it out of the reach of children and pets. What happens if I miss a dose? Skip the missed dose and use the next regularly scheduled dose. Do not use two doses at one time. Call your doctor for instructions if you miss 3 or more doses of Saxenda. What happens if I overdose? Seek emergency medical attention or call the Poison Help line at 1-562.996.1003. What should I avoid while using liraglutide? Never share an injection pen, cartridge, or syringe with another person, even if the needle has been changed. Sharing these devices can allow infections or disease to pass from one person to another. What are the possible side effects of liraglutide? Get emergency medical help if you have signs of an allergic reaction:  hives; fast heartbeats; dizziness; trouble breathing or swallowing; swelling of your face, lips, tongue, or throat. Call your doctor at once if you have:  · racing or pounding heartbeats;  · sudden changes in mood or behavior, suicidal thoughts;  · dehydration symptoms --feeling very thirsty or hot, being unable to urinate, heavy sweating, or hot and dry skin;  · low blood sugar --headache, hunger, sweating, irritability, dizziness, fast heart rate, and feeling anxious or shaky;  · gallbladder or pancreas problems --sudden and severe pain in your upper stomach that may spread to your back, nausea, vomiting, fever, jaundice (yellowing of your skin or eyes); or  · signs of a thyroid tumor --swelling or a lump in your neck, trouble swallowing, a hoarse voice, feeling short of breath. Common side effects may include:  · low blood sugar;  · nausea, vomiting, stomach discomfort, loss of appetite;  · diarrhea, constipation;  · rash;  · headache, dizziness; or  · feeling tired. This is not a complete list of side effects and others may occur. Call your doctor for medical advice about side effects. You may report side effects to FDA at 1-750-FDA-8909. What other drugs will affect liraglutide? Liraglutide can slow your digestion, and it may take longer for your body to absorb any medicines you take by mouth. Tell your doctor if you also use insulin or oral diabetes medicine. Other drugs may affect liraglutide, including prescription and over-the-counter medicines, vitamins, and herbal products. Tell your doctor about all your current medicines and any medicine you start or stop using. Where can I get more information? Your pharmacist can provide more information about liraglutide. Remember, keep this and all other medicines out of the reach of children, never share your medicines with others, and use this medication only for the indication prescribed. Every effort has been made to ensure that the information provided by [address] is accurate, up-to-date, and complete, but no guarantee is made to that effect. Drug information contained herein may be time sensitive. [facility] information has been compiled for use by healthcare practitioners and consumers in the [facility] and therefore [facility] does not warrant that uses outside of the [facility] are appropriate, unless specifically indicated otherwise. [facility]'s drug information does not endorse drugs, diagnose patients or recommend therapy. [facility]'s drug information is an informational resource designed to assist licensed healthcare practitioners in caring for their patients and/or to serve consumers viewing this service as a supplement to, and not a substitute for, the expertise, skill, knowledge and judgment of healthcare practitioners. The absence of a warning for a given drug or drug combination in no way should be construed to indicate that the drug or drug combination is safe, effective or appropriate for any given patient. [facility] does not assume any responsibility for any aspect of healthcare administered with the aid of information [facility] provides. The information contained herein is not intended to cover all possible uses, directions, precautions, warnings, drug interactions, allergic reactions, or adverse effects. If you have questions about the drugs you are taking, check with your doctor, nurse or pharmacist.  Copyright 9216-9020 [address] [place] [address]. Version: 10.01. Revision date: 4/9/2020. Care instructions adapted under license by [facility] If you have questions about a medical condition or this instruction, always ask your healthcare professional. [address] any warranty or liability for your use of this information.

## 2021-01-05 NOTE — PROGRESS NOTES
[facility]  [facility]  [address]  [address] [address]  Dept: [phone]    1/5/2021    CHIEF COMPLAINT    Chief Complaint   Patient presents with    Vaginitis    Weight Gain       HPI    Alysha Loja is a 45 y.o. female who presents   Chief Complaint   Patient presents with    Vaginitis    Weight Gain   . Recurrent vaginal redness, occasional discharge. Used metrogel without improvement. Denies itching. Does have some urine leakage. History of hysterectomy in 2017 for benign reasons. Still has ovaries intact and currently is having some symptoms of menopause including hot flashes. Concerned with weight gain. Has been as low as 187 pounds 2 years ago after being ill and not eating normally. Has been to [employer], tried calorie counting without success. She is walking daily and active in the home but admits she could be more active with exercise. She has struggled with her weight since being a teenager. Currently seeing a psychiatrist for bipolar who has started her on Zyprexa. This does have an side effect of weight gain. They did discuss trying Topamax for weight loss but decided not to due to potential side effects. She is taking melatonin and trazodone at night which is fairly effective but she still has nights where she does not sleep well. Vitals:    01/05/21 0816   BP: 139/84   Site: Left Upper Arm   Position: Sitting   Cuff Size: Large Adult   Pulse: 86   Resp: 17   Temp: 97.1 °F (36.2 °C)   TempSrc: Temporal   SpO2: 97%   Weight: 286 lb (129.7 kg)   Height: 5' 6.4\" (1.687 m)       REVIEW OF SYSTEMS    Review of Systems   Constitutional: Positive for unexpected weight change. Negative for fatigue and fever. HENT: Negative. Eyes: Negative. Respiratory: Negative for cough and shortness of breath. Cardiovascular: Negative for chest pain and leg swelling.    Gastrointestinal: Negative for abdominal pain, blood in stool, constipation and diarrhea. Endocrine: Negative. Genitourinary: Positive for frequency. Negative for urgency. See HPI   Musculoskeletal: Negative. Skin: Negative. Allergic/Immunologic: Negative. Neurological: Negative for dizziness and headaches. Hematological: Negative. Psychiatric/Behavioral: Positive for dysphoric mood and sleep disturbance. The patient is not nervous/anxious.          Mostly stable on medications per psychiatrist       PAST MEDICAL HISTORY    Past Medical History:   Diagnosis Date    [facility]     Depressed     H/O: hysterectomy 9/22/2020    Obesity        FAMILY HISTORY    Family History   Problem Relation Age of Onset    Arthritis Mother     Coronary Art Dis Father     Cancer Sister         rectal       SOCIAL HISTORY    Social History     Socioeconomic History    Marital status:      Spouse name: None    Number of children: 2    Years of education: None    Highest education level: None   Occupational History    None   Social Needs    Financial resource strain: Not hard at all   [place] insecurity     Worry: Never true     Inability: Never true    Transportation needs     Medical: No     Non-medical: No   Tobacco Use    Smoking status: Never Smoker    Smokeless tobacco: Never Used   Substance and Sexual Activity    Alcohol use: Never     Frequency: Never    Drug use: Not Currently    Sexual activity: None   Lifestyle    Physical activity     Days per week: None     Minutes per session: None    Stress: None   Relationships    Social connections     Talks on phone: None     Gets together: None     Attends [employer] service: None     Active member of club or organization: None     Attends meetings of clubs or organizations: None     Relationship status: None    Intimate partner violence     Fear of current or ex partner: None     Emotionally abused: None     Physically abused: None     Forced sexual activity: None   Other Normal range of motion. General: No tenderness or deformity. Lymphadenopathy:      Cervical: No cervical adenopathy. Skin:     General: Skin is warm and dry. Neurological:      Mental Status: She is alert and oriented to person, place, and time. Psychiatric:         Mood and Affect: Mood normal.         Behavior: Behavior normal.         Thought Content: Thought content normal.         Judgment: Judgment normal.      Comments: Poor eye contact. ASSESSMENT/PLAN  1. Acute vaginitis  Recurrent. Not improved with MetroGel treatment. Trial of oral and topical as prescribed. If not resolved may need to see GYN or have pelvic exam here. Discussed trying to wear a panty liner for urine leakage to keep the moisture at a minimum.  - fluconazole (DIFLUCAN) 150 MG tablet; Take 1 tablet by mouth every 72 hours as needed (vaginitis)  Dispense: 2 tablet; Refill: 1  - nystatin-triamcinolone (MYCOLOG II) 989991-0.7 UNIT/GM-% cream; Apply topically 2 times daily. Dispense: 60 g; Refill: 1    2. Class 3 severe obesity due to excess calories without serious comorbidity with body mass index (BMI) of 45.0 to 49.9 in adult [facility])  Discussed options of some medications. Given patient education on [facility] [address] [place] [address]. She will consult with [facility] weight management and try to increase exercise. Reviewed recent labs done in December which were normal including blood sugar, and thyroid and cholesterol.  - [employer] [facility], [place]    3. H/O: hysterectomy  Hysterectomy for benign reasons. Review of prior pathology is benign. 4. Bipolar 1 disorder ([facility]  Continue medications with psychiatrist.  Encouraged increase in exercise. Reji Issa received counseling on the following healthy behaviors: nutrition, exercise and medication adherence  Reviewed prior labs and health maintenance. Continue current medications, diet and exercise. Discussed use, benefit, and side effects of prescribed medications. Barriers to medication compliance addressed. Patient given educational materials - see patient instructions. All patient questions answered. Patient voiced understanding. Return in about 4 months (around 5/5/2021) for weight check.         Electronically signed by Leroy Lombardo MD on 1/5/21 at 8:27 AM EST

## 2021-02-04 PROBLEM — Z12.11 COLON CANCER SCREENING: Status: RESOLVED | Noted: 2018-08-10 | Resolved: 2021-02-04

## 2021-03-29 ENCOUNTER — OFFICE VISIT (OUTPATIENT)
Dept: FAMILY MEDICINE CLINIC | Age: 51
End: 2021-03-29
Payer: COMMERCIAL

## 2021-03-29 ENCOUNTER — NURSE TRIAGE (OUTPATIENT)
Dept: OTHER | Facility: CLINIC | Age: 51
End: 2021-03-29

## 2021-03-29 VITALS
BODY MASS INDEX: 44.65 KG/M2 | WEIGHT: 280 LBS | TEMPERATURE: 97.1 F | DIASTOLIC BLOOD PRESSURE: 88 MMHG | HEART RATE: 97 BPM | SYSTOLIC BLOOD PRESSURE: 132 MMHG

## 2021-03-29 DIAGNOSIS — R00.2 HEART PALPITATIONS: ICD-10-CM

## 2021-03-29 DIAGNOSIS — R07.89 OTHER CHEST PAIN: Primary | ICD-10-CM

## 2021-03-29 PROCEDURE — 3017F COLORECTAL CA SCREEN DOC REV: CPT | Performed by: NURSE PRACTITIONER

## 2021-03-29 PROCEDURE — G8417 CALC BMI ABV UP PARAM F/U: HCPCS | Performed by: NURSE PRACTITIONER

## 2021-03-29 PROCEDURE — 99214 OFFICE O/P EST MOD 30 MIN: CPT | Performed by: NURSE PRACTITIONER

## 2021-03-29 PROCEDURE — 1036F TOBACCO NON-USER: CPT | Performed by: NURSE PRACTITIONER

## 2021-03-29 PROCEDURE — 93000 ELECTROCARDIOGRAM COMPLETE: CPT | Performed by: NURSE PRACTITIONER

## 2021-03-29 PROCEDURE — G8427 DOCREV CUR MEDS BY ELIG CLIN: HCPCS | Performed by: NURSE PRACTITIONER

## 2021-03-29 PROCEDURE — G8482 FLU IMMUNIZE ORDER/ADMIN: HCPCS | Performed by: NURSE PRACTITIONER

## 2021-03-29 ASSESSMENT — ENCOUNTER SYMPTOMS
ABDOMINAL PAIN: 0
GASTROINTESTINAL NEGATIVE: 1
CONSTIPATION: 0
NAUSEA: 0
SHORTNESS OF BREATH: 0
RESPIRATORY NEGATIVE: 1
COUGH: 0
DIARRHEA: 0
VOMITING: 0
EYES NEGATIVE: 1

## 2021-03-29 NOTE — PROGRESS NOTES
[facility]  [facility]  [address]  [address] [address]  Dept: [phone]    3/29/2021    CHIEF COMPLAINT    Chief Complaint   Patient presents with    Chest Pain     patient states she has been having chest pains, started when she was 28 and went away. This weekend it started back with pain and palpitations. Described pain as sharp pain midline       HPI    Rafi Morrison is a 45 y.o. female who presents   Chief Complaint   Patient presents with    Chest Pain     patient states she has been having chest pains, started when she was 28 and went away. This weekend it started back with pain and palpitations. Described pain as sharp pain midline   . Appointment to discuss chest pains. Chest pain- Patient reports that she had similar pains at the age of 33 and they have recurred this weekend. She notes that she has also been experiencing heart palpitations. She notes \"they weren't really worrisome, they were here, there and gone\". She notes that the pain was sharp when it occurred and along her sternum. She denies having any work-up with previous CP in her 18's. She notes that she did not tell anyone at the time. Denies pain at this moment. The pain lasted roughly 5 minutes and her heart felt as if it was skipping beats. She denies SOB, dizziness or pain radiation. She notes dizziness from time to time outside of her CP that she attributes to her psychiatric medications. Bipolar- taking medications as prescribed. She reports she has been more consistent with taking her medications than in the past.   She reports medications are effective. She is seeing a psychiatrist at [facility] on [address]. Chest Pain   This is a new problem. The current episode started yesterday. The onset quality is sudden. The problem has been unchanged. The pain is present in the substernal region. The quality of the pain is described as sharp.  The pain  Food insecurity     Worry: Never true     Inability: Never true    Transportation needs     Medical: No     Non-medical: No   Tobacco Use    Smoking status: Never Smoker    Smokeless tobacco: Never Used   Substance and Sexual Activity    Alcohol use: Never     Frequency: Never    Drug use: Not Currently    Sexual activity: None   Lifestyle    Physical activity     Days per week: None     Minutes per session: None    Stress: None   Relationships    Social connections     Talks on phone: None     Gets together: None     Attends [employer] service: None     Active member of club or organization: None     Attends meetings of clubs or organizations: None     Relationship status: None    Intimate partner violence     Fear of current or ex partner: None     Emotionally abused: None     Physically abused: None     Forced sexual activity: None   Other Topics Concern    None   Social History Narrative    None       SURGICAL HISTORY    Past Surgical History:   Procedure Laterality Date    APPENDECTOMY      CHOLECYSTECTOMY      HERNIA REPAIR      HYSTERECTOMY      for bleeding    LAPAROSCOPY         CURRENT MEDICATIONS    Current Outpatient Medications   Medication Sig Dispense Refill    vitamin D (ERGOCALCIFEROL) 1.25 MG (04868 UT) CAPS capsule Take 1 capsule by mouth Twice a week      escitalopram (LEXAPRO) 10 MG tablet Take 10 mg by mouth daily      OLANZapine (ZYPREXA) 5 MG tablet Take 5 mg by mouth nightly      traZODone (DESYREL) 50 MG tablet Take 1 tablet by mouth nightly as needed for Sleep 14 tablet 0    melatonin ER 3 MG TBCR Take 3 mg by mouth nightly 14 tablet 0    vitamin B-6 (PYRIDOXINE) 50 MG tablet Take 50 mg by mouth daily      fluconazole (DIFLUCAN) 150 MG tablet Take 1 tablet by mouth every 72 hours as needed (vaginitis) (Patient not taking: Reported on 3/29/2021) 2 tablet 1    nystatin-triamcinolone (MYCOLOG II) 709412-1.1 UNIT/GM-% cream Apply topically 2 times daily.  (Patient not taking: Reported on 3/29/2021) 60 g 1     No current facility-administered medications for this visit. ALLERGIES    Allergies   Allergen Reactions    Strawberry Extract        PHYSICAL EXAM   Physical Exam  Vitals signs and nursing note reviewed. Constitutional:       General: She is not in acute distress. Appearance: She is well-developed. She is obese. She is not diaphoretic. Interventions: Face mask in place. HENT:      Head: Normocephalic. Right Ear: Hearing normal.      Left Ear: Hearing normal.   Eyes:      General:         Right eye: No discharge. Left eye: No discharge. Pupils: Pupils are equal.   Neck:      Musculoskeletal: Normal range of motion. Cardiovascular:      Rate and Rhythm: Normal rate and regular rhythm. Pulses: Normal pulses. Radial pulses are 2+ on the right side and 2+ on the left side. Heart sounds: Normal heart sounds, S1 normal and S2 normal. No murmur. Pulmonary:      Effort: Pulmonary effort is normal. No respiratory distress. Breath sounds: Normal breath sounds. No wheezing. Skin:     General: Skin is warm and dry. Neurological:      Mental Status: She is alert and oriented to person, place, and time. Psychiatric:         Behavior: Behavior normal. Behavior is cooperative. Comments: Poor eye contact          ASSESSMENT/PLAN  1. Other chest pain  -     EKG 12 Lead; Future  -     Comprehensive Metabolic Panel; Future  -     CBC Auto Differential; Future  -     T4, Free; Future  -     TSH without Reflex; Future  -     Iron; Future  -     Ferritin; Future  -     Magnesium; Future  2. Heart palpitations  -     EKG 12 Lead; Future  -     Comprehensive Metabolic Panel; Future  -     CBC Auto Differential; Future  -     T4, Free; Future  -     TSH without Reflex; Future  -     Iron; Future  -     Ferritin; Future  -     Magnesium;  Future       Vy received counseling on the following healthy behaviors: nutrition, exercise and medication adherence  Reviewed prior labs and health maintenance. Continue current medications, diet and exercise. Discussed use, benefit, and side effects of prescribed medications. Barriers to medication compliance addressed. Patient given educational materials - see patient instructions. All patient questions answered. Patient voiced understanding. Return in about 2 months (around 5/29/2021) for CP.     (Please note that portions of this note were completed with a voice recognition program. Efforts were made to edit the dictations but occasionally words are mis-transcribed.)      Electronically signed by RAFAELA Townsend CNP on 3/29/21 at 1:51 PM EDT

## 2021-03-29 NOTE — TELEPHONE ENCOUNTER
Reason for Disposition   Patient wants to be seen    Answer Assessment - Initial Assessment Questions  1. LOCATION: \"Where does it hurt? \"        Pressure in chest over weekend    2. RADIATION: \"Does the pain go anywhere else? \" (e.g., into neck, jaw, arms, back)      No    3. ONSET: \"When did the chest pain begin? \" (Minutes, hours or days)       Over the weekend    4. PATTERN \"Does the pain come and go, or has it been constant since it started? \"  \"Does it get worse with exertion? \"       Comes and goes    5. DURATION: \"How long does it last\" (e.g., seconds, minutes, hours)      Less than a min    6. SEVERITY: \"How bad is the pain? \"  (e.g., Scale 1-10; mild, moderate, or severe)     - MILD (1-3): doesn't interfere with normal activities      - MODERATE (4-7): interferes with normal activities or awakens from sleep     - SEVERE (8-10): excruciating pain, unable to do any normal activities       4/10    7. CARDIAC RISK FACTORS: \"Do you have any history of heart problems or risk factors for heart disease? \" (e.g., angina, prior heart attack; diabetes, high blood pressure, high cholesterol, smoker, or strong family history of heart disease)      Obesity    8. PULMONARY RISK FACTORS: \"Do you have any history of lung disease? \"  (e.g., blood clots in lung, asthma, emphysema, birth control pills)      No    9. CAUSE: \"What do you think is causing the chest pain? \"      Unknown    10. OTHER SYMPTOMS: \"Do you have any other symptoms? \" (e.g., dizziness, nausea, vomiting, sweating, fever, difficulty breathing, cough)        Denies    11. PREGNANCY: \"Is there any chance you are pregnant? \" \"When was your last menstrual period? \"        N/a    Protocols used: CHEST PAIN-ADULT-[place]    Received call from Evelyne Keita at [facility] with [employer].     Brief description of triage: chest pain over weekend, and today less than a minute    Triage indicates for patient to be seen in office today    Care advice provided, patient verbalizes understanding; denies any other questions or concerns; instructed to call back for any new or worsening symptoms. Writer provided warm transfer to David Maurer at [facility] for appointment scheduling. Attention Provider: Thank you for allowing me to participate in the care of your patient. The patient was connected to triage in response to information provided to the ECC. Please do not respond through this encounter as the response is not directed to a shared pool.

## 2021-04-12 LAB
ALBUMIN SERPL-MCNC: NORMAL G/DL
ALP BLD-CCNC: NORMAL U/L
ALT SERPL-CCNC: NORMAL U/L
ANION GAP SERPL CALCULATED.3IONS-SCNC: NORMAL MMOL/L
AST SERPL-CCNC: NORMAL U/L
BASOPHILS ABSOLUTE: NORMAL
BASOPHILS RELATIVE PERCENT: NORMAL
BILIRUB SERPL-MCNC: NORMAL MG/DL
BUN BLDV-MCNC: NORMAL MG/DL
CALCIUM SERPL-MCNC: NORMAL MG/DL
CHLORIDE BLD-SCNC: NORMAL MMOL/L
CO2: NORMAL
CREAT SERPL-MCNC: NORMAL MG/DL
EOSINOPHILS ABSOLUTE: NORMAL
EOSINOPHILS RELATIVE PERCENT: NORMAL
FERRITIN: NORMAL
GFR CALCULATED: NORMAL
GLUCOSE BLD-MCNC: NORMAL MG/DL
HCT VFR BLD CALC: NORMAL %
HEMOGLOBIN: NORMAL
IRON: NORMAL
LYMPHOCYTES ABSOLUTE: NORMAL
LYMPHOCYTES RELATIVE PERCENT: NORMAL
MAGNESIUM: NORMAL
MCH RBC QN AUTO: NORMAL PG
MCHC RBC AUTO-ENTMCNC: NORMAL G/DL
MCV RBC AUTO: NORMAL FL
MONOCYTES ABSOLUTE: NORMAL
MONOCYTES RELATIVE PERCENT: NORMAL
NEUTROPHILS ABSOLUTE: NORMAL
NEUTROPHILS RELATIVE PERCENT: NORMAL
PDW BLD-RTO: NORMAL %
PLATELET # BLD: NORMAL 10*3/UL
PMV BLD AUTO: NORMAL FL
POTASSIUM SERPL-SCNC: NORMAL MMOL/L
RBC # BLD: NORMAL 10*6/UL
SODIUM BLD-SCNC: NORMAL MMOL/L
T4 FREE: NORMAL
TOTAL PROTEIN: NORMAL
TSH SERPL DL<=0.05 MIU/L-ACNC: NORMAL M[IU]/L
WBC # BLD: NORMAL 10*3/UL

## 2021-04-16 DIAGNOSIS — R07.89 OTHER CHEST PAIN: ICD-10-CM

## 2021-04-16 DIAGNOSIS — R00.2 HEART PALPITATIONS: ICD-10-CM

## 2021-05-13 ENCOUNTER — OFFICE VISIT (OUTPATIENT)
Dept: FAMILY MEDICINE CLINIC | Age: 51
End: 2021-05-13
Payer: COMMERCIAL

## 2021-05-13 VITALS
DIASTOLIC BLOOD PRESSURE: 82 MMHG | HEIGHT: 66 IN | HEART RATE: 83 BPM | TEMPERATURE: 97.3 F | SYSTOLIC BLOOD PRESSURE: 130 MMHG | WEIGHT: 280.4 LBS | BODY MASS INDEX: 45.06 KG/M2 | OXYGEN SATURATION: 97 % | RESPIRATION RATE: 16 BRPM

## 2021-05-13 DIAGNOSIS — R00.2 HEART PALPITATIONS: Primary | ICD-10-CM

## 2021-05-13 DIAGNOSIS — F31.9 BIPOLAR 1 DISORDER (HCC): ICD-10-CM

## 2021-05-13 DIAGNOSIS — E66.01 CLASS 3 SEVERE OBESITY DUE TO EXCESS CALORIES WITHOUT SERIOUS COMORBIDITY WITH BODY MASS INDEX (BMI) OF 40.0 TO 44.9 IN ADULT (HCC): ICD-10-CM

## 2021-05-13 DIAGNOSIS — B35.6 DERMATOPHYTOSIS OF GROIN: ICD-10-CM

## 2021-05-13 PROCEDURE — 3017F COLORECTAL CA SCREEN DOC REV: CPT | Performed by: FAMILY MEDICINE

## 2021-05-13 PROCEDURE — 99214 OFFICE O/P EST MOD 30 MIN: CPT | Performed by: FAMILY MEDICINE

## 2021-05-13 PROCEDURE — G8417 CALC BMI ABV UP PARAM F/U: HCPCS | Performed by: FAMILY MEDICINE

## 2021-05-13 PROCEDURE — G8427 DOCREV CUR MEDS BY ELIG CLIN: HCPCS | Performed by: FAMILY MEDICINE

## 2021-05-13 PROCEDURE — 1036F TOBACCO NON-USER: CPT | Performed by: FAMILY MEDICINE

## 2021-05-13 RX ORDER — OLANZAPINE 15 MG/1
1 TABLET ORAL DAILY
COMMUNITY
Start: 2021-05-10

## 2021-05-13 ASSESSMENT — ENCOUNTER SYMPTOMS
ALLERGIC/IMMUNOLOGIC NEGATIVE: 1
EYES NEGATIVE: 1
BLOOD IN STOOL: 0
ABDOMINAL PAIN: 0
DIARRHEA: 0
SHORTNESS OF BREATH: 0
COUGH: 0
CONSTIPATION: 0

## 2021-05-13 ASSESSMENT — PATIENT HEALTH QUESTIONNAIRE - PHQ9
2. FEELING DOWN, DEPRESSED OR HOPELESS: 0
SUM OF ALL RESPONSES TO PHQ QUESTIONS 1-9: 0
1. LITTLE INTEREST OR PLEASURE IN DOING THINGS: 0
SUM OF ALL RESPONSES TO PHQ QUESTIONS 1-9: 0

## 2021-05-13 NOTE — PROGRESS NOTES
[facility]  [facility]  [address]  [facility] [address]  Dept: [phone]    5/13/2021    CHIEF COMPLAINT    Chief Complaint   Patient presents with    Palpitations     betrter    Weight Gain       SERGIO Pearl is a 45 y.o. female who presents   Chief Complaint   Patient presents with    Palpitations     betrter    Weight Gain   . Recheck palpitations. Has symptoms every few days lasting a few seconds. Feels like her heart is skipping a beat. She has had prior EKGs that were normal.  She feels a tightness in her chest when she is having the palpitations which last only a few seconds. She drinks 1 cup of coffee per day and has not noticed if it correlates with her symptoms of palpitations. Has gained weight over the past few months. Starting to get more active outside as the weather improves. Had labs done earlier this year that were within normal limits. She is seeing a mental health provider for history of bipolar disorder. Is taking medications as prescribed. Palpitations   This is a recurrent problem. The current episode started more than 1 month ago. The problem occurs every several days. The problem has been unchanged. Nothing aggravates the symptoms. Associated symptoms include an irregular heartbeat. Pertinent negatives include no anxiety, chest pain, coughing, dizziness, fever or shortness of breath. She has tried nothing for the symptoms. Risk factors include obesity. Her past medical history is significant for anxiety. Vitals:    05/13/21 1346   BP: 130/82   Site: Left Upper Arm   Position: Sitting   Cuff Size: Large Adult   Pulse: 83   Resp: 16   Temp: 97.3 °F (36.3 °C)   TempSrc: Temporal   SpO2: 97%   Weight: 280 lb 6.4 oz (127.2 kg)   Height: 5' 6.4\" (1.687 m)       REVIEW OF SYSTEMS    Review of Systems   Constitutional: Positive for unexpected weight change. Negative for fatigue and fever.    HENT: clubs or organizations: None     Relationship status: None    Intimate partner violence     Fear of current or ex partner: None     Emotionally abused: None     Physically abused: None     Forced sexual activity: None   Other Topics Concern    None   Social History Narrative    None       SURGICAL HISTORY    Past Surgical History:   Procedure Laterality Date    APPENDECTOMY      CHOLECYSTECTOMY      HERNIA REPAIR      HYSTERECTOMY      for bleeding    LAPAROSCOPY         CURRENT MEDICATIONS    Current Outpatient Medications   Medication Sig Dispense Refill    OLANZapine (ZYPREXA) 15 MG tablet Take 1 tablet by mouth daily      vitamin D (ERGOCALCIFEROL) 1.25 MG (93146 UT) CAPS capsule Take 1 capsule by mouth Twice a week      escitalopram (LEXAPRO) 10 MG tablet Take 10 mg by mouth daily      traZODone (DESYREL) 50 MG tablet Take 1 tablet by mouth nightly as needed for Sleep 14 tablet 0    melatonin ER 3 MG TBCR Take 3 mg by mouth nightly 14 tablet 0    vitamin B-6 (PYRIDOXINE) 50 MG tablet Take 50 mg by mouth daily       No current facility-administered medications for this visit. ALLERGIES    Allergies   Allergen Reactions    Nystatin Swelling    Strawberry Extract        PHYSICAL EXAM   Physical Exam  Vitals signs reviewed. Constitutional:       Appearance: She is well-developed. She is obese. HENT:      Head: Normocephalic. Eyes:      Conjunctiva/sclera: Conjunctivae normal.      Pupils: Pupils are equal, round, and reactive to light. Neck:      Musculoskeletal: Normal range of motion and neck supple. Thyroid: No thyromegaly. Cardiovascular:      Rate and Rhythm: Normal rate and regular rhythm. Heart sounds: Normal heart sounds. No murmur. Pulmonary:      Effort: Pulmonary effort is normal.      Breath sounds: Normal breath sounds. No wheezing or rales. Abdominal:      Palpations: Abdomen is soft. Tenderness: There is no abdominal tenderness.  There is no guarding or rebound. Musculoskeletal: Normal range of motion. General: No tenderness or deformity. Lymphadenopathy:      Cervical: No cervical adenopathy. Skin:     General: Skin is warm and dry. Findings: Rash (  Light red rash left groin) present. Neurological:      Mental Status: She is alert and oriented to person, place, and time. Psychiatric:         Mood and Affect: Mood normal.         Behavior: Behavior normal.         Thought Content: Thought content normal.         Judgment: Judgment normal.         ASSESSMENT/PLAN  1. Heart palpitations  Reviewed prior EKG with patient. Discussed that monitor could be obtained. This could be a 48-hour Holter or 2 to 3-week event monitor. She will call if she wants to proceed with this. Reviewed labs that were done within the last 6 months and normal    2. Dermatophytosis of groin  Continue nystatin. Try to keep area dry. Encouraged loose clothing at home such as boxer shorts    3. Bipolar 1 disorder (HCC)  Stable, continue seeing mental health provider    4. Class 3 severe obesity due to excess calories without serious comorbidity with body mass index (BMI) of 40.0 to 44.9 in adult [facility])  Encouraged outside activity and any other exercise that she is comfortable with. Agustin Birch received counseling on the following healthy behaviors: nutrition, exercise and medication adherence  Reviewed prior labs and health maintenance. Continue current medications, diet and exercise. Discussed use, benefit, and side effects of prescribed medications. Barriers to medication compliance addressed. Patient given educational materials - see patient instructions. All patient questions answered. Patient voiced understanding. Return if symptoms worsen or fail to improve, for Palpitations.         Electronically signed by Jihan Kwan MD on 5/13/21 at 1:52 PM EDT

## 2021-05-13 NOTE — PROGRESS NOTES
Depression screening done  Financial Resource Strain Completed  Chief Complaint   Patient presents with    Palpitations     betrter    Weight Gain

## 2021-05-18 ENCOUNTER — TELEPHONE (OUTPATIENT)
Dept: FAMILY MEDICINE CLINIC | Age: 51
End: 2021-05-18

## 2021-05-18 NOTE — TELEPHONE ENCOUNTER
Pt called in to schedule an appt for a lesion on the left hand x's 4-6 weeks. Pt states it is about 1 inch long, no pain unless bumps it against something. Pt currently scheduled in the next avail 7/27 but would really like to be seen w/in the next week or 2. Pt willing to see any provider. Please call Pt to discuss further. Also Pt has what she referred to as Psoriasis on her leg.

## 2021-05-21 ENCOUNTER — OFFICE VISIT (OUTPATIENT)
Dept: FAMILY MEDICINE CLINIC | Age: 51
End: 2021-05-21
Payer: COMMERCIAL

## 2021-05-21 VITALS
HEIGHT: 66 IN | SYSTOLIC BLOOD PRESSURE: 120 MMHG | DIASTOLIC BLOOD PRESSURE: 68 MMHG | BODY MASS INDEX: 46 KG/M2 | OXYGEN SATURATION: 98 % | HEART RATE: 87 BPM | WEIGHT: 286.2 LBS

## 2021-05-21 DIAGNOSIS — L30.1 DYSHIDROTIC ECZEMA: Primary | ICD-10-CM

## 2021-05-21 PROCEDURE — 3017F COLORECTAL CA SCREEN DOC REV: CPT | Performed by: FAMILY MEDICINE

## 2021-05-21 PROCEDURE — 1036F TOBACCO NON-USER: CPT | Performed by: FAMILY MEDICINE

## 2021-05-21 PROCEDURE — 99212 OFFICE O/P EST SF 10 MIN: CPT | Performed by: FAMILY MEDICINE

## 2021-05-21 PROCEDURE — G8417 CALC BMI ABV UP PARAM F/U: HCPCS | Performed by: FAMILY MEDICINE

## 2021-05-21 PROCEDURE — G8427 DOCREV CUR MEDS BY ELIG CLIN: HCPCS | Performed by: FAMILY MEDICINE

## 2021-05-21 ASSESSMENT — ENCOUNTER SYMPTOMS: ROS SKIN COMMENTS: SEE HPI

## 2021-05-21 NOTE — PROGRESS NOTES
[facility]  [facility]  [address]  [facility] [address]  Dept: [phone]    5/21/2021    CHIEF COMPLAINT    Chief Complaint   Patient presents with    Rash     both hands near thumb       HPI    Dari Perez is a 45 y.o. female who presents   Chief Complaint   Patient presents with    Rash     both hands near thumb   . Patient has developed a rough itchy rash on her left thumb and right palm. Present for the past month. She denies excessive handwashing or use of chemicals. No history of eczema. She has not tried anything on the rash      Vitals:    05/21/21 0931   BP: 120/68   Pulse: 87   SpO2: 98%   Weight: 286 lb 3.2 oz (129.8 kg)   Height: 5' 6\" (1.676 m)       REVIEW OF SYSTEMS    Review of Systems   Constitutional: Negative for fever. Skin: Positive for rash.         See HPI       PAST MEDICAL HISTORY    Past Medical History:   Diagnosis Date    Bipolar [facility] disorder [facility] [address]     Depressed     H/O: hysterectomy 9/22/2020    Obesity        FAMILY HISTORY    Family History   Problem Relation Age of Onset    Arthritis Mother     Hypertension Mother     Coronary Art Dis Father     Cancer Sister         rectal       SOCIAL HISTORY    Social History     Socioeconomic History    Marital status:      Spouse name: Dr. Robe Albrecht Number of children: 2    Years of education: None    Highest education level: None   Occupational History    None   Tobacco Use    Smoking status: Never Smoker    Smokeless tobacco: Never Used   Substance and Sexual Activity    Alcohol use: Never    Drug use: Not Currently    Sexual activity: None   Other Topics Concern    None   Social History Narrative    None     Social Determinants of Health     Financial Resource Strain: Low Risk     Difficulty of Paying Living Expenses: Not hard at all   Food Insecurity: No Food Insecurity    Worried About [address] in the Last Year: Never true   Lilly Darling lesion present on right palm   Neurological:      Mental Status: She is alert. Psychiatric:         Mood and Affect: Mood normal.         ASSESSMENT/PLAN  1. Dyshidrotic eczema  Discussed condition will probably be chronic and flare intermittently. Avoid excess handwashing or use of chemicals or detergents. Use good moisturizer frequently. Use steroid for only 2 weeks continuously and then take a break. Call if symptoms do not improve or are worsening.  - halobetasol (ULTRAVATE) 0.05 % cream; Apply topically 2 times daily  Dispense: 50 g; Refill: 1       Vy received counseling on the following healthy behaviors: nutrition, exercise and medication adherence  Reviewed prior labs and health maintenance. Continue current medications, diet and exercise. Discussed use, benefit, and side effects of prescribed medications. Barriers to medication compliance addressed. Patient given educational materials - see patient instructions. All patient questions answered. Patient voiced understanding. Return if symptoms worsen or fail to improve.         Electronically signed by Trevor Rapp MD on 5/21/21 at 9:40 AM EDT

## 2021-06-18 ENCOUNTER — TELEPHONE (OUTPATIENT)
Dept: FAMILY MEDICINE CLINIC | Age: 51
End: 2021-06-18

## 2021-06-18 NOTE — TELEPHONE ENCOUNTER
Patient called in stating that her right foot has been hurting for about two weeks. She states that she is not aware of hurting herself and she states that it feels muscular. She states that this is not a constant pain that it comes and goes but it is affecting the way that she walks. She did state that there is some swelling around her ankle. Would like to know what to do until appointment on 7/27/21. Please advise thank you.

## 2021-06-18 NOTE — TELEPHONE ENCOUNTER
I would suggest resting, icing, anti-inflammatories such as ibuprofen. Does she want to get an x-ray?   Does she want to see a podiatrist?

## 2021-11-11 ENCOUNTER — OFFICE VISIT (OUTPATIENT)
Dept: FAMILY MEDICINE CLINIC | Age: 51
End: 2021-11-11
Payer: COMMERCIAL

## 2021-11-11 VITALS
OXYGEN SATURATION: 96 % | HEART RATE: 81 BPM | TEMPERATURE: 97.9 F | SYSTOLIC BLOOD PRESSURE: 130 MMHG | BODY MASS INDEX: 47.74 KG/M2 | WEIGHT: 293 LBS | DIASTOLIC BLOOD PRESSURE: 78 MMHG

## 2021-11-11 DIAGNOSIS — R00.2 HEART PALPITATIONS: ICD-10-CM

## 2021-11-11 DIAGNOSIS — F25.1 SCHIZOAFFECTIVE DISORDER, DEPRESSIVE TYPE (HCC): ICD-10-CM

## 2021-11-11 DIAGNOSIS — F41.1 GENERALIZED ANXIETY DISORDER: Primary | ICD-10-CM

## 2021-11-11 DIAGNOSIS — E66.01 CLASS 3 SEVERE OBESITY DUE TO EXCESS CALORIES WITHOUT SERIOUS COMORBIDITY WITH BODY MASS INDEX (BMI) OF 40.0 TO 44.9 IN ADULT (HCC): ICD-10-CM

## 2021-11-11 DIAGNOSIS — E55.9 VITAMIN D DEFICIENCY: ICD-10-CM

## 2021-11-11 DIAGNOSIS — Z11.59 ENCOUNTER FOR HEPATITIS C SCREENING TEST FOR LOW RISK PATIENT: ICD-10-CM

## 2021-11-11 DIAGNOSIS — Z13.220 SCREENING FOR LIPID DISORDERS: ICD-10-CM

## 2021-11-11 DIAGNOSIS — F31.9 BIPOLAR 1 DISORDER (HCC): ICD-10-CM

## 2021-11-11 DIAGNOSIS — R94.6 ABNORMAL THYROID FUNCTION TEST: ICD-10-CM

## 2021-11-11 PROCEDURE — G8484 FLU IMMUNIZE NO ADMIN: HCPCS | Performed by: FAMILY MEDICINE

## 2021-11-11 PROCEDURE — G8417 CALC BMI ABV UP PARAM F/U: HCPCS | Performed by: FAMILY MEDICINE

## 2021-11-11 PROCEDURE — 1036F TOBACCO NON-USER: CPT | Performed by: FAMILY MEDICINE

## 2021-11-11 PROCEDURE — G8427 DOCREV CUR MEDS BY ELIG CLIN: HCPCS | Performed by: FAMILY MEDICINE

## 2021-11-11 PROCEDURE — 99214 OFFICE O/P EST MOD 30 MIN: CPT | Performed by: FAMILY MEDICINE

## 2021-11-11 PROCEDURE — 3017F COLORECTAL CA SCREEN DOC REV: CPT | Performed by: FAMILY MEDICINE

## 2021-11-11 RX ORDER — CLONAZEPAM 0.5 MG/1
0.5 TABLET ORAL DAILY PRN
COMMUNITY
End: 2022-07-12 | Stop reason: SDUPTHER

## 2021-11-11 ASSESSMENT — ENCOUNTER SYMPTOMS
ABDOMINAL PAIN: 0
COUGH: 0
ALLERGIC/IMMUNOLOGIC NEGATIVE: 1
DIARRHEA: 0
EYES NEGATIVE: 1
CONSTIPATION: 0
BLOOD IN STOOL: 0

## 2021-11-11 NOTE — PROGRESS NOTES
[facility]  [facility]  [address]  [address] [place] [address]  Dept: [phone]    11/11/2021    CHIEF COMPLAINT    Chief Complaint   Patient presents with    Hypertension    Anxiety       HPI    Nat Macdonald is a 45 y.o. female who presents   Chief Complaint   Patient presents with    Hypertension    Anxiety   . Recheck chronic conditions including anxiety, bipolar disorder, depression, insomnia. Taking medications as prescribed, seeing mental health provider. Not very active other than around the house. Due for labs      Vitals:    11/11/21 1404   BP: 130/78   Pulse: 81   Temp: 97.9 °F (36.6 °C)   SpO2: 96%   Weight: 295 lb 12.8 oz (134.2 kg)       REVIEW OF SYSTEMS    Review of Systems   Constitutional: Positive for fatigue. Negative for fever and unexpected weight change. HENT: Negative. Eyes: Negative. Respiratory: Negative for cough. Cardiovascular: Positive for palpitations (intermittent) and leg swelling (related to varicose veins). Negative for chest pain. Gastrointestinal: Negative for abdominal pain, blood in stool, constipation and diarrhea. Endocrine: Negative. Genitourinary: Negative for frequency and urgency. Musculoskeletal: Negative. Skin: Positive for rash (itchy patch rt medial ankle. ). Allergic/Immunologic: Negative. Neurological: Negative for dizziness. Hematological: Negative. Psychiatric/Behavioral: Positive for dysphoric mood and sleep disturbance. The patient is nervous/anxious.          Stable on current medications       PAST MEDICAL HISTORY    Past Medical History:   Diagnosis Date    Bipolar 1 disorder ([address]     Depressed     H/O: hysterectomy 9/22/2020    Obesity        FAMILY HISTORY    Family History   Problem Relation Age of Onset    Arthritis Mother     Hypertension Mother     Coronary Art Dis Father     Cancer Sister         rectal       SOCIAL HISTORY    Social History Socioeconomic History    Marital status:      Spouse name: Dr. Gutierrez Maciel Number of children: 2    Years of education: None    Highest education level: None   Occupational History    None   Tobacco Use    Smoking status: Never Smoker    Smokeless tobacco: Never Used   Substance and Sexual Activity    Alcohol use: Never    Drug use: Not Currently    Sexual activity: None   Other Topics Concern    None   Social History Narrative    None     Social Determinants of Health     Financial Resource Strain: Low Risk     Difficulty of Paying Living Expenses: Not hard at all   Food Insecurity: No Food Insecurity    Worried About Running Out of Food in the Last Year: Never true    José Manuel of Food in the Last Year: Never true   Transportation Needs: No Transportation Needs    Lack of Transportation (Medical): No    Lack of Transportation (Non-Medical):  No   Physical Activity:     Days of Exercise per Week: Not on file    Minutes of Exercise per Session: Not on file   Stress:     Feeling of Stress : Not on file   Social Connections:     Frequency of Communication with Friends and Family: Not on file    Frequency of Social Gatherings with Friends and Family: Not on file    Attends Restorationism Services: Not on file    Active Member of [address] [place] [address] or Organizations: Not on file    Attends Club or Organization Meetings: Not on file    Marital Status: Not on file   Intimate Partner Violence:     Fear of Current or Ex-Partner: Not on file    Emotionally Abused: Not on file    Physically Abused: Not on file    Sexually Abused: Not on file   Housing Stability:     Unable to Pay for Housing in the Last Year: Not on file    Number of [place] in the Last Year: Not on file    Unstable Housing in the Last Year: Not on file       SURGICAL HISTORY    Past Surgical History:   Procedure Laterality Date    APPENDECTOMY      CHOLECYSTECTOMY      HERNIA REPAIR      HYSTERECTOMY      for bleeding    LAPAROSCOPY         CURRENT MEDICATIONS    Current Outpatient Medications   Medication Sig Dispense Refill    clonazePAM (KLONOPIN) 0.5 MG tablet Take 0.5 mg by mouth daily as needed.  OLANZapine (ZYPREXA) 15 MG tablet Take 1 tablet by mouth daily      vitamin D (ERGOCALCIFEROL) 1.25 MG (74357 UT) CAPS capsule Take 1 capsule by mouth Twice a week      escitalopram (LEXAPRO) 10 MG tablet Take 10 mg by mouth daily      traZODone (DESYREL) 50 MG tablet Take 1 tablet by mouth nightly as needed for Sleep 14 tablet 0    halobetasol (ULTRAVATE) 0.05 % cream Apply topically 2 times daily (Patient not taking: Reported on 11/11/2021) 50 g 1    melatonin ER 3 MG TBCR Take 3 mg by mouth nightly (Patient not taking: Reported on 11/11/2021) 14 tablet 0    vitamin B-6 (PYRIDOXINE) 50 MG tablet Take 50 mg by mouth daily       No current facility-administered medications for this visit. ALLERGIES    Allergies   Allergen Reactions    Nystatin Swelling    Strawberry Extract        PHYSICAL EXAM   Physical Exam  Vitals reviewed. Constitutional:       Appearance: She is well-developed. She is obese. HENT:      Head: Normocephalic. Eyes:      Conjunctiva/sclera: Conjunctivae normal.      Pupils: Pupils are equal, round, and reactive to light. Neck:      Thyroid: No thyromegaly. Cardiovascular:      Rate and Rhythm: Normal rate and regular rhythm. Heart sounds: Normal heart sounds. No murmur heard. Pulmonary:      Effort: Pulmonary effort is normal.      Breath sounds: Normal breath sounds. No wheezing or rales. Abdominal:      Palpations: Abdomen is soft. Tenderness: There is no abdominal tenderness. There is no guarding or rebound. Musculoskeletal:         General: No tenderness or deformity. Normal range of motion. Cervical back: Normal range of motion and neck supple. Lymphadenopathy:      Cervical: No cervical adenopathy. Skin:     General: Skin is warm and dry.       Findings: Rash present. Comments: Patch of dry flaky skin about 2 cm in diameter on the right medial ankle   Neurological:      Mental Status: She is alert and oriented to person, place, and time. Psychiatric:         Mood and Affect: Mood normal.         Behavior: Behavior normal.         Thought Content: Thought content normal.         Judgment: Judgment normal.      Comments: Somewhat flattened affect and poor eye contact         ASSESSMENT/PLAN  1. Generalized anxiety disorder  Continue prescribed medication  - TSH without Reflex; Future  - T4, Free; Future    2. Bipolar 1 disorder ([facility]  Continue meds and seeing mental health provider    3. Abnormal thyroid function test  Reviewed prior labs showing gradual decrease in T4 over the past 2 years. Recheck in start supplement if indicated  - TSH without Reflex; Future  - T4, Free; Future    4. Schizoaffective disorder, depressive type ([facility]  Stable on medication    5. Heart palpitations  Continue to monitor intermittent symptoms  - CBC Auto Differential; Future  - Comprehensive Metabolic Panel; Future    6. Vitamin D deficiency  Adjust supplement if indicated  - Vitamin D 25 Hydroxy; Future    7. Class 3 severe obesity due to excess calories without serious comorbidity with body mass index (BMI) of 40.0 to 44.9 in adult [facility])  Encouraged regular activity    8. Encounter for hepatitis C screening test for low risk patient    - Hepatitis C Antibody; Future    9. Screening for lipid disorders    - Lipid Panel; Future       Vy received counseling on the following healthy behaviors: nutrition, exercise and medication adherence  Reviewed prior labs and health maintenance. Continue current medications, diet and exercise. Discussed use, benefit, and side effects of prescribed medications. Barriers to medication compliance addressed. Patient given educational materials - see patient instructions. All patient questions answered. Patient voiced understanding. Return in about 6 months (around 5/11/2022) for thyroid, bp check.         Electronically signed by Debbie Dutta MD on 11/11/21 at 2:12 PM EST

## 2021-12-10 ENCOUNTER — TELEPHONE (OUTPATIENT)
Dept: FAMILY MEDICINE CLINIC | Age: 51
End: 2021-12-10

## 2021-12-10 NOTE — TELEPHONE ENCOUNTER
Pt went to [facility]: Fall  Right wrist fracture confirmed. Asking for referral to Ortho surgeon. Please, place order and fax. Notify when done. Asking to be seen today with ortho s.

## 2021-12-13 DIAGNOSIS — S62.101S FX WRIST, RIGHT, SEQUELA: Primary | ICD-10-CM

## 2021-12-13 RX ORDER — HYDROCODONE BITARTRATE AND ACETAMINOPHEN 5; 325 MG/1; MG/1
1 TABLET ORAL EVERY 6 HOURS PRN
Qty: 28 TABLET | Refills: 0 | Status: SHIPPED | OUTPATIENT
Start: 2021-12-13 | End: 2021-12-20

## 2021-12-13 NOTE — TELEPHONE ENCOUNTER
Pt went to [address] ED on 12.9.2021, right wrist fracture. Has appt with ortho on 12.14.21. Norco 5-325 mg, 1 tab every 6 hrs prn  Asking for refills.     Keven Kohli [address] [place] [address], [address] [place] [address] [phone]

## 2022-03-16 ENCOUNTER — TELEPHONE (OUTPATIENT)
Dept: FAMILY MEDICINE CLINIC | Age: 52
End: 2022-03-16

## 2022-03-16 NOTE — TELEPHONE ENCOUNTER
Patients states she has been having heart palpations for two days. Patient states the heart palpations are strong. I advise patient to go to the emergency room. Patient is requesting to be seen by physician tomorrow. I advise patient her physician has no openings.  Please advise

## 2022-03-17 ENCOUNTER — OFFICE VISIT (OUTPATIENT)
Dept: FAMILY MEDICINE CLINIC | Age: 52
End: 2022-03-17
Payer: COMMERCIAL

## 2022-03-17 VITALS
SYSTOLIC BLOOD PRESSURE: 136 MMHG | OXYGEN SATURATION: 97 % | WEIGHT: 293 LBS | HEIGHT: 66 IN | BODY MASS INDEX: 47.09 KG/M2 | HEART RATE: 82 BPM | DIASTOLIC BLOOD PRESSURE: 90 MMHG

## 2022-03-17 DIAGNOSIS — E55.9 VITAMIN D DEFICIENCY: ICD-10-CM

## 2022-03-17 DIAGNOSIS — Z11.59 ENCOUNTER FOR HEPATITIS C SCREENING TEST FOR LOW RISK PATIENT: ICD-10-CM

## 2022-03-17 DIAGNOSIS — R00.2 HEART PALPITATIONS: Primary | ICD-10-CM

## 2022-03-17 DIAGNOSIS — Z13.220 SCREENING FOR LIPID DISORDERS: ICD-10-CM

## 2022-03-17 DIAGNOSIS — R53.82 CHRONIC FATIGUE: ICD-10-CM

## 2022-03-17 DIAGNOSIS — R94.6 ABNORMAL THYROID FUNCTION TEST: ICD-10-CM

## 2022-03-17 PROCEDURE — 3017F COLORECTAL CA SCREEN DOC REV: CPT | Performed by: FAMILY MEDICINE

## 2022-03-17 PROCEDURE — G8484 FLU IMMUNIZE NO ADMIN: HCPCS | Performed by: FAMILY MEDICINE

## 2022-03-17 PROCEDURE — 99214 OFFICE O/P EST MOD 30 MIN: CPT | Performed by: FAMILY MEDICINE

## 2022-03-17 PROCEDURE — G8417 CALC BMI ABV UP PARAM F/U: HCPCS | Performed by: FAMILY MEDICINE

## 2022-03-17 PROCEDURE — 1036F TOBACCO NON-USER: CPT | Performed by: FAMILY MEDICINE

## 2022-03-17 PROCEDURE — G8427 DOCREV CUR MEDS BY ELIG CLIN: HCPCS | Performed by: FAMILY MEDICINE

## 2022-03-17 SDOH — ECONOMIC STABILITY: FOOD INSECURITY: WITHIN THE PAST 12 MONTHS, THE FOOD YOU BOUGHT JUST DIDN'T LAST AND YOU DIDN'T HAVE MONEY TO GET MORE.: NEVER TRUE

## 2022-03-17 SDOH — ECONOMIC STABILITY: FOOD INSECURITY: WITHIN THE PAST 12 MONTHS, YOU WORRIED THAT YOUR FOOD WOULD RUN OUT BEFORE YOU GOT MONEY TO BUY MORE.: NEVER TRUE

## 2022-03-17 ASSESSMENT — ENCOUNTER SYMPTOMS
BLOOD IN STOOL: 0
COUGH: 0
EYES NEGATIVE: 1
DIARRHEA: 0
ABDOMINAL PAIN: 0
SHORTNESS OF BREATH: 1
ALLERGIC/IMMUNOLOGIC NEGATIVE: 1
CONSTIPATION: 0

## 2022-03-17 ASSESSMENT — SOCIAL DETERMINANTS OF HEALTH (SDOH): HOW HARD IS IT FOR YOU TO PAY FOR THE VERY BASICS LIKE FOOD, HOUSING, MEDICAL CARE, AND HEATING?: NOT HARD AT ALL

## 2022-03-17 NOTE — PROGRESS NOTES
[address] [place] [address] [address]  Dept: [phone]    3/17/2022    CHIEF COMPLAINT    Chief Complaint   Patient presents with    Palpitations     6 months       HPI    Malcom Barcenas is a 45 y.o. female who presents   Chief Complaint   Patient presents with    Palpitations     6 months   . Office visit for new onset palpitations. They occur several times a week and last for only a few seconds. She has had one episode of feeling like her heart was being squeezed which lasted just a few seconds. History of anxiety and bipolar disorder. Being seen by mental health provider and taking medications as prescribed. She admits to shortness of breath with stair climbing. She attributes this to her weight and deconditioning. She does not exercise regularly. Vitals:    03/17/22 0910 03/17/22 0913   BP: (!) 140/90 (!) 136/90   Pulse: 82    SpO2: 97%    Weight: (!) 307 lb (139.3 kg)    Height: 5' 6\" (1.676 m)        REVIEW OF SYSTEMS    Review of Systems   Constitutional: Positive for fatigue. Negative for fever and unexpected weight change. HENT: Negative. Eyes: Negative. Respiratory: Positive for shortness of breath (  With exertion). Negative for cough. Cardiovascular: Negative for chest pain and leg swelling. Gastrointestinal: Negative for abdominal pain, blood in stool, constipation and diarrhea. Endocrine: Negative. Genitourinary: Negative for frequency and urgency. Musculoskeletal: Negative. Skin: Negative. Allergic/Immunologic: Negative. Neurological: Negative for dizziness and headaches. Hematological: Negative. Psychiatric/Behavioral: Negative for sleep disturbance. The patient is not nervous/anxious.          Mood stable on medication       PAST MEDICAL HISTORY    Past Medical History:   Diagnosis Date    Bipolar 1 disorder ([facility] [address]     Depressed     H/O: hysterectomy 9/22/2020  Obesity        FAMILY HISTORY    Family History   Problem Relation Age of Onset    Arthritis Mother     Hypertension Mother     Coronary Art Dis Father     Cancer Sister         rectal       SOCIAL HISTORY    Social History     Socioeconomic History    Marital status:      Spouse name: Dr. Lorna Morales Number of children: 2    Years of education: None    Highest education level: None   Occupational History    None   Tobacco Use    Smoking status: Never Smoker    Smokeless tobacco: Never Used   Substance and Sexual Activity    Alcohol use: Never    Drug use: Not Currently    Sexual activity: None   Other Topics Concern    None   Social History Narrative    None     Social Determinants of Health     Financial Resource Strain: Low Risk     Difficulty of Paying Living Expenses: Not hard at all   Food Insecurity: No Food Insecurity    Worried About Running Out of Food in the Last Year: Never true    José Manuel of Food in the Last Year: Never true   Transportation Needs:     Lack of Transportation (Medical): Not on file    Lack of Transportation (Non-Medical):  Not on file   Physical Activity:     Days of Exercise per Week: Not on file    Minutes of Exercise per Session: Not on file   Stress:     Feeling of Stress : Not on file   Social Connections:     Frequency of Communication with Friends and Family: Not on file    Frequency of Social Gatherings with Friends and Family: Not on file    Attends Mosque Services: Not on file    Active Member of Clubs or Organizations: Not on file    Attends Club or Organization Meetings: Not on file    Marital Status: Not on file   Intimate Partner Violence:     Fear of Current or Ex-Partner: Not on file    Emotionally Abused: Not on file    Physically Abused: Not on file    Sexually Abused: Not on file   Housing Stability:     Unable to Pay for Housing in the Last Year: Not on file    Number of [place] in the Last Year: Not on file    Unstable Housing in the Last Year: Not on file       SURGICAL HISTORY    Past Surgical History:   Procedure Laterality Date    APPENDECTOMY      CHOLECYSTECTOMY      HERNIA REPAIR      HYSTERECTOMY      for bleeding    LAPAROSCOPY         CURRENT MEDICATIONS    Current Outpatient Medications   Medication Sig Dispense Refill    clonazePAM (KLONOPIN) 0.5 MG tablet Take 0.5 mg by mouth daily as needed.  halobetasol (ULTRAVATE) 0.05 % cream Apply topically 2 times daily 50 g 1    OLANZapine (ZYPREXA) 15 MG tablet Take 1 tablet by mouth daily      escitalopram (LEXAPRO) 10 MG tablet Take 10 mg by mouth daily      traZODone (DESYREL) 50 MG tablet Take 1 tablet by mouth nightly as needed for Sleep 14 tablet 0    melatonin ER 3 MG TBCR Take 3 mg by mouth nightly 14 tablet 0    vitamin B-6 (PYRIDOXINE) 50 MG tablet Take 50 mg by mouth daily      vitamin D (ERGOCALCIFEROL) 1.25 MG (58624 UT) CAPS capsule Take 1 capsule by mouth Twice a week       No current facility-administered medications for this visit. ALLERGIES    Allergies   Allergen Reactions    Nystatin Swelling    Strawberry Extract        PHYSICAL EXAM   Physical Exam  Vitals reviewed. Constitutional:       Appearance: She is well-developed. She is obese. HENT:      Head: Normocephalic. Eyes:      Conjunctiva/sclera: Conjunctivae normal.      Pupils: Pupils are equal, round, and reactive to light. Neck:      Thyroid: No thyromegaly. Cardiovascular:      Rate and Rhythm: Normal rate and regular rhythm. Heart sounds: Normal heart sounds. No murmur heard. Pulmonary:      Effort: Pulmonary effort is normal.      Breath sounds: Normal breath sounds. No wheezing or rales. Abdominal:      Palpations: Abdomen is soft. Tenderness: There is no abdominal tenderness. There is no guarding or rebound. Musculoskeletal:         General: No tenderness or deformity. Normal range of motion.       Cervical back: Normal range of motion and neck supple. Lymphadenopathy:      Cervical: No cervical adenopathy. Skin:     General: Skin is warm and dry. Neurological:      Mental Status: She is alert and oriented to person, place, and time. Psychiatric:         Mood and Affect: Mood normal.         Behavior: Behavior normal.         Thought Content: Thought content normal.         Judgment: Judgment normal.         ASSESSMENT/PLAN  1. Heart palpitations  Check labs and continue to monitor symptoms. If becoming more frequent or lasting longer than it event monitor may be warranted. If any sustained chest pressure she is to go to the emergency department  - CBC with Auto Differential; Future  - Comprehensive Metabolic Panel; Future  - TSH; Future  - T4, Free; Future    2. Vitamin D deficiency  Adjust supplement if indicated  - Vitamin D 25 Hydroxy; Future    3. Chronic fatigue  Check labs. Continue trying to follow healthy lifestyle  - CBC with Auto Differential; Future  - Vitamin B12; Future  - TSH; Future  - T4, Free; Future    4. Abnormal thyroid function test  Recheck  - TSH; Future  - T4, Free; Future    5. Screening for lipid disorders    - Lipid Panel; Future    6. Encounter for hepatitis C screening test for low risk patient    - Hepatitis C Antibody; Future       Vy received counseling on the following healthy behaviors: nutrition, exercise and medication adherence  Reviewed prior labs and health maintenance. Continue current medications, diet and exercise. Discussed use, benefit, and side effects of prescribed medications. Barriers to medication compliance addressed. Patient given educational materials - see patient instructions. All patient questions answered. Patient voiced understanding. Return if symptoms worsen or fail to improve.         Electronically signed by Elizabeth Amezquita MD on 3/17/22 at 9:16 AM EDT

## 2022-03-23 LAB
ALBUMIN SERPL-MCNC: NORMAL G/DL
ALP BLD-CCNC: NORMAL U/L
ALT SERPL-CCNC: NORMAL U/L
ANION GAP SERPL CALCULATED.3IONS-SCNC: NORMAL MMOL/L
ANTIBODY: NORMAL
AST SERPL-CCNC: NORMAL U/L
BASOPHILS ABSOLUTE: NORMAL
BASOPHILS RELATIVE PERCENT: NORMAL
BILIRUB SERPL-MCNC: NORMAL MG/DL
BUN BLDV-MCNC: NORMAL MG/DL
CALCIUM SERPL-MCNC: NORMAL MG/DL
CHLORIDE BLD-SCNC: NORMAL MMOL/L
CHOLESTEROL, TOTAL: NORMAL
CHOLESTEROL/HDL RATIO: NORMAL
CO2: NORMAL
CREAT SERPL-MCNC: NORMAL MG/DL
EOSINOPHILS ABSOLUTE: NORMAL
EOSINOPHILS RELATIVE PERCENT: NORMAL
GFR CALCULATED: NORMAL
GLUCOSE BLD-MCNC: NORMAL MG/DL
HCT VFR BLD CALC: NORMAL %
HDLC SERPL-MCNC: NORMAL MG/DL
HEMOGLOBIN: NORMAL
LDL CHOLESTEROL CALCULATED: NORMAL
LYMPHOCYTES ABSOLUTE: NORMAL
LYMPHOCYTES RELATIVE PERCENT: NORMAL
MCH RBC QN AUTO: NORMAL PG
MCHC RBC AUTO-ENTMCNC: NORMAL G/DL
MCV RBC AUTO: NORMAL FL
MONOCYTES ABSOLUTE: NORMAL
MONOCYTES RELATIVE PERCENT: NORMAL
NEUTROPHILS ABSOLUTE: NORMAL
NEUTROPHILS RELATIVE PERCENT: NORMAL
NONHDLC SERPL-MCNC: NORMAL MG/DL
PDW BLD-RTO: NORMAL %
PLATELET # BLD: NORMAL 10*3/UL
PMV BLD AUTO: NORMAL FL
POTASSIUM SERPL-SCNC: NORMAL MMOL/L
RBC # BLD: NORMAL 10*6/UL
SODIUM BLD-SCNC: NORMAL MMOL/L
T4 FREE: NORMAL
TOTAL PROTEIN: NORMAL
TRIGL SERPL-MCNC: NORMAL MG/DL
TSH SERPL DL<=0.05 MIU/L-ACNC: NORMAL M[IU]/L
VITAMIN B-12: NORMAL
VITAMIN D 25-HYDROXY: NORMAL
VITAMIN D2, 25 HYDROXY: NORMAL
VITAMIN D3,25 HYDROXY: NORMAL
VLDLC SERPL CALC-MCNC: NORMAL MG/DL
WBC # BLD: NORMAL 10*3/UL

## 2022-03-28 DIAGNOSIS — R53.82 CHRONIC FATIGUE: ICD-10-CM

## 2022-03-28 DIAGNOSIS — Z11.59 ENCOUNTER FOR HEPATITIS C SCREENING TEST FOR LOW RISK PATIENT: ICD-10-CM

## 2022-03-28 DIAGNOSIS — R94.6 ABNORMAL THYROID FUNCTION TEST: ICD-10-CM

## 2022-03-28 DIAGNOSIS — Z13.220 SCREENING FOR LIPID DISORDERS: ICD-10-CM

## 2022-03-28 DIAGNOSIS — R00.2 HEART PALPITATIONS: ICD-10-CM

## 2022-03-28 DIAGNOSIS — E55.9 VITAMIN D DEFICIENCY: ICD-10-CM

## 2022-05-12 ENCOUNTER — OFFICE VISIT (OUTPATIENT)
Dept: FAMILY MEDICINE CLINIC | Age: 52
End: 2022-05-12
Payer: COMMERCIAL

## 2022-05-12 VITALS
SYSTOLIC BLOOD PRESSURE: 124 MMHG | BODY MASS INDEX: 49.91 KG/M2 | HEART RATE: 80 BPM | DIASTOLIC BLOOD PRESSURE: 74 MMHG | WEIGHT: 293 LBS

## 2022-05-12 DIAGNOSIS — F31.9 BIPOLAR 1 DISORDER (HCC): ICD-10-CM

## 2022-05-12 DIAGNOSIS — B35.9 DERMATOPHYTOSIS: ICD-10-CM

## 2022-05-12 DIAGNOSIS — E66.01 CLASS 3 SEVERE OBESITY DUE TO EXCESS CALORIES WITHOUT SERIOUS COMORBIDITY WITH BODY MASS INDEX (BMI) OF 40.0 TO 44.9 IN ADULT (HCC): ICD-10-CM

## 2022-05-12 DIAGNOSIS — F25.1 SCHIZOAFFECTIVE DISORDER, DEPRESSIVE TYPE (HCC): ICD-10-CM

## 2022-05-12 DIAGNOSIS — N39.3 STRESS INCONTINENCE IN FEMALE: Primary | ICD-10-CM

## 2022-05-12 DIAGNOSIS — Z12.31 ENCOUNTER FOR SCREENING MAMMOGRAM FOR MALIGNANT NEOPLASM OF BREAST: ICD-10-CM

## 2022-05-12 PROCEDURE — G8427 DOCREV CUR MEDS BY ELIG CLIN: HCPCS | Performed by: FAMILY MEDICINE

## 2022-05-12 PROCEDURE — 99214 OFFICE O/P EST MOD 30 MIN: CPT | Performed by: FAMILY MEDICINE

## 2022-05-12 PROCEDURE — 3017F COLORECTAL CA SCREEN DOC REV: CPT | Performed by: FAMILY MEDICINE

## 2022-05-12 PROCEDURE — G8417 CALC BMI ABV UP PARAM F/U: HCPCS | Performed by: FAMILY MEDICINE

## 2022-05-12 PROCEDURE — 1036F TOBACCO NON-USER: CPT | Performed by: FAMILY MEDICINE

## 2022-05-12 RX ORDER — NYSTATIN 100000 [USP'U]/G
POWDER TOPICAL
Qty: 60 G | Refills: 2 | Status: SHIPPED
Start: 2022-05-12 | End: 2022-07-12 | Stop reason: ALTCHOICE

## 2022-05-12 ASSESSMENT — ENCOUNTER SYMPTOMS
ABDOMINAL PAIN: 0
ALLERGIC/IMMUNOLOGIC NEGATIVE: 1
SHORTNESS OF BREATH: 0
EYES NEGATIVE: 1
BLOOD IN STOOL: 0
DIARRHEA: 0
CONSTIPATION: 0
COUGH: 0

## 2022-05-12 NOTE — PROGRESS NOTES
[address] [facility] [address]  Dept: [phone]    5/12/2022    CHIEF COMPLAINT    Chief Complaint   Patient presents with    Bladder Problem       HPI    London De Jesus is a 45 y.o. female who presents   Chief Complaint   Patient presents with    Bladder Problem   . Recheck chronic conditions of depression, anxiety, insomnia, obesity and vitamin d deficiency. Had a recent fx of 5th metatarsal, in a walking boot. Seeing mental health provider. Her father is currently in the hospital with possible heart attack. She expresses worry about his condition. Vitals:    05/12/22 1258   BP: 124/74   Pulse: 80   Weight: (!) 309 lb 3.2 oz (140.3 kg)       REVIEW OF SYSTEMS    Review of Systems   Constitutional: Positive for fatigue and unexpected weight change. Negative for fever. HENT: Negative. Eyes: Negative. Respiratory: Negative for cough and shortness of breath. Cardiovascular: Negative for chest pain and leg swelling. Gastrointestinal: Negative for abdominal pain, blood in stool, constipation and diarrhea. Endocrine: Negative. Genitourinary: Positive for frequency and urgency. Musculoskeletal: Positive for arthralgias (left foot pain, in a boot). Skin: Positive for rash (  Bilateral groin). Allergic/Immunologic: Negative. Neurological: Negative for dizziness and headaches. Hematological: Negative. Psychiatric/Behavioral: Positive for dysphoric mood and sleep disturbance. The patient is nervous/anxious.          Stable with mental health provider       PAST MEDICAL HISTORY    Past Medical History:   Diagnosis Date    Bipolar 1 disorder ([facility] [address]     Depressed     H/O: hysterectomy 09/22/2020    prolapsed uterus    Obesity        FAMILY HISTORY    Family History   Problem Relation Age of Onset    Arthritis Mother     Hypertension Mother     Coronary Art Dis Father     Cancer Sister         rectal       SOCIAL HISTORY    Social History     Socioeconomic History    Marital status:      Spouse name:  Baljit Hernandez Number of children: 2    Years of education: None    Highest education level: None   Occupational History    None   Tobacco Use    Smoking status: Never Smoker    Smokeless tobacco: Never Used   Substance and Sexual Activity    Alcohol use: Never    Drug use: Not Currently    Sexual activity: None   Other Topics Concern    None   Social History Narrative    None     Social Determinants of Health     Financial Resource Strain: Low Risk     Difficulty of Paying Living Expenses: Not hard at all   Food Insecurity: No Food Insecurity    Worried About Running Out of Food in the Last Year: Never true    José Manuel of Food in the Last Year: Never true   Transportation Needs:     Lack of Transportation (Medical): Not on file    Lack of Transportation (Non-Medical):  Not on file   Physical Activity:     Days of Exercise per Week: Not on file    Minutes of Exercise per Session: Not on file   Stress:     Feeling of Stress : Not on file   Social Connections:     Frequency of Communication with Friends and Family: Not on file    Frequency of Social Gatherings with Friends and Family: Not on file    Attends Quaker Services: Not on file    Active Member of [address] [place] [address] or Organizations: Not on file    Attends Club or Organization Meetings: Not on file    Marital Status: Not on file   Intimate Partner Violence:     Fear of Current or Ex-Partner: Not on file    Emotionally Abused: Not on file    Physically Abused: Not on file    Sexually Abused: Not on file   Housing Stability:     Unable to Pay for Housing in the Last Year: Not on file    Number of [place] in the Last Year: Not on file    Unstable Housing in the Last Year: Not on file       SURGICAL HISTORY    Past Surgical History:   Procedure Laterality Date    APPENDECTOMY      CHOLECYSTECTOMY      HERNIA REPAIR      HYSTERECTOMY      for bleeding    LAPAROSCOPY         CURRENT MEDICATIONS    Current Outpatient Medications   Medication Sig Dispense Refill    nystatin (MYCOSTATIN) 527802 UNIT/GM powder Apply 3 times daily. 60 g 2    clonazePAM (KLONOPIN) 0.5 MG tablet Take 0.5 mg by mouth daily as needed.  halobetasol (ULTRAVATE) 0.05 % cream Apply topically 2 times daily 50 g 1    OLANZapine (ZYPREXA) 15 MG tablet Take 1 tablet by mouth daily      vitamin D (ERGOCALCIFEROL) 1.25 MG (62793 UT) CAPS capsule Take 1 capsule by mouth Twice a week      escitalopram (LEXAPRO) 10 MG tablet Take 10 mg by mouth daily      traZODone (DESYREL) 50 MG tablet Take 1 tablet by mouth nightly as needed for Sleep 14 tablet 0    vitamin B-6 (PYRIDOXINE) 50 MG tablet Take 50 mg by mouth daily       No current facility-administered medications for this visit. ALLERGIES    Allergies   Allergen Reactions    Strawberry Extract        PHYSICAL EXAM   Physical Exam  Vitals reviewed. Constitutional:       Appearance: She is well-developed. She is obese. HENT:      Head: Normocephalic. Eyes:      Conjunctiva/sclera: Conjunctivae normal.      Pupils: Pupils are equal, round, and reactive to light. Neck:      Thyroid: No thyromegaly. Cardiovascular:      Rate and Rhythm: Normal rate and regular rhythm. Heart sounds: Normal heart sounds. No murmur heard. Pulmonary:      Effort: Pulmonary effort is normal.      Breath sounds: Normal breath sounds. No wheezing or rales. Abdominal:      Palpations: Abdomen is soft. Tenderness: There is no abdominal tenderness. There is no guarding or rebound. Musculoskeletal:         General: No tenderness or deformity. Normal range of motion. Cervical back: Normal range of motion and neck supple. Lymphadenopathy:      Cervical: No cervical adenopathy. Skin:     General: Skin is warm and dry.    Neurological:      Mental Status: She is alert and oriented to person, place, and time. Psychiatric:         Mood and Affect: Mood normal.         Behavior: Behavior normal.         Thought Content: Thought content normal.         Judgment: Judgment normal.         ASSESSMENT/PLAN  1. Stress incontinence in female  Patient education provided for bladder training and pelvic floor exercises. Discussed possible referral to urogynecologist or physical therapy if she would like. 2. Dermatophytosis  Keep area dry and clean  - nystatin (MYCOSTATIN) 262483 UNIT/GM powder; Apply 3 times daily. Dispense: 60 g; Refill: 2    3. Bipolar 1 disorder (HCC)  Chronic, unchanged. Follow with mental health provider    4. Schizoaffective disorder, depressive type ([facility] [address]  As above    5. Class 3 severe obesity due to excess calories without serious comorbidity with body mass index (BMI) of 40.0 to 44.9 in [facility])  Continue efforts to follow healthy diet and get routine activity. Activity is limited currently due to left foot fracture    6. Encounter for screening mammogram for malignant neoplasm of breast    - YAHIR DIGITAL SCREEN W OR WO CAD BILATERAL; Future       Vy received counseling on the following healthy behaviors: nutrition, exercise and medication adherence  Reviewed prior labs and health maintenance. Continue current medications, diet and exercise. Discussed use, benefit, and side effects of prescribed medications. Barriers to medication compliance addressed. Patient given educational materials - see patient instructions. All patient questions answered. Patient voiced understanding. Return in about 6 months (around 11/12/2022).         Electronically signed by Sosa Millard MD on 5/12/22 at 1:04 PM EDT

## 2022-05-12 NOTE — PATIENT INSTRUCTIONS
Patient Education        Kegel Exercises: Care Instructions  Overview     Kegel exercises strengthen muscles around the bladder. These muscles control the flow of urine. Kegel exercises are sometime called \"pelvic floor\" exercises. They can help prevent urine leakage and keep the pelvic organs inplace. Kegel exercises can strengthen pelvic muscles that have been weakened by age, pregnancy, childbirth, and surgery. They may help prevent or treat urineleakage. You do Kegel exercises by tightening the muscles you use when you urinate. Rivka Crigler likely need to do these exercises for several weeks to get better. Follow-up care is a key part of your treatment and safety. Be sure to make and go to all appointments, and call your doctor if you are having problems. It's also a good idea to know your test results and keep alist of the medicines you take. How can you care for yourself at home?  Do Kegel exercises. ? Find the muscles you need to strengthen. To do this, tighten the muscles that stop your urine while you are going to the bathroom. These are the same muscles you squeeze during Kegel exercises. ? Squeeze the muscles as hard as you can. Your belly and thighs should not move. ? Hold the squeeze for 3 seconds. Then relax for 3 seconds. ? Start with 3 seconds, and then add 1 second each week until you are able to squeeze for 10 seconds. ? Repeat the exercise 10 to 15 times for each session. Do three or more sessions each day.  You can check to see if you are using the right muscles. ? Tighten the muscles that help you stop passing gas or keep you from urinating. Make sure you aren't using your stomach, leg, or buttock muscles. ? Place a finger in your vagina and squeeze around it. You are doing them right when you feel pressure around your finger. Your doctor may also suggest that you put special weights in your vagina while you do the exercises.    Check with your doctor if you don't notice a difference after trying these exercises for several weeks. Your doctor may suggest getting help from a physical therapist or recommend other treatment. Where can you learn more? Go to https://chpepiceweb.CenturyLink. org and sign in to your [employer] account. Enter U894 in the [employer]IE[employer] box to learn more about \"Kegel Exercises: Care Instructions. \"     If you do not have an account, please click on the \"Sign Up Now\" link. Current as of: October 18, 2021               Content Version: 13.2  © 2006-2022 [employer]. Care instructions adapted under license by [facility] ([facility]). If you have questions about a medical condition or this instruction, always ask your healthcare professional. [address] any warranty or liability for your use of this information. Patient Education        Bladder Training: Care Instructions  Your Care Instructions     Bladder training is used to treat urge incontinence and stress incontinence. Urge incontinence means that the need to urinate comes on so fast that you can't get to a toilet in time. Stress incontinence means that you leak urine because of pressure on your bladder. For example, it may happen when you laugh,cough, or lift something heavy. Bladder training can increase how long you can wait before you have to urinate. It can also help your bladder hold more urine. And it can give you bettercontrol over the urge to urinate. It is important to remember that bladder training takes a few weeks to a few months to make a difference. You may not see results right away, but don't giveup. Follow-up care is a key part of your treatment and safety. Be sure to make and go to all appointments, and call your doctor if you are having problems. It's also a good idea to know your test results and keep alist of the medicines you take. How can you care for yourself at home?   Work with your doctor to come up with a bladder training program that is rightfor you. You may use one or more of the following methods. Delayed urination   In the beginning, try to keep from urinating for 5 minutes after you first feel the need to go.  While you wait, take deep, slow breaths to relax. Kegel exercises can also help you delay the need to go to the bathroom.  After some practice, when you can easily wait 5 minutes to urinate, try to wait 10 minutes before you urinate.  Slowly increase the waiting period until you are able to control when you have to urinate. Scheduled urination   Empty your bladder when you first wake up in the morning.  Schedule times throughout the day when you will urinate.  Start by going to the bathroom every hour, even if you don't need to go.  Slowly increase the time between trips to the bathroom.  When you have found a schedule that works well for you, keep doing it.  If you wake up during the night and have to urinate, do it. Apply your schedule to waking hours only. Kegel exercises  These tighten and strengthen pelvic muscles, which can help you control theflow of urine. To do Kegel exercises:   Squeeze the same muscles you would use to stop your urine. Your belly and thighs should not move.  Hold the squeeze for 3 seconds, and then relax for 3 seconds.  Start with 3 seconds. Then add 1 second each week until you are able to squeeze for 10 seconds.  Repeat the exercise 10 to 15 times a session. Do three or more sessions a day. When should you call for help? Watch closely for changes in your health, and be sure to contact your doctor if:     Your incontinence is getting worse.      You do not get better as expected. Where can you learn more? Go to https://kaitlin.[employer]. org and sign in to your [employer] account. Enter W668 in the [employer] box to learn more about \"Bladder Training: Care Instructions. \"     If you do not have an account, please click on the \"Sign Up Now\" link.   Current as of: October 18, 2021               Content Version: 13.2  © 3084-4059 [employer]. Care instructions adapted under license by [facility] ([facility]). If you have questions about a medical condition or this instruction, always ask your healthcare professional. [address] any warranty or liability for your use of this information.

## 2022-05-13 ENCOUNTER — TELEPHONE (OUTPATIENT)
Dept: FAMILY MEDICINE CLINIC | Age: 52
End: 2022-05-13

## 2022-05-13 DIAGNOSIS — B35.9 DERMATOPHYTOSIS: Primary | ICD-10-CM

## 2022-05-13 RX ORDER — KETOCONAZOLE 20 MG/G
CREAM TOPICAL
Qty: 60 G | Refills: 1 | Status: SHIPPED | OUTPATIENT
Start: 2022-05-13 | End: 2022-08-07 | Stop reason: ALTCHOICE

## 2022-05-13 NOTE — TELEPHONE ENCOUNTER
Patient returned call and would like a topical medication please send to pharmacy as soon as possible.

## 2022-05-13 NOTE — TELEPHONE ENCOUNTER
Patient called in stating that she has a yeast infection and she states you prescribed her a powder to use and the patient stated she does not think the powder form was a great idea for her to use       Please advise

## 2022-07-05 NOTE — ED NOTES
Paperwork and pt's belongings provided to [facility] staff. Pt escorted to [facility] via two [facility] staff members. Pt cooperative exiting [facility]. no lesions,  no deformities,  no traumatic injuries,  no significant scars are present,  chest wall non-tender,  no masses present, breathing is unlabored without accessory muscle use,normal breath sounds

## 2022-07-12 ENCOUNTER — HOSPITAL ENCOUNTER (OUTPATIENT)
Age: 52
Setting detail: SPECIMEN
Discharge: HOME | End: 2022-07-12

## 2022-07-12 ENCOUNTER — OFFICE VISIT (OUTPATIENT)
Dept: FAMILY MEDICINE CLINIC | Age: 52
End: 2022-07-12
Payer: COMMERCIAL

## 2022-07-12 VITALS
HEART RATE: 77 BPM | WEIGHT: 293 LBS | BODY MASS INDEX: 49.87 KG/M2 | SYSTOLIC BLOOD PRESSURE: 130 MMHG | DIASTOLIC BLOOD PRESSURE: 80 MMHG | OXYGEN SATURATION: 96 %

## 2022-07-12 DIAGNOSIS — R30.0 BURNING WITH URINATION: ICD-10-CM

## 2022-07-12 DIAGNOSIS — R30.0 BURNING WITH URINATION: Primary | ICD-10-CM

## 2022-07-12 DIAGNOSIS — F41.1 GENERALIZED ANXIETY DISORDER: ICD-10-CM

## 2022-07-12 LAB
APPEARANCE FLUID: ABNORMAL
BILIRUBIN, POC: NEGATIVE
BLOOD URINE, POC: ABNORMAL
CLARITY, POC: ABNORMAL
COLOR, POC: YELLOW
GLUCOSE URINE, POC: NEGATIVE
KETONES, POC: NEGATIVE
LEUKOCYTE EST, POC: ABNORMAL
NITRITE, POC: NEGATIVE
PH, POC: 5.5
PROTEIN, POC: NEGATIVE
SPECIFIC GRAVITY, POC: 1.02
UROBILINOGEN, POC: 0.2

## 2022-07-12 PROCEDURE — G8427 DOCREV CUR MEDS BY ELIG CLIN: HCPCS | Performed by: FAMILY MEDICINE

## 2022-07-12 PROCEDURE — 99213 OFFICE O/P EST LOW 20 MIN: CPT | Performed by: FAMILY MEDICINE

## 2022-07-12 PROCEDURE — 81002 URINALYSIS NONAUTO W/O SCOPE: CPT | Performed by: FAMILY MEDICINE

## 2022-07-12 PROCEDURE — G8417 CALC BMI ABV UP PARAM F/U: HCPCS | Performed by: FAMILY MEDICINE

## 2022-07-12 PROCEDURE — 3017F COLORECTAL CA SCREEN DOC REV: CPT | Performed by: FAMILY MEDICINE

## 2022-07-12 PROCEDURE — 1036F TOBACCO NON-USER: CPT | Performed by: FAMILY MEDICINE

## 2022-07-12 RX ORDER — NITROFURANTOIN 25; 75 MG/1; MG/1
100 CAPSULE ORAL 2 TIMES DAILY
Qty: 10 CAPSULE | Refills: 0 | Status: SHIPPED | OUTPATIENT
Start: 2022-07-12 | End: 2022-07-17

## 2022-07-12 RX ORDER — CLONAZEPAM 0.5 MG/1
0.5 TABLET ORAL DAILY PRN
Qty: 30 TABLET | Refills: 0 | Status: SHIPPED | OUTPATIENT
Start: 2022-07-12 | End: 2022-10-18

## 2022-07-12 ASSESSMENT — PATIENT HEALTH QUESTIONNAIRE - PHQ9
SUM OF ALL RESPONSES TO PHQ QUESTIONS 1-9: 1
1. LITTLE INTEREST OR PLEASURE IN DOING THINGS: 0
2. FEELING DOWN, DEPRESSED OR HOPELESS: 0
SUM OF ALL RESPONSES TO PHQ9 QUESTIONS 1 & 2: 0
5. POOR APPETITE OR OVEREATING: 0
4. FEELING TIRED OR HAVING LITTLE ENERGY: 1
7. TROUBLE CONCENTRATING ON THINGS, SUCH AS READING THE NEWSPAPER OR WATCHING TELEVISION: 0
8. MOVING OR SPEAKING SO SLOWLY THAT OTHER PEOPLE COULD HAVE NOTICED. OR THE OPPOSITE, BEING SO FIGETY OR RESTLESS THAT YOU HAVE BEEN MOVING AROUND A LOT MORE THAN USUAL: 0
3. TROUBLE FALLING OR STAYING ASLEEP: 0
SUM OF ALL RESPONSES TO PHQ QUESTIONS 1-9: 1
6. FEELING BAD ABOUT YOURSELF - OR THAT YOU ARE A FAILURE OR HAVE LET YOURSELF OR YOUR FAMILY DOWN: 0
10. IF YOU CHECKED OFF ANY PROBLEMS, HOW DIFFICULT HAVE THESE PROBLEMS MADE IT FOR YOU TO DO YOUR WORK, TAKE CARE OF THINGS AT HOME, OR GET ALONG WITH OTHER PEOPLE: 0
SUM OF ALL RESPONSES TO PHQ QUESTIONS 1-9: 1
SUM OF ALL RESPONSES TO PHQ QUESTIONS 1-9: 1
9. THOUGHTS THAT YOU WOULD BE BETTER OFF DEAD, OR OF HURTING YOURSELF: 0

## 2022-07-12 NOTE — PROGRESS NOTES
[address] [place] [address] [facility] [address]  Dept: [phone]    7/12/2022    CHIEF COMPLAINT    Chief Complaint   Patient presents with    Dysuria     5 or 6 days       HPI    Nkechi Adams is a 45 y.o. female who presents   Chief Complaint   Patient presents with    Dysuria     5 or 6 days   . sx of dysuria with urination for 2-3 days. Denies flank pain or fever. Taking clonazepam less than daily for anxiety. Would like a refill. Seeing mental health provider for bipolar medications. Vitals:    07/12/22 1004   BP: 130/80   Pulse: 77   SpO2: 96%   Weight: (!) 309 lb (140.2 kg)       REVIEW OF SYSTEMS    Review of Systems   Genitourinary: Positive for dysuria. Negative for flank pain, frequency and urgency.        PAST MEDICAL HISTORY    Past Medical History:   Diagnosis Date    [facility]     Depressed     H/O: hysterectomy 09/22/2020    prolapsed uterus    Obesity        FAMILY HISTORY    Family History   Problem Relation Age of Onset    Arthritis Mother     Hypertension Mother     Coronary Art Dis Father     Cancer Sister         rectal       SOCIAL HISTORY    Social History     Socioeconomic History    Marital status:      Spouse name: Dr. Aravind Warner Number of children: 2    Years of education: None    Highest education level: None   Occupational History    None   Tobacco Use    Smoking status: Never Smoker    Smokeless tobacco: Never Used   Substance and Sexual Activity    Alcohol use: Never    Drug use: Not Currently    Sexual activity: None   Other Topics Concern    None   Social History Narrative    None     Social Determinants of Health     Financial Resource Strain: Low Risk     Difficulty of Paying Living Expenses: Not hard at all   Food Insecurity: No Food Insecurity    Worried About [address] in the Last Year: Never true    [address] in the Last Year: Never true   Transportation Needs:     Lack of Transportation (Medical): Not on file    Lack of Transportation (Non-Medical): Not on file   Physical Activity:     Days of Exercise per Week: Not on file    Minutes of Exercise per Session: Not on file   Stress:     Feeling of Stress : Not on file   Social Connections:     Frequency of Communication with Friends and Family: Not on file    Frequency of Social Gatherings with Friends and Family: Not on file    Attends [employer] Services: Not on file    Active Member of [address] [place] [address] [employer] or Organizations: Not on file    Attends Club or Organization Meetings: Not on file    Marital Status: Not on file   Intimate Partner Violence:     Fear of Current or Ex-Partner: Not on file    Emotionally Abused: Not on file    Physically Abused: Not on file    Sexually Abused: Not on file   Housing Stability:     Unable to Pay for Housing in the Last Year: Not on file    Number of [place] in the Last Year: Not on file    Unstable Housing in the Last Year: Not on file       SURGICAL HISTORY    Past Surgical History:   Procedure Laterality Date    APPENDECTOMY      CHOLECYSTECTOMY      HERNIA REPAIR      HYSTERECTOMY (CERVIX STATUS UNKNOWN)      for bleeding    LAPAROSCOPY         CURRENT MEDICATIONS    Current Outpatient Medications   Medication Sig Dispense Refill    clonazePAM (KLONOPIN) 0.5 MG tablet Take 1 tablet by mouth daily as needed for Anxiety for up to 30 days.  30 tablet 0    nitrofurantoin, macrocrystal-monohydrate, (MACROBID) 100 MG capsule Take 1 capsule by mouth 2 times daily for 5 days 10 capsule 0    ketoconazole (NIZORAL) 2 % cream Apply topically daily for 2 weeks 60 g 1    halobetasol (ULTRAVATE) 0.05 % cream Apply topically 2 times daily 50 g 1    OLANZapine (ZYPREXA) 15 MG tablet Take 1 tablet by mouth daily      vitamin D (ERGOCALCIFEROL) 1.25 MG (86592 UT) CAPS capsule Take 1 capsule by mouth Twice a week      escitalopram (LEXAPRO) 10 MG tablet Take 10 mg by mouth daily      traZODone (DESYREL) 50 MG tablet Take 1 tablet by mouth nightly as needed for Sleep 14 tablet 0     No current facility-administered medications for this visit. ALLERGIES    Allergies   Allergen Reactions    Strawberry Extract        PHYSICAL EXAM   Physical Exam  Vitals reviewed. Constitutional:       Appearance: She is obese. Abdominal:      Tenderness: There is no abdominal tenderness. There is no right CVA tenderness or left CVA tenderness. ASSESSMENT/PLAN  1. Burning with urination  UA and culture. Adjust antibiotic if indicated after culture results. Increase fluid intake. - POCT Urinalysis no Micro    - nitrofurantoin, macrocrystal-monohydrate, (MACROBID) 100 MG capsule; Take 1 capsule by mouth 2 times daily for 5 days  Dispense: 10 capsule; Refill: 0    2. Generalized anxiety disorder  Refill clonazepam.  Continue following with mental health provider  - clonazePAM (KLONOPIN) 0.5 MG tablet; Take 1 tablet by mouth daily as needed for Anxiety for up to 30 days. Dispense: 30 tablet; Refill: 0       Vy received counseling on the following healthy behaviors: medication adherence,   Reviewed prior labs and health maintenance. Continue current medications, diet and exercise. Discussed use, benefit, and side effects of prescribed medications. Barriers to medication compliance addressed. Patient given educational materials - see patient instructions. All patient questions answered. Patient voiced understanding. Return if symptoms worsen or fail to improve.         Electronically signed by Maxim Martínez MD on 7/12/22 at 10:07 AM EDT

## 2022-07-14 LAB
CULTURE: ABNORMAL
SPECIMEN DESCRIPTION: ABNORMAL

## 2022-07-18 ENCOUNTER — OFFICE VISIT (OUTPATIENT)
Dept: FAMILY MEDICINE CLINIC | Age: 52
End: 2022-07-18
Payer: COMMERCIAL

## 2022-07-18 VITALS
HEART RATE: 67 BPM | WEIGHT: 293 LBS | RESPIRATION RATE: 18 BRPM | SYSTOLIC BLOOD PRESSURE: 132 MMHG | OXYGEN SATURATION: 98 % | DIASTOLIC BLOOD PRESSURE: 80 MMHG | BODY MASS INDEX: 47.09 KG/M2 | HEIGHT: 66 IN | TEMPERATURE: 97.6 F

## 2022-07-18 DIAGNOSIS — M79.652 PAIN IN BOTH THIGHS: Primary | ICD-10-CM

## 2022-07-18 DIAGNOSIS — N81.89 OTHER FEMALE GENITAL PROLAPSE: ICD-10-CM

## 2022-07-18 DIAGNOSIS — M79.651 PAIN IN BOTH THIGHS: Primary | ICD-10-CM

## 2022-07-18 PROBLEM — S52.501A CLOSED FRACTURE OF RIGHT DISTAL RADIUS: Status: ACTIVE | Noted: 2021-12-16

## 2022-07-18 PROCEDURE — G8427 DOCREV CUR MEDS BY ELIG CLIN: HCPCS | Performed by: NURSE PRACTITIONER

## 2022-07-18 PROCEDURE — G8417 CALC BMI ABV UP PARAM F/U: HCPCS | Performed by: NURSE PRACTITIONER

## 2022-07-18 PROCEDURE — 99214 OFFICE O/P EST MOD 30 MIN: CPT | Performed by: NURSE PRACTITIONER

## 2022-07-18 PROCEDURE — 1036F TOBACCO NON-USER: CPT | Performed by: NURSE PRACTITIONER

## 2022-07-18 PROCEDURE — 3017F COLORECTAL CA SCREEN DOC REV: CPT | Performed by: NURSE PRACTITIONER

## 2022-07-18 SDOH — ECONOMIC STABILITY: FOOD INSECURITY: WITHIN THE PAST 12 MONTHS, YOU WORRIED THAT YOUR FOOD WOULD RUN OUT BEFORE YOU GOT MONEY TO BUY MORE.: NEVER TRUE

## 2022-07-18 SDOH — ECONOMIC STABILITY: FOOD INSECURITY: WITHIN THE PAST 12 MONTHS, THE FOOD YOU BOUGHT JUST DIDN'T LAST AND YOU DIDN'T HAVE MONEY TO GET MORE.: NEVER TRUE

## 2022-07-18 ASSESSMENT — PATIENT HEALTH QUESTIONNAIRE - PHQ9
9. THOUGHTS THAT YOU WOULD BE BETTER OFF DEAD, OR OF HURTING YOURSELF: 0
3. TROUBLE FALLING OR STAYING ASLEEP: 3
SUM OF ALL RESPONSES TO PHQ QUESTIONS 1-9: 4
2. FEELING DOWN, DEPRESSED OR HOPELESS: 0
6. FEELING BAD ABOUT YOURSELF - OR THAT YOU ARE A FAILURE OR HAVE LET YOURSELF OR YOUR FAMILY DOWN: 0
10. IF YOU CHECKED OFF ANY PROBLEMS, HOW DIFFICULT HAVE THESE PROBLEMS MADE IT FOR YOU TO DO YOUR WORK, TAKE CARE OF THINGS AT HOME, OR GET ALONG WITH OTHER PEOPLE: 1
SUM OF ALL RESPONSES TO PHQ QUESTIONS 1-9: 4
8. MOVING OR SPEAKING SO SLOWLY THAT OTHER PEOPLE COULD HAVE NOTICED. OR THE OPPOSITE, BEING SO FIGETY OR RESTLESS THAT YOU HAVE BEEN MOVING AROUND A LOT MORE THAN USUAL: 0
SUM OF ALL RESPONSES TO PHQ9 QUESTIONS 1 & 2: 0
SUM OF ALL RESPONSES TO PHQ QUESTIONS 1-9: 4
7. TROUBLE CONCENTRATING ON THINGS, SUCH AS READING THE NEWSPAPER OR WATCHING TELEVISION: 0
4. FEELING TIRED OR HAVING LITTLE ENERGY: 1
SUM OF ALL RESPONSES TO PHQ QUESTIONS 1-9: 4
1. LITTLE INTEREST OR PLEASURE IN DOING THINGS: 0
5. POOR APPETITE OR OVEREATING: 0

## 2022-07-18 ASSESSMENT — SOCIAL DETERMINANTS OF HEALTH (SDOH): HOW HARD IS IT FOR YOU TO PAY FOR THE VERY BASICS LIKE FOOD, HOUSING, MEDICAL CARE, AND HEATING?: NOT HARD AT ALL

## 2022-07-18 ASSESSMENT — ENCOUNTER SYMPTOMS: EYES NEGATIVE: 1

## 2022-07-18 NOTE — PROGRESS NOTES
Depression screening done  Financial resource strain done  Chief Complaint   Patient presents with    Leg Pain     Bi-Lateral upper thigh area for over 30 years.  Comes and goes, not a 7 day,  24 hour pain

## 2022-07-18 NOTE — PROGRESS NOTES
[address] [place] [address] [facility] [address]  Dept: [phone]    7/18/2022    CHIEF COMPLAINT    Chief Complaint   Patient presents with    Leg Pain     Bi-Lateral upper thigh area for over 30 years. Comes and goes, not a 7 day,  24 hour pain       HPI    Nkechi Adams is a 45 y.o. female who presents   Chief Complaint   Patient presents with    Leg Pain     Bi-Lateral upper thigh area for over 30 years. Comes and goes, not a 7 day,  24 hour pain     Appointment to discuss leg pain. Pain began in her early 20's she first noted pain. It went away in her later 25s to early 29's and reoccurred recently. She has been taking 2 motrin and 1 tylenol lately which has been effective. She states that it initially hurt in May this year. Pain is in her upper anterior thighs. Comes and goes mostly at night. She has never noticed it during the day. She does not feel it's related to activity. Denies numbness or tingling. Describes pain as burning pain. In may it was occurring daily for 5-6 days and then went away. Continues to occur intermittently, but not since May when this appointment was made. She does not feel she did anything differently in May     Concerns for difficulty with sex. She believes that she has a prolapse of some variety.  History of hysterectomy with Dr. Harsha Farmer in the past. Has not seen urology in the past.        Vitals:    07/18/22 1445   BP: 132/80   Site: Left Upper Arm   Position: Sitting   Cuff Size: Large Adult   Pulse: 67   Resp: 18   Temp: 97.6 °F (36.4 °C)   TempSrc: Temporal   SpO2: 98%   Weight: (!) 305 lb (138.3 kg)   Height: 5' 6\" (1.676 m)       Wt Readings from Last 3 Encounters:   07/18/22 (!) 305 lb (138.3 kg)   07/12/22 (!) 309 lb (140.2 kg)   05/12/22 (!) 309 lb 3.2 oz (140.3 kg)     BP Readings from Last 3 Encounters:   07/18/22 132/80   07/12/22 130/80   05/12/22 124/74       REVIEW OF SYSTEMS Review of Systems   Constitutional: Negative. Negative for chills, fatigue and fever. Eyes: Negative. Negative for visual disturbance. Cardiovascular: Negative. Genitourinary:         See HPI    Musculoskeletal:  Positive for myalgias. See HPI    Skin:  Negative for rash and wound. Neurological: Negative. Negative for dizziness and headaches. PAST MEDICAL HISTORY    Past Medical History:   Diagnosis Date    Bipolar 1 disorder ([facility]     Depressed     H/O: hysterectomy 09/22/2020    prolapsed uterus    Obesity        FAMILY HISTORY    Family History   Problem Relation Age of Onset    Arthritis Mother     Hypertension Mother     Coronary Art Dis Father     Cancer Sister         rectal       SOCIAL HISTORY    Social History     Socioeconomic History    Marital status:      Spouse name: Dr. Kacey Thomason    Number of children: 2    Years of education: None    Highest education level: None   Tobacco Use    Smoking status: Never    Smokeless tobacco: Never   Substance and Sexual Activity    Alcohol use: Never    Drug use: Not Currently     Social Determinants of Health     Financial Resource Strain: Low Risk     Difficulty of Paying Living Expenses: Not hard at all   Food Insecurity: No Food Insecurity    Worried About [address] in the Last Year: Never true    Ran Out of Food in the Last Year: Never true   Transportation Needs: No Transportation Needs    Lack of Transportation (Medical): No    Lack of Transportation (Non-Medical): No       SURGICAL HISTORY    Past Surgical History:   Procedure Laterality Date    APPENDECTOMY      CHOLECYSTECTOMY      HERNIA REPAIR      HYSTERECTOMY (CERVIX STATUS UNKNOWN)      for bleeding    LAPAROSCOPY         CURRENT MEDICATIONS    Current Outpatient Medications   Medication Sig Dispense Refill    clonazePAM (KLONOPIN) 0.5 MG tablet Take 1 tablet by mouth daily as needed for Anxiety for up to 30 days.  30 tablet 0    halobetasol (ULTRAVATE) 0.05 % cream Apply topically 2 times daily 50 g 1    OLANZapine (ZYPREXA) 15 MG tablet Take 1 tablet by mouth daily      vitamin D (ERGOCALCIFEROL) 1.25 MG (89542 UT) CAPS capsule Take 1 capsule by mouth Twice a week      escitalopram (LEXAPRO) 10 MG tablet Take 10 mg by mouth daily      traZODone (DESYREL) 50 MG tablet Take 1 tablet by mouth nightly as needed for Sleep 14 tablet 0     No current facility-administered medications for this visit. ALLERGIES    Allergies   Allergen Reactions    Strawberry Extract        PHYSICAL EXAM   Physical Exam  Vitals and nursing note reviewed. Constitutional:       General: She is not in acute distress. Appearance: She is well-developed. She is obese. She is not diaphoretic. Interventions: Face mask in place. HENT:      Head: Normocephalic. Right Ear: Hearing normal.      Left Ear: Hearing normal.   Eyes:      General:         Right eye: No discharge. Left eye: No discharge. Pupils: Pupils are equal.   Cardiovascular:      Rate and Rhythm: Normal rate and regular rhythm. Pulses: Normal pulses. Radial pulses are 2+ on the right side and 2+ on the left side. Heart sounds: Normal heart sounds, S1 normal and S2 normal. No murmur heard. Pulmonary:      Effort: Pulmonary effort is normal. No respiratory distress. Breath sounds: Normal breath sounds. No wheezing. Musculoskeletal:      Cervical back: Normal range of motion. Skin:     General: Skin is warm and dry. Neurological:      Mental Status: She is alert and oriented to person, place, and time. Psychiatric:         Behavior: Behavior normal. Behavior is cooperative. Deferred GYN exam as patient is established with gynecology. ASSESSMENT/PLAN  1. Pain in both thighs  2. Other female genital prolapse     Extensive discussion and history taken from patient.   Given that patient has run out of her magnesium around the time that her pain began she was advised to resume magnesium as pain could be related to stopping the supplement. Return to Dr. Carlos Garner regarding possible prolapse. Michelle Tyler received counseling on the following healthy behaviors: nutrition, exercise, and medication adherence  Reviewed prior labs and health maintenance. Continue current medications, diet and exercise. Discussed use, benefit, and side effects of prescribed medications. Barriers to medication compliance addressed. Patient given educational materials - see patient instructions. All patient questions answered. Patient voiced understanding. Return if symptoms worsen or fail to improve.     (Please note that portions of this note were completed with a voice recognition program. Efforts were made to edit the dictations but occasionally words are mis-transcribed.)      Electronically signed by RAFAELA Timmons CNP on 7/18/22 at 3:27 PM EDT

## 2022-07-19 ENCOUNTER — TELEPHONE (OUTPATIENT)
Dept: FAMILY MEDICINE CLINIC | Age: 52
End: 2022-07-19

## 2022-07-19 NOTE — TELEPHONE ENCOUNTER
[facility]. Inform patient.  she should not need a referral.

## 2022-07-19 NOTE — TELEPHONE ENCOUNTER
Patient called the office on 7/19/22 and stated that Dr. Elian Tuttle has retired. Patient would like recommendations for another doctor.   Please advise

## 2022-09-20 ENCOUNTER — TELEPHONE (OUTPATIENT)
Dept: OBGYN CLINIC | Age: 52
End: 2022-09-20

## 2022-09-20 ENCOUNTER — HOSPITAL ENCOUNTER (OUTPATIENT)
Age: 52
Setting detail: SPECIMEN
Discharge: HOME | End: 2022-09-20

## 2022-09-20 ENCOUNTER — OFFICE VISIT (OUTPATIENT)
Dept: OBGYN CLINIC | Age: 52
End: 2022-09-20
Payer: COMMERCIAL

## 2022-09-20 VITALS
WEIGHT: 293 LBS | BODY MASS INDEX: 47.09 KG/M2 | HEIGHT: 66 IN | SYSTOLIC BLOOD PRESSURE: 132 MMHG | DIASTOLIC BLOOD PRESSURE: 78 MMHG

## 2022-09-20 DIAGNOSIS — N64.4 NIPPLE PAIN: ICD-10-CM

## 2022-09-20 DIAGNOSIS — R82.90 MALODOROUS URINE: ICD-10-CM

## 2022-09-20 DIAGNOSIS — Z01.419 ENCOUNTER FOR GYNECOLOGICAL EXAMINATION: Primary | ICD-10-CM

## 2022-09-20 DIAGNOSIS — Z12.31 ENCOUNTER FOR SCREENING MAMMOGRAM FOR BREAST CANCER: ICD-10-CM

## 2022-09-20 DIAGNOSIS — N64.4 BREAST PAIN IN FEMALE: ICD-10-CM

## 2022-09-20 LAB
BILIRUBIN URINE: NEGATIVE
CASTS UA: NORMAL /LPF (ref 0–8)
COLOR: ABNORMAL
EPITHELIAL CELLS UA: NORMAL /HPF (ref 0–5)
GLUCOSE URINE: NEGATIVE
KETONES, URINE: NEGATIVE
LEUKOCYTE ESTERASE, URINE: ABNORMAL
NITRITE, URINE: NEGATIVE
PH UA: 6 (ref 5–8)
PROTEIN UA: NEGATIVE
RBC UA: NORMAL /HPF (ref 0–4)
SPECIFIC GRAVITY UA: 1.03 (ref 1–1.03)
TURBIDITY: CLEAR
URINE HGB: NEGATIVE
UROBILINOGEN, URINE: NORMAL
WBC UA: NORMAL /HPF (ref 0–5)

## 2022-09-20 PROCEDURE — 99214 OFFICE O/P EST MOD 30 MIN: CPT

## 2022-09-20 PROCEDURE — 1036F TOBACCO NON-USER: CPT

## 2022-09-20 PROCEDURE — 3017F COLORECTAL CA SCREEN DOC REV: CPT

## 2022-09-20 PROCEDURE — G8428 CUR MEDS NOT DOCUMENT: HCPCS

## 2022-09-20 PROCEDURE — G8417 CALC BMI ABV UP PARAM F/U: HCPCS

## 2022-09-20 NOTE — TELEPHONE ENCOUNTER
FYI- Pt was scheduled for  first available on 10/31/2022 for pessary fitting w/Dr. Kaylee Velazquez.

## 2022-09-20 NOTE — PROGRESS NOTES
[address] [facility] - [address] [address] [facility]  Dept: [phone]    DATE OF VISIT:  22  PCP: Goldie Bob MD     CHIEF COMPLAINT:    Chief Complaint   Patient presents with    New Patient    Annual Exam     Prev Pap: Hyst 2017; Prev Jenny: 20 WNL     Other     Wants to be treated for a prolapse    Breast Pain     Bilateral breast tenderness x2 months, comes and goes      HPI :   Palak Houston is a 45 y.o.  female here to establish care and for evaluation of mutliple concerns. She has a history of uterine prolapse and cystocele s/p DaVinci hysterectomy and anterior repair with Dr Estevan Padilla in 2017. She notes improvement following that surgery. However, she is now reporting bothersome pelvic floor prolapse. Denies pelvic pain or bothersome urinary concerns. However, she notes her pelvic floor prolapse is interfering with her sex life. Declined to go into further detail and declined a pelvic exam today. She states she lost a lot of weight in 2018 but gained around 100 lb approx in past 2 years. She believes this weight gain has contributed to worsening prolapse. Discussed weight management clinic at [address] [place] [address]. Not currently seeing urologist    She is interested in returning at a later date for a possible pessary fitting with another provider. We briefly discussed pessary use. Navdeep Kimbrough has not attended pelvic floor pt previously. She is reporting bilateral nipple sensitivity and bilateral diffuse breast pain noted in the past 2-4 months. Notes 2-3 new dark red/purple moles on the left breast.     She does have a dermatologist but has not had a skin check in approx. 3-4 years.      Past Medical History:   Diagnosis Date    Bipolar 1 disorder ([address]     Depressed     H/O: hysterectomy 2020    prolapsed uterus    Obesity       Past Surgical History:   Procedure Laterality Date    APPENDECTOMY CHOLECYSTECTOMY      HERNIA REPAIR      HYSTERECTOMY (CERVIX STATUS UNKNOWN)      for bleeding    LAPAROSCOPY       Family History   Problem Relation Age of Onset    Arthritis Mother     Hypertension Mother     Coronary Art Dis Father     Cancer Sister         rectal     Social History     Tobacco Use   Smoking Status Never   Smokeless Tobacco Never     Social History     Substance and Sexual Activity   Alcohol Use Yes     Current Outpatient Medications   Medication Sig Dispense Refill    OLANZapine (ZYPREXA) 15 MG tablet Take 1 tablet by mouth daily      escitalopram (LEXAPRO) 10 MG tablet Take 10 mg by mouth daily      traZODone (DESYREL) 50 MG tablet Take 1 tablet by mouth nightly as needed for Sleep 14 tablet 0    clonazePAM (KLONOPIN) 0.5 MG tablet Take 1 tablet by mouth daily as needed for Anxiety for up to 30 days. 30 tablet 0    halobetasol (ULTRAVATE) 0.05 % cream Apply topically 2 times daily 50 g 1    vitamin D (ERGOCALCIFEROL) 1.25 MG (87221 UT) CAPS capsule Take 1 capsule by mouth Twice a week       No current facility-administered medications for this visit. Allergies:  Strawberry extract    Gynecologic History:  No LMP recorded. Patient is postmenopausal.  Sexually Active: Yes    OB History    Para Term  AB Living   2 2 2 0 0 0   SAB IAB Ectopic Molar Multiple Live Births   0 0 0 0 0 0     Review of Systems   Constitutional:  Negative for chills, fatigue and fever. Genitourinary:  Positive for difficulty urinating (sometimes has to apply bladder pressure to finish emptying). Negative for decreased urine volume, dyspareunia, dysuria, frequency, genital sores, hematuria, menstrual problem, pelvic pain, urgency, vaginal bleeding, vaginal discharge and vaginal pain. Bilateral breast pain   Skin:  Color change: new moles left breast.   All other systems reviewed and are negative.     /78 (Position: Sitting, Cuff Size: Large Adult)   Ht 5' 6\" (1.676 m)   Wt (!) 311 lb (141.1 kg)   BMI 50.20 kg/m²     Physical Exam  Constitutional:       General: She is awake. She is not in acute distress. Appearance: Normal appearance. She is well-developed and well-groomed. She is not ill-appearing, toxic-appearing or diaphoretic. Genitourinary:      Vulva exam comments: Declined pelvic exam today. Breasts:     Right: No inverted nipple. Left: Inverted nipple and skin change (three dark red/purple approx 3-4 mm in size moles on left breast) present. HENT:      Head: Normocephalic and atraumatic. Nose: Nose normal.   Eyes:      Extraocular Movements: Extraocular movements intact. Pulmonary:      Effort: Pulmonary effort is normal.   Musculoskeletal:         General: Normal range of motion. Cervical back: Normal range of motion. Neurological:      General: No focal deficit present. Mental Status: She is alert and oriented to person, place, and time. Mental status is at baseline. Psychiatric:         Attention and Perception: Attention and perception normal.         Mood and Affect: Mood normal.         Speech: Speech normal.         Behavior: Behavior normal. Behavior is cooperative. Thought Content: Thought content normal.         Cognition and Memory: Cognition and memory normal.         Judgment: Judgment normal.   Vitals reviewed. ASSESSMENT & PLAN:  Opal Salas is a 45 y.o. female. Today we discussed:  1. Encounter for gynecological examination    2. Encounter for screening mammogram for breast cancer    3. Breast pain in female  - YAHIR DIGITAL DIAGNOSTIC W OR WO CAD BILATERAL; Future    4. Nipple pain    5. Malodorous urine  - Culture, Urine; Future  - Urinalysis; Future     Return in about 4 weeks (around 10/18/2022), or pessary fitting with dr Flavio Thompson and jojo. The patient was also counseled on prevenive health maintenance recommendations.      Electronically signed by RAFAELA Hobson CNP on 9/20/2022 at 2:51 PM

## 2022-09-21 LAB
CULTURE: NORMAL
SPECIMEN DESCRIPTION: NORMAL

## 2022-10-18 ENCOUNTER — OFFICE VISIT (OUTPATIENT)
Dept: FAMILY MEDICINE CLINIC | Age: 52
End: 2022-10-18
Payer: COMMERCIAL

## 2022-10-18 VITALS
HEART RATE: 62 BPM | DIASTOLIC BLOOD PRESSURE: 70 MMHG | BODY MASS INDEX: 49.68 KG/M2 | SYSTOLIC BLOOD PRESSURE: 130 MMHG | WEIGHT: 293 LBS

## 2022-10-18 DIAGNOSIS — R06.02 SOB (SHORTNESS OF BREATH): Primary | ICD-10-CM

## 2022-10-18 DIAGNOSIS — F41.1 GENERALIZED ANXIETY DISORDER: ICD-10-CM

## 2022-10-18 DIAGNOSIS — F31.9 BIPOLAR 1 DISORDER (HCC): ICD-10-CM

## 2022-10-18 DIAGNOSIS — R07.89 OTHER CHEST PAIN: ICD-10-CM

## 2022-10-18 DIAGNOSIS — R00.2 HEART PALPITATIONS: ICD-10-CM

## 2022-10-18 DIAGNOSIS — R53.82 CHRONIC FATIGUE: ICD-10-CM

## 2022-10-18 DIAGNOSIS — Z23 NEED FOR INFLUENZA VACCINATION: ICD-10-CM

## 2022-10-18 PROCEDURE — 90674 CCIIV4 VAC NO PRSV 0.5 ML IM: CPT | Performed by: FAMILY MEDICINE

## 2022-10-18 PROCEDURE — 3017F COLORECTAL CA SCREEN DOC REV: CPT | Performed by: FAMILY MEDICINE

## 2022-10-18 PROCEDURE — 1036F TOBACCO NON-USER: CPT | Performed by: FAMILY MEDICINE

## 2022-10-18 PROCEDURE — 99214 OFFICE O/P EST MOD 30 MIN: CPT | Performed by: FAMILY MEDICINE

## 2022-10-18 PROCEDURE — G8427 DOCREV CUR MEDS BY ELIG CLIN: HCPCS | Performed by: FAMILY MEDICINE

## 2022-10-18 PROCEDURE — G8417 CALC BMI ABV UP PARAM F/U: HCPCS | Performed by: FAMILY MEDICINE

## 2022-10-18 PROCEDURE — G8482 FLU IMMUNIZE ORDER/ADMIN: HCPCS | Performed by: FAMILY MEDICINE

## 2022-10-18 PROCEDURE — 90471 IMMUNIZATION ADMIN: CPT | Performed by: FAMILY MEDICINE

## 2022-10-18 ASSESSMENT — ENCOUNTER SYMPTOMS
SHORTNESS OF BREATH: 1
COUGH: 0
CONSTIPATION: 0
ALLERGIC/IMMUNOLOGIC NEGATIVE: 1
DIARRHEA: 0
EYES NEGATIVE: 1
ABDOMINAL PAIN: 0
BLOOD IN STOOL: 0

## 2022-10-18 NOTE — PROGRESS NOTES
[address] [place] [address] [facility] [address]  Dept: [phone]    10/18/2022    CHIEF COMPLAINT    Chief Complaint   Patient presents with    Palpitations       HPI    Bo Vivas is a 45 y.o. female who presents   Chief Complaint   Patient presents with    Palpitations   . Had an episode of left sided chest pain that lasted a few seconds 3-4 days ago. Was standing but not exerting herself. Sx occurred a few times. Hx of normal ekg done in the office in 2021. No prior cardiac testing. Seeing psychiatrist for hx of bipolar disorder. Hx of palpitations a few times a week for the past 6 months. Vitals:    10/18/22 1124   BP: 130/70   Pulse: 62   Weight: (!) 307 lb 12.8 oz (139.6 kg)       REVIEW OF SYSTEMS    Review of Systems   Constitutional:  Positive for fatigue. Negative for fever and unexpected weight change. HENT: Negative. Eyes: Negative. Respiratory:  Positive for shortness of breath. Negative for cough. Cardiovascular:  Positive for chest pain. Negative for leg swelling. Gastrointestinal:  Negative for abdominal pain, blood in stool, constipation and diarrhea. Endocrine: Negative. Genitourinary:  Negative for frequency and urgency. Musculoskeletal: Negative. Skin: Negative. Allergic/Immunologic: Negative. Neurological:  Negative for dizziness and headaches. Hematological: Negative. Psychiatric/Behavioral:  Positive for dysphoric mood. Negative for sleep disturbance. The patient is nervous/anxious.          Mood stable on meds     PAST MEDICAL HISTORY    Past Medical History:   Diagnosis Date    B[facility] 1 disorder [facility]     Depressed     H/O: hysterectomy 09/22/2020    prolapsed uterus    Obesity        FAMILY HISTORY    Family History   Problem Relation Age of Onset    Arthritis Mother     Hypertension Mother     Coronary Art Dis Father     Cancer Sister         rectal       SOCIAL HISTORY    Social History     Socioeconomic History    Marital status:      Spouse name: Dr. Estephania Lou    Number of children: 2    Years of education: None    Highest education level: None   Tobacco Use    Smoking status: Never    Smokeless tobacco: Never   Vaping Use    Vaping Use: Never used   Substance and Sexual Activity    Alcohol use: Yes     Comment: social    Drug use: Not Currently    Sexual activity: Yes     Social Determinants of Health     Financial Resource Strain: Low Risk     Difficulty of Paying Living Expenses: Not hard at all   Food Insecurity: No Food Insecurity    Worried About [address] [employer] in the Last Year: Never true    [employer] in the Last Year: Never true   Transportation Needs: No Transportation Needs    Lack of Transportation (Medical): No    Lack of Transportation (Non-Medical): No       SURGICAL HISTORY    Past Surgical History:   Procedure Laterality Date    APPENDECTOMY      CHOLECYSTECTOMY      HERNIA REPAIR      HYSTERECTOMY (CERVIX STATUS UNKNOWN)      for bleeding    LAPAROSCOPY         CURRENT MEDICATIONS    Current Outpatient Medications   Medication Sig Dispense Refill    clonazePAM (KLONOPIN) 0.5 MG tablet Take 1 tablet by mouth daily as needed for Anxiety for up to 30 days. 30 tablet 0    halobetasol (ULTRAVATE) 0.05 % cream Apply topically 2 times daily 50 g 1    vitamin D (ERGOCALCIFEROL) 1.25 MG (04477 UT) CAPS capsule Take 1 capsule by mouth Twice a week      escitalopram (LEXAPRO) 10 MG tablet Take 10 mg by mouth daily      traZODone (DESYREL) 50 MG tablet Take 1 tablet by mouth nightly as needed for Sleep 14 tablet 0    OLANZapine (ZYPREXA) 15 MG tablet Take 1 tablet by mouth daily       No current facility-administered medications for this visit. ALLERGIES    Allergies   Allergen Reactions    Strawberry Extract        PHYSICAL EXAM   Physical Exam  Vitals reviewed. Constitutional:       Appearance: She is well-developed.    HENT:      Head: Normocephalic. Eyes:      Pupils: Pupils are equal, round, and reactive to light. Neck:      Thyroid: No thyromegaly. Cardiovascular:      Rate and Rhythm: Normal rate and regular rhythm. Heart sounds: Normal heart sounds. No murmur heard. Pulmonary:      Effort: Pulmonary effort is normal.      Breath sounds: Normal breath sounds. No wheezing or rales. Abdominal:      General: Distension: epigastric. Palpations: Abdomen is soft. Tenderness: There is abdominal tenderness. There is no guarding or rebound. Musculoskeletal:         General: No tenderness or deformity. Normal range of motion. Cervical back: Normal range of motion and neck supple. Lymphadenopathy:      Cervical: No cervical adenopathy. Skin:     General: Skin is warm and dry. Neurological:      Mental Status: She is alert and oriented to person, place, and time. Psychiatric:         Mood and Affect: Mood normal.         Behavior: Behavior normal.         Thought Content: Thought content normal.         Judgment: Judgment normal.       ASSESSMENT/PLAN  1. SOB (shortness of breath)  Monitor symptoms to correlate with activity or emotions  - ECHO Complete 2D W Doppler W Color; Future    2. Other chest pain  If sustained chest pain occurs go to ED  - ECHO Complete 2D W Doppler W Color; Future    3. Bipolar 1 disorder ([address]  Stable managed by mental health provider    4. Chronic fatigue  Continue trying to follow healthy diet and get regular activity    5. Heart palpitations  Monitor symptoms and report recurrences    6. Generalized anxiety disorder  Cont meds, seeing mental health provider. BODØ received counseling on the following healthy behaviors: nutrition, exercise, and medication adherence  Reviewed prior labs and health maintenance. Continue current medications, diet and exercise. Discussed use, benefit, and side effects of prescribed medications. Barriers to medication compliance addressed.    Patient given educational materials - see patient instructions. All patient questions answered. Patient voiced understanding. Return if symptoms worsen or fail to improve.         Electronically signed by Marcel Higginbotham MD on 10/18/22 at 11:32 AM EDT

## 2022-10-28 NOTE — PROGRESS NOTES
[facility] - [address] [facility] [address]  Dept: [phone]    OB/GYN   Procedure Note        Darin Albrecht   10/31/2022           Johnna Ricardo MD       Subjective:   Darin Albrecht is a 45 y.o. female   here for pessary fitting. She has had progressively worsening pelvic organ prolapse over the past 8-9 months. She denies anything hanging out of the vagina, but she did notice in the shower. She has had prolapse before and had surgery before she thinks when she had her hysterectomy. She had a robotic hyst 5 years ago. Vitals:   Blood pressure 131/79, pulse 70, height 5' 7\" (1.702 m), weight (!) 305 lb (138.3 kg). Procedure: Pessary fitting and sizing     Indications: stage 2 prolapse      Procedure Details: The patient was positioned comfortably on the exam table. After a bi-manual exam; the uterus and prolapse was reduced without difficulty. There was no cervical motion tenderness or adnexal masses and the bladder was smooth, non-tender and without palpable masses. Based on exam, the ring forceps with support pessary was chosen and placed. Patient ambulated and voided without difficulty with it in place. She isn't sure if she would like to take out her pessary and clean it herself or come in every 3 months to have it cleaned, but says she will let me know. Pessary was replaced without any difficulty. T.O.S. Ointment was placed with the pessary to decrease discharge/odor. The patient tolerated the procedure without difficulty.       Assessment & Plan:  Darin Albrecht is a 45 y.o.  with anterior vaginal prolapse              - ring with support pessary #4 placed without difficulty              - patient to return in 1 month for pessary maintenance and evaluation              - call or come in with any difficulty voiding, increased malodorous vaginal discharge, pain or bleeding     Patient Active Problem List   Diagnosis    Schizophrenia ([facility]    Suicidal ideation    Schizoaffective disorder, depressive type ([facility]    Obesity    H/O: hysterectomy    Bipolar 1 disorder ([facility])    Venous hypertension    Varicose veins of bilateral lower extremities with pain    Stress incontinence in female    Pelvic pain    Major depression, single episode    Generalized anxiety disorder    Bunion, right    Acute cholecystitis    Other chest pain    Heart palpitations    Closed fracture of right distal radius    Prolapse of anterior vaginal wall       Flex Ruiz, [address] [facility]  10/31/2022 9:33 AM

## 2022-10-31 ENCOUNTER — PROCEDURE VISIT (OUTPATIENT)
Dept: OBGYN CLINIC | Age: 52
End: 2022-10-31
Payer: COMMERCIAL

## 2022-10-31 VITALS
HEIGHT: 67 IN | DIASTOLIC BLOOD PRESSURE: 79 MMHG | HEART RATE: 70 BPM | BODY MASS INDEX: 45.99 KG/M2 | SYSTOLIC BLOOD PRESSURE: 131 MMHG | WEIGHT: 293 LBS

## 2022-10-31 DIAGNOSIS — N81.10 PROLAPSE OF ANTERIOR VAGINAL WALL: Primary | ICD-10-CM

## 2022-10-31 PROCEDURE — 57160 INSERT PESSARY/OTHER DEVICE: CPT | Performed by: OBSTETRICS & GYNECOLOGY

## 2022-11-21 ENCOUNTER — HOSPITAL ENCOUNTER (EMERGENCY)
Facility: CLINIC | Age: 52
Discharge: HOME | End: 2022-11-22
Attending: EMERGENCY MEDICINE
Payer: COMMERCIAL

## 2022-11-21 VITALS
WEIGHT: 293 LBS | RESPIRATION RATE: 16 BRPM | OXYGEN SATURATION: 95 % | HEIGHT: 67 IN | BODY MASS INDEX: 45.99 KG/M2 | DIASTOLIC BLOOD PRESSURE: 82 MMHG | HEART RATE: 78 BPM | SYSTOLIC BLOOD PRESSURE: 159 MMHG | TEMPERATURE: 98 F

## 2022-11-21 DIAGNOSIS — R06.02 SHORTNESS OF BREATH: Primary | ICD-10-CM

## 2022-11-21 LAB
ABSOLUTE EOS #: 0.3 K/UL (ref 0–0.4)
ABSOLUTE LYMPH #: 2.7 K/UL (ref 1–4.8)
ABSOLUTE MONO #: 0.6 K/UL (ref 0.1–1.2)
ALBUMIN SERPL-MCNC: 4.7 G/DL (ref 3.5–5.2)
ALBUMIN/GLOBULIN RATIO: 2.4 (ref 1–2.5)
ALP BLD-CCNC: 107 U/L (ref 35–104)
ALT SERPL-CCNC: 41 U/L (ref 5–33)
ANION GAP SERPL CALCULATED.3IONS-SCNC: 9 MMOL/L (ref 9–17)
AST SERPL-CCNC: 27 U/L
BASOPHILS # BLD: 1 % (ref 0–2)
BASOPHILS ABSOLUTE: 0.1 K/UL (ref 0–0.2)
BILIRUB SERPL-MCNC: 0.5 MG/DL (ref 0.3–1.2)
BUN BLDV-MCNC: 15 MG/DL (ref 6–20)
CALCIUM SERPL-MCNC: 9.6 MG/DL (ref 8.6–10.4)
CHLORIDE BLD-SCNC: 105 MMOL/L (ref 98–107)
CO2: 27 MMOL/L (ref 20–31)
CREAT SERPL-MCNC: 0.7 MG/DL (ref 0.5–0.9)
D-DIMER QUANTITATIVE: 0.19 MG/L FEU
EOSINOPHILS RELATIVE PERCENT: 3 % (ref 1–4)
GFR SERPL CREATININE-BSD FRML MDRD: >60 ML/MIN/1.73M2
GLUCOSE BLD-MCNC: 109 MG/DL (ref 70–99)
HCT VFR BLD CALC: 43 % (ref 36–46)
HEMOGLOBIN: 14.4 G/DL (ref 12–16)
LIPASE: 26 U/L (ref 13–60)
LYMPHOCYTES # BLD: 35 % (ref 24–44)
MAGNESIUM: 2 MG/DL (ref 1.6–2.6)
MCH RBC QN AUTO: 30.1 PG (ref 26–34)
MCHC RBC AUTO-ENTMCNC: 33.6 G/DL (ref 31–37)
MCV RBC AUTO: 89.6 FL (ref 80–100)
MONOCYTES # BLD: 7 % (ref 2–11)
PDW BLD-RTO: 13.6 % (ref 12.5–15.4)
PLATELET # BLD: 214 K/UL (ref 140–450)
PMV BLD AUTO: 7.8 FL (ref 6–12)
POTASSIUM SERPL-SCNC: 3.8 MMOL/L (ref 3.7–5.3)
PRO-BNP: <20 PG/ML
RBC # BLD: 4.8 M/UL (ref 4–5.2)
SEG NEUTROPHILS: 54 % (ref 36–66)
SEGMENTED NEUTROPHILS ABSOLUTE COUNT: 4.2 K/UL (ref 1.8–7.7)
SODIUM BLD-SCNC: 141 MMOL/L (ref 135–144)
TOTAL PROTEIN: 6.7 G/DL (ref 6.4–8.3)
TROPONIN, HIGH SENSITIVITY: <6 NG/L (ref 0–14)
WBC # BLD: 7.8 K/UL (ref 3.5–11)

## 2022-11-21 PROCEDURE — 80053 COMPREHEN METABOLIC PANEL: CPT

## 2022-11-21 PROCEDURE — 83690 ASSAY OF LIPASE: CPT

## 2022-11-21 PROCEDURE — 84484 ASSAY OF TROPONIN QUANT: CPT

## 2022-11-21 PROCEDURE — 83880 ASSAY OF NATRIURETIC PEPTIDE: CPT

## 2022-11-21 PROCEDURE — 36415 COLL VENOUS BLD VENIPUNCTURE: CPT

## 2022-11-21 PROCEDURE — 85379 FIBRIN DEGRADATION QUANT: CPT

## 2022-11-21 PROCEDURE — 93005 ELECTROCARDIOGRAM TRACING: CPT | Performed by: EMERGENCY MEDICINE

## 2022-11-21 PROCEDURE — 99285 EMERGENCY DEPT VISIT HI MDM: CPT

## 2022-11-21 PROCEDURE — 83735 ASSAY OF MAGNESIUM: CPT

## 2022-11-21 PROCEDURE — 85025 COMPLETE CBC W/AUTO DIFF WBC: CPT

## 2022-11-21 ASSESSMENT — PAIN - FUNCTIONAL ASSESSMENT: PAIN_FUNCTIONAL_ASSESSMENT: NONE - DENIES PAIN

## 2022-11-22 ENCOUNTER — APPOINTMENT (OUTPATIENT)
Dept: GENERAL RADIOLOGY | Facility: CLINIC | Age: 52
End: 2022-11-22
Payer: COMMERCIAL

## 2022-11-22 LAB
EKG ATRIAL RATE: 69 BPM
EKG P AXIS: 57 DEGREES
EKG P-R INTERVAL: 158 MS
EKG Q-T INTERVAL: 384 MS
EKG QRS DURATION: 80 MS
EKG QTC CALCULATION (BAZETT): 411 MS
EKG R AXIS: 8 DEGREES
EKG T AXIS: 34 DEGREES
EKG VENTRICULAR RATE: 69 BPM
TROPONIN, HIGH SENSITIVITY: <6 NG/L (ref 0–14)

## 2022-11-22 PROCEDURE — 71046 X-RAY EXAM CHEST 2 VIEWS: CPT

## 2022-11-22 PROCEDURE — 84484 ASSAY OF TROPONIN QUANT: CPT

## 2022-11-22 ASSESSMENT — ENCOUNTER SYMPTOMS
SHORTNESS OF BREATH: 1
ABDOMINAL PAIN: 0
SORE THROAT: 0
EYE PAIN: 0
VOMITING: 0
COUGH: 0
NAUSEA: 0
DIARRHEA: 0
RHINORRHEA: 0
BACK PAIN: 0

## 2022-11-22 NOTE — ED PROVIDER NOTES
[facility]  [address] [facility]  Phone: [phone] [facility]          Pt Name: Carlos Hyatt  MRN: [id number]  [place] 1970  Date of evaluation: 11/21/2022      CHIEF COMPLAINT       Chief Complaint   Patient presents with    Tachycardia    Dizziness       HISTORY OF PRESENT ILLNESS       Carlos Hyatt is a 45 y.o. female who presents with reported dyspnea on exertion today while getting ready for bed. Did not last for long period she reports her heart rate stayed in the lower 90s for 30 minutes and that is when she decided to come in. She feels back to baseline at this time. No history of PE or DVT. No history of any cardiac pathology. No cough. No other symptoms or concerns. No fevers    REVIEW OF SYSTEMS       Review of Systems   Constitutional:  Negative for chills, fatigue and fever. HENT:  Negative for rhinorrhea and sore throat. Eyes:  Negative for pain. Respiratory:  Positive for shortness of breath. Negative for cough. Cardiovascular:  Negative for chest pain. Gastrointestinal:  Negative for abdominal pain, diarrhea, nausea and vomiting. Genitourinary:  Negative for difficulty urinating. Musculoskeletal:  Negative for back pain and neck pain. Skin:  Negative for rash. Neurological:  Negative for weakness and headaches. PAST MEDICAL HISTORY    has a past medical history of Bipolar 1 disorder ([address] Depressed, H/O: hysterectomy, and Obesity. SURGICAL HISTORY      has a past surgical history that includes hernia repair; Cholecystectomy; Appendectomy; laparoscopy; and Hysterectomy. CURRENT MEDICATIONS       Current Discharge Medication List        CONTINUE these medications which have NOT CHANGED    Details   clonazePAM (KLONOPIN) 0.5 MG tablet Take 1 tablet by mouth daily as needed for Anxiety for up to 30 days.   Qty: 30 tablet, Refills: 0    Associated Diagnoses: Generalized anxiety disorder halobetasol (ULTRAVATE) 0.05 % cream Apply topically 2 times daily  Qty: 50 g, Refills: 1    Associated Diagnoses: Dyshidrotic eczema      OLANZapine (ZYPREXA) 15 MG tablet Take 1 tablet by mouth daily      vitamin D (ERGOCALCIFEROL) 1.25 MG (88251 UT) CAPS capsule Take 1 capsule by mouth Twice a week      escitalopram (LEXAPRO) 10 MG tablet Take 10 mg by mouth daily      traZODone (DESYREL) 50 MG tablet Take 1 tablet by mouth nightly as needed for Sleep  Qty: 14 tablet, Refills: 0             ALLERGIES     is allergic to strawberry extract. FAMILY HISTORY     She indicated that her mother is alive. She indicated that her father is alive. She indicated that both of her sisters are alive. She indicated that both of her daughters are alive. family history includes Arthritis in her mother; Cancer in her sister; Coronary Art Dis in her father; Hypertension in her mother. SOCIAL HISTORY      reports that she has never smoked. She has never used smokeless tobacco. She reports current alcohol use. She reports that she does not currently use drugs. PHYSICAL EXAM     INITIAL VITALS:  height is 5' 6.75\" (1.695 m) and weight is 136.1 kg (300 lb). Her oral temperature is 98 °F (36.7 °C). Her blood pressure is 159/82 (abnormal) and her pulse is 78. Her respiration is 16 and oxygen saturation is 95%. Physical Exam  Vitals reviewed. Constitutional:       General: She is not in acute distress. Appearance: She is well-developed. She is not ill-appearing or toxic-appearing. HENT:      Head: Normocephalic and atraumatic. Right Ear: External ear normal.      Left Ear: External ear normal.   Eyes:      General: Lids are normal.   Neck:      Trachea: No tracheal deviation. Cardiovascular:      Rate and Rhythm: Normal rate and regular rhythm. Pulmonary:      Effort: Pulmonary effort is normal. No respiratory distress. Breath sounds: Normal breath sounds.    Abdominal:      Palpations: Abdomen is soft.      Tenderness: There is no abdominal tenderness. Skin:     General: Skin is warm and dry. Neurological:      Mental Status: She is alert. GCS: GCS eye subscore is 4. GCS verbal subscore is 5. GCS motor subscore is 6. Psychiatric:         Speech: Speech normal.         DIFFERENTIAL DIAGNOSIS/ MDM:     Patient doing well unremarkable physical exam.  Clinically she appears well. Mild hypertension but otherwise normal vital signs. DIAGNOSTIC RESULTS     EKG: All EKG's are interpreted by the Emergency Department Physician who either signs or Co-signs this chart in the absence of a cardiologist.    Interpreted by Severiano Dinning, DO     Rhythm: normal sinus   Rate: normal  Axis: normal  Ectopy: none  Conduction: normal  ST Segments: no acute change  T Waves: no acute change    Clinical Impression: normal sinus rhythm with no acute changes/normal EKG. No acute infarction/ischemia noted. RADIOLOGY:   Interpretation per the Radiologist below, if available at the time of this note:  XR CHEST (2 VW)    (Results Pending)       No results found.     LABS:  Results for orders placed or performed during the hospital encounter of 11/21/22   CBC with Auto Differential   Result Value Ref Range    WBC 7.8 3.5 - 11.0 k/uL    RBC 4.80 4.0 - 5.2 m/uL    Hemoglobin 14.4 12.0 - 16.0 g/dL    Hematocrit 43.0 36 - 46 %    MCV 89.6 80 - 100 fL    MCH 30.1 26 - 34 pg    MCHC 33.6 31 - 37 g/dL    RDW 13.6 12.5 - 15.4 %    Platelets 418 133 - 687 k/uL    MPV 7.8 6.0 - 12.0 fL    Seg Neutrophils 54 36 - 66 %    Lymphocytes 35 24 - 44 %    Monocytes 7 2 - 11 %    Eosinophils % 3 1 - 4 %    Basophils 1 0 - 2 %    Segs Absolute 4.20 1.8 - 7.7 k/uL    Absolute Lymph # 2.70 1.0 - 4.8 k/uL    Absolute Mono # 0.60 0.1 - 1.2 k/uL    Absolute Eos # 0.30 0.0 - 0.4 k/uL    Basophils Absolute 0.10 0.0 - 0.2 k/uL   Comprehensive Metabolic Panel   Result Value Ref Range    Glucose 109 (H) 70 - 99 mg/dL    BUN 15 6 - 20 mg/dL Creatinine 0.70 0.50 - 0.90 mg/dL    Est, Glom Filt Rate >60 >60 mL/min/1.73m2    Calcium 9.6 8.6 - 10.4 mg/dL    Sodium 141 135 - 144 mmol/L    Potassium 3.8 3.7 - 5.3 mmol/L    Chloride 105 98 - 107 mmol/L    CO2 27 20 - 31 mmol/L    Anion Gap 9 9 - 17 mmol/L    Alkaline Phosphatase 107 (H) 35 - 104 U/L    ALT 41 (H) 5 - 33 U/L    AST 27 <32 U/L    Total Bilirubin 0.5 0.3 - 1.2 mg/dL    Total Protein 6.7 6.4 - 8.3 g/dL    Albumin 4.7 3.5 - 5.2 g/dL    Albumin/Globulin Ratio 2.4 1.0 - 2.5   Brain Natriuretic Peptide   Result Value Ref Range    Pro-BNP <20 <300 pg/mL   D-Dimer, Quantitative   Result Value Ref Range    D-Dimer, Quant 0.19 mg/L FEU   Lipase   Result Value Ref Range    Lipase 26 13 - 60 U/L   Magnesium   Result Value Ref Range    Magnesium 2.0 1.6 - 2.6 mg/dL   Troponin   Result Value Ref Range    Troponin, High Sensitivity <6 0 - 14 ng/L   Troponin   Result Value Ref Range    Troponin, High Sensitivity <6 0 - 14 ng/L       EMERGENCY DEPARTMENT COURSE:     The patient was given the following medications:  No orders of the defined types were placed in this encounter. Vitals:    Vitals:    11/21/22 2306   BP: (!) 159/82   Pulse: 78   Resp: 16   Temp: 98 °F (36.7 °C)   TempSrc: Oral   SpO2: 95%   Weight: 136.1 kg (300 lb)   Height: 5' 6.75\" (1.695 m)     -------------------------  BP: (!) 159/82, Temp: 98 °F (36.7 °C), Heart Rate: 78, Resp: 16      Re-evaluation Notes    Patient doing well on reevaluation. Remains asymptomatic. Work-up unremarkable. Both cardiac enzymes negative. Plan to be to discharge patient home. Unclear exactly the cause however I do not feel there is an emergent process at this time. She was advised to follow-up with her PCP return [place] if worse or for new or concerning symptoms. She is comfortable with this plan. The patient presents with dyspnea that is not suggesting in nature of pulmonary embolus, aortic dissection, cardiac ischemia, or other serious etiology. I considered an aortic dissection, but this is unlikely as patient is not complaining of tearing or ripping chest pain that is radiating to the back, the patient has no new neurological abnormalities and pulses are equal to all extremities. Mediastinum is within normal limits. Patient appears comfortable on physical exam and is not in distress. I also thought about a cardiac tamponade, but this is unlikely as patient is hemodynamically stable. Heart sounds are not distant, EKG does not show signs of electrical alternans and there is no JVD. I also thought about a tension pneumothorax, but this is unlikely given bilateral breath sounds and no signs of hemodynamic instability. I do not feel the patient has a PE. No clinical evidence of DVT. I thought about an esophageal perforation, but history and physical exam does not suggest vomiting, followed by chest pain. No signs of Hamman's crunch on physical exam; again, patient appears comfortable and is well appearing and non toxic. The patient has been instructed to return if the symptpoms change or worsen in any way. Given the extremely low risk of these diagnoses further testing and evaluation for these possibilites are not indicated at this time. The patient appears stable for discharge and has been instructed to return immediately if the symptoms worsen in any way, or in 8-12 hr if not improved for re-evaluation. We also discussed returning to the Emergency Department immediately if new or worsening symptoms occur. We have discussed the symptoms which are most concerning (e.g., worsening pain, shortness of breath, a feeling of passing out, fever, any neurologic symptoms, abdominal pain or vomiting) that necessitate immediate return.       The patient understands that at this time there is no evidence for a more malignant underlying process, but the patient also understands that early in the process of an illness or injury, an emergency department workup can be falsely reassuring. Routine discharge counseling was given, and the patient understands that worsening, changing or persistent symptoms should prompt an immediate call or follow up with their primary physician or return to the emergency department. The importance of appropriate follow up was also discussed. I have reviewed the disposition diagnosis with the patient and or their family/guardian. I have answered their questions and given discharge instructions. They voiced understanding of these instructions and did not have any further questions or complaints. CONSULTS:    None    CRITICAL CARE:     None    PROCEDURES:    None    FINAL IMPRESSION      1.  Shortness of breath          DISPOSITION/PLAN   DISPOSITION Decision To Discharge 11/22/2022 01:39:36 AM      Condition on Disposition    Improved    PATIENT REFERRED TO:  Eddie Godoy MD  [address] [place] [address] 22. [phone]    Schedule an appointment as soon as possible for a visit in 2 days      DISCHARGE MEDICATIONS:  Current Discharge Medication List          (Please note that portions of this note were completed with a voice recognition program.  Efforts were made to edit the dictations but occasionally words are mis-transcribed.)    Anne Gonsalez DO, DO  Attending Emergency Physician       Anne Gonsalez DO  11/22/22 0140

## 2022-11-22 NOTE — DISCHARGE INSTRUCTIONS
PLEASE RETURN TO THE EMERGENCY DEPARTMENT IMMEDIATELY if your symptoms worsen in anyway or in 8-12 hours if not improved for re-evaluation. You should immediately return to the ER for symptoms such as increasing pain, shortness of breath, fever, a feeling of passing out, light headed, dizziness, abdominal pain, numbness or weakness to the arms or legs, coolness or color change of the arms or legs. Take your medication as indicated and prescribed. If you are given an antibiotic then, make sure you get the prescription filled and take the antibiotics until finished. Please understand that at this time there is no evidence for a more serious underlying process, but that early in the process of an illness or injury, an emergency department workup can be falsely reassuring. You should contact your family doctor within the next 24 hours for a follow up appointment    Tripp Cool!!!    From [facility] and [facility]    On behalf of the Emergency Department staff at [facility], I would like to thank you for giving us the opportunity to address your health care needs and concerns. We hope that during your visit, our service was delivered in a professional and caring manner. Please keep [facility] in mind as we walk with you down the path to your own personal wellness. Please expect an automated text message or email from us so we can ask a few questions about your health and progress. Based on your answers, a clinician may call you back to offer help and instructions. Please understand that early in the process of an illness or injury, an emergency department workup can be falsely reassuring. If you notice any worsening, changing or persistent symptoms please call your family doctor or return to the ER immediately. Tell us how we did during your visit at [email or web address]   and let us know about your experience

## 2022-11-22 NOTE — ED TRIAGE NOTES
Patient states she was getting ready for bed when suddenly she felt winded with increase heart rate to about 90. Patient states she laid down for a few minutes to see if it when improve. Patient state she has hx of palpitation and has followed up with PCP and was told if heart rate high for more than 30 minutes she should go to ED to be evaluated.

## 2022-11-29 ENCOUNTER — OFFICE VISIT (OUTPATIENT)
Dept: OBGYN CLINIC | Age: 52
End: 2022-11-29
Payer: COMMERCIAL

## 2022-11-29 VITALS
HEART RATE: 63 BPM | DIASTOLIC BLOOD PRESSURE: 81 MMHG | SYSTOLIC BLOOD PRESSURE: 128 MMHG | WEIGHT: 293 LBS | BODY MASS INDEX: 45.99 KG/M2 | HEIGHT: 67 IN

## 2022-11-29 DIAGNOSIS — N81.10 PROLAPSE OF ANTERIOR VAGINAL WALL: Primary | ICD-10-CM

## 2022-11-29 DIAGNOSIS — Z46.89 PESSARY MAINTENANCE: ICD-10-CM

## 2022-11-29 PROCEDURE — 3017F COLORECTAL CA SCREEN DOC REV: CPT | Performed by: OBSTETRICS & GYNECOLOGY

## 2022-11-29 PROCEDURE — G8482 FLU IMMUNIZE ORDER/ADMIN: HCPCS | Performed by: OBSTETRICS & GYNECOLOGY

## 2022-11-29 PROCEDURE — 99213 OFFICE O/P EST LOW 20 MIN: CPT | Performed by: OBSTETRICS & GYNECOLOGY

## 2022-11-29 PROCEDURE — G8417 CALC BMI ABV UP PARAM F/U: HCPCS | Performed by: OBSTETRICS & GYNECOLOGY

## 2022-11-29 PROCEDURE — 1036F TOBACCO NON-USER: CPT | Performed by: OBSTETRICS & GYNECOLOGY

## 2022-11-29 PROCEDURE — G8427 DOCREV CUR MEDS BY ELIG CLIN: HCPCS | Performed by: OBSTETRICS & GYNECOLOGY

## 2022-11-29 ASSESSMENT — ENCOUNTER SYMPTOMS
BACK PAIN: 0
SHORTNESS OF BREATH: 0
ABDOMINAL PAIN: 0
COUGH: 0

## 2022-11-29 NOTE — PROGRESS NOTES
[address] [facility] - [address]  [address]  Dept: [phone]    Chief Complaint   Patient presents with    Follow-up       Dionna Calvillo is a 46 yo female who presents for pessary maintenance. She was last here 3 months ago. She says the pessary fell out within a week of her fitting. She did like the pessary while it was in place and would like to try a larger size. Review of Systems   Constitutional:  Negative for chills and fever. HENT:  Negative for congestion. Respiratory:  Negative for cough and shortness of breath. Cardiovascular:  Negative for chest pain and palpitations. Gastrointestinal:  Negative for abdominal pain. Genitourinary:  Negative for dyspareunia, pelvic pain and vaginal discharge. Musculoskeletal:  Negative for back pain. Neurological:  Negative for dizziness and light-headedness. Psychiatric/Behavioral:  The patient is not nervous/anxious. O:Blood pressure 128/81, pulse 63, height 5' 6.5\" (1.689 m), weight (!) 309 lb (140.2 kg). Gen: alert, no apparent distress  Pelvic:  There is not any signs of infection; The vaginal vault is without any signs of erythema or erosion. There is no vaginal discharge or odor appreciated. Pessary size #5 with support was placed without difficulty. T.O.S. Ointment was placed with the pessary to decreasedischarge/odor. A/P:   Diagnosis Orders   1. Prolapse of anterior vaginal wall        2.  Pessary maintenance            Pt to follow up 4-6 wks for pessary check  Sandra Hollingsworth MD  [address]

## 2023-01-10 ENCOUNTER — OFFICE VISIT (OUTPATIENT)
Dept: OBGYN CLINIC | Age: 53
End: 2023-01-10
Payer: COMMERCIAL

## 2023-01-10 VITALS
BODY MASS INDEX: 45.99 KG/M2 | HEART RATE: 79 BPM | SYSTOLIC BLOOD PRESSURE: 143 MMHG | HEIGHT: 67 IN | DIASTOLIC BLOOD PRESSURE: 83 MMHG | WEIGHT: 293 LBS

## 2023-01-10 DIAGNOSIS — N81.10 PROLAPSE OF ANTERIOR VAGINAL WALL: Primary | ICD-10-CM

## 2023-01-10 DIAGNOSIS — Z46.89 PESSARY MAINTENANCE: ICD-10-CM

## 2023-01-10 PROCEDURE — G8427 DOCREV CUR MEDS BY ELIG CLIN: HCPCS | Performed by: OBSTETRICS & GYNECOLOGY

## 2023-01-10 PROCEDURE — 3017F COLORECTAL CA SCREEN DOC REV: CPT | Performed by: OBSTETRICS & GYNECOLOGY

## 2023-01-10 PROCEDURE — 1036F TOBACCO NON-USER: CPT | Performed by: OBSTETRICS & GYNECOLOGY

## 2023-01-10 PROCEDURE — 99213 OFFICE O/P EST LOW 20 MIN: CPT | Performed by: OBSTETRICS & GYNECOLOGY

## 2023-01-10 PROCEDURE — G8417 CALC BMI ABV UP PARAM F/U: HCPCS | Performed by: OBSTETRICS & GYNECOLOGY

## 2023-01-10 PROCEDURE — G8482 FLU IMMUNIZE ORDER/ADMIN: HCPCS | Performed by: OBSTETRICS & GYNECOLOGY

## 2023-01-10 ASSESSMENT — ENCOUNTER SYMPTOMS
BACK PAIN: 0
ABDOMINAL PAIN: 0
SHORTNESS OF BREATH: 0
COUGH: 0

## 2023-01-10 NOTE — PROGRESS NOTES
[address] [facility] - [address] [address] [facility] [address]  Dept: [phone]    Chief Complaint   Patient presents with    Follow-up     Pessary        Virgil De La Cruz is a 48 yo female who presents for pessary maintenance. She was last here 3 months ago. She says she is doing well and not having any problems. She denies any vaginal bleeding or abnormal vaginal discharge. She is still happy with her pessary. Review of Systems   Constitutional:  Negative for chills and fever. HENT:  Negative for congestion. Respiratory:  Negative for cough and shortness of breath. Cardiovascular:  Negative for chest pain and palpitations. Gastrointestinal:  Negative for abdominal pain. Genitourinary:  Negative for pelvic pain and vaginal discharge. Musculoskeletal:  Positive for arthralgias. Negative for back pain. Neurological:  Negative for dizziness and light-headedness. Psychiatric/Behavioral:  The patient is not nervous/anxious. Past Medical History:   Diagnosis Date    Bipolar 1 disorder ([facility]     Depressed     H/O: hysterectomy 09/22/2020    prolapsed uterus    Obesity        Current Outpatient Medications on File Prior to Visit   Medication Sig Dispense Refill    clonazePAM (KLONOPIN) 0.5 MG tablet Take 1 tablet by mouth daily as needed for Anxiety for up to 30 days. 30 tablet 0    halobetasol (ULTRAVATE) 0.05 % cream Apply topically 2 times daily 50 g 1    OLANZapine (ZYPREXA) 15 MG tablet Take 1 tablet by mouth daily      vitamin D (ERGOCALCIFEROL) 1.25 MG (51456 UT) CAPS capsule Take 1 capsule by mouth Twice a week      escitalopram (LEXAPRO) 10 MG tablet Take 10 mg by mouth daily      traZODone (DESYREL) 50 MG tablet Take 1 tablet by mouth nightly as needed for Sleep 14 tablet 0     No current facility-administered medications on file prior to visit.        Past Surgical History:   Procedure Laterality Date APPENDECTOMY      CHOLECYSTECTOMY      HERNIA REPAIR      HYSTERECTOMY (CERVIX STATUS UNKNOWN)      for bleeding    LAPAROSCOPY         Social History     Socioeconomic History    Marital status:      Spouse name: Dr. Donny Anderson    Number of children: 2    Years of education: Not on file    Highest education level: Not on file   Occupational History    Not on file   Tobacco Use    Smoking status: Never    Smokeless tobacco: Never   Vaping Use    Vaping Use: Never used   Substance and Sexual Activity    Alcohol use: Yes     Comment: social    Drug use: Not Currently    Sexual activity: Yes   Other Topics Concern    Not on file   Social History Narrative    Not on file     Social Determinants of Health     Financial Resource Strain: Low Risk     Difficulty of Paying Living Expenses: Not hard at all   Food Insecurity: No Food Insecurity    Worried About Running Out of Food in the Last Year: Never true    [address] in the Last Year: Never true   Transportation Needs: No Transportation Needs    Lack of Transportation (Medical): No    Lack of Transportation (Non-Medical): No   Physical Activity: Not on file   Stress: Not on file   Social Connections: Not on file   Intimate Partner Violence: Not on file   Housing Stability: Not on file       O:Blood pressure (!) 143/83, pulse 79, height 5' 6.5\" (1.689 m), weight (!) 303 lb (137.4 kg). Gen: alert, no apparent distress  Pelvic:  There is not any signs of infection; The vaginal vault is without any signs of erythema or erosion. There is no vaginal discharge or odor appreciated. Pessary removed easily and cleansed with water and soap. Pessary replaced without difficulty. T.O.S. Ointment was placed with the pessary to decreasedischarge/odor. A/P:   Diagnosis Orders   1. Prolapse of anterior vaginal wall        2.  Pessary maintenance            Pt to follow up in 3 months  Tushar Whitten MD  [address]

## 2023-04-20 ENCOUNTER — OFFICE VISIT (OUTPATIENT)
Dept: FAMILY MEDICINE CLINIC | Age: 53
End: 2023-04-20
Payer: COMMERCIAL

## 2023-04-20 VITALS
DIASTOLIC BLOOD PRESSURE: 80 MMHG | WEIGHT: 293 LBS | HEART RATE: 74 BPM | SYSTOLIC BLOOD PRESSURE: 118 MMHG | BODY MASS INDEX: 48.24 KG/M2

## 2023-04-20 DIAGNOSIS — R53.82 CHRONIC FATIGUE: Primary | ICD-10-CM

## 2023-04-20 DIAGNOSIS — N76.0 ACUTE VAGINITIS: ICD-10-CM

## 2023-04-20 DIAGNOSIS — R40.0 DAYTIME SLEEPINESS: ICD-10-CM

## 2023-04-20 DIAGNOSIS — L30.1 DYSHIDROTIC ECZEMA: ICD-10-CM

## 2023-04-20 DIAGNOSIS — R06.83 SNORING: ICD-10-CM

## 2023-04-20 DIAGNOSIS — F20.9 SCHIZOPHRENIA, UNSPECIFIED TYPE (HCC): ICD-10-CM

## 2023-04-20 DIAGNOSIS — F31.9 BIPOLAR 1 DISORDER (HCC): ICD-10-CM

## 2023-04-20 PROCEDURE — 3017F COLORECTAL CA SCREEN DOC REV: CPT | Performed by: FAMILY MEDICINE

## 2023-04-20 PROCEDURE — 99214 OFFICE O/P EST MOD 30 MIN: CPT | Performed by: FAMILY MEDICINE

## 2023-04-20 PROCEDURE — G8417 CALC BMI ABV UP PARAM F/U: HCPCS | Performed by: FAMILY MEDICINE

## 2023-04-20 PROCEDURE — G8427 DOCREV CUR MEDS BY ELIG CLIN: HCPCS | Performed by: FAMILY MEDICINE

## 2023-04-20 PROCEDURE — 1036F TOBACCO NON-USER: CPT | Performed by: FAMILY MEDICINE

## 2023-04-20 SDOH — ECONOMIC STABILITY: FOOD INSECURITY: WITHIN THE PAST 12 MONTHS, THE FOOD YOU BOUGHT JUST DIDN'T LAST AND YOU DIDN'T HAVE MONEY TO GET MORE.: NEVER TRUE

## 2023-04-20 SDOH — ECONOMIC STABILITY: HOUSING INSECURITY
IN THE LAST 12 MONTHS, WAS THERE A TIME WHEN YOU DID NOT HAVE A STEADY PLACE TO SLEEP OR SLEPT IN A SHELTER (INCLUDING NOW)?: NO

## 2023-04-20 SDOH — ECONOMIC STABILITY: FOOD INSECURITY: WITHIN THE PAST 12 MONTHS, YOU WORRIED THAT YOUR FOOD WOULD RUN OUT BEFORE YOU GOT MONEY TO BUY MORE.: NEVER TRUE

## 2023-04-20 SDOH — ECONOMIC STABILITY: INCOME INSECURITY: HOW HARD IS IT FOR YOU TO PAY FOR THE VERY BASICS LIKE FOOD, HOUSING, MEDICAL CARE, AND HEATING?: NOT HARD AT ALL

## 2023-04-20 ASSESSMENT — ENCOUNTER SYMPTOMS
EYES NEGATIVE: 1
ABDOMINAL PAIN: 0
BLOOD IN STOOL: 0
COUGH: 0
DIARRHEA: 0
ALLERGIC/IMMUNOLOGIC NEGATIVE: 1
SHORTNESS OF BREATH: 0
CONSTIPATION: 0

## 2023-04-20 ASSESSMENT — PATIENT HEALTH QUESTIONNAIRE - PHQ9
SUM OF ALL RESPONSES TO PHQ QUESTIONS 1-9: 4
6. FEELING BAD ABOUT YOURSELF - OR THAT YOU ARE A FAILURE OR HAVE LET YOURSELF OR YOUR FAMILY DOWN: 0
10. IF YOU CHECKED OFF ANY PROBLEMS, HOW DIFFICULT HAVE THESE PROBLEMS MADE IT FOR YOU TO DO YOUR WORK, TAKE CARE OF THINGS AT HOME, OR GET ALONG WITH OTHER PEOPLE: 0
SUM OF ALL RESPONSES TO PHQ QUESTIONS 1-9: 4
2. FEELING DOWN, DEPRESSED OR HOPELESS: 0
1. LITTLE INTEREST OR PLEASURE IN DOING THINGS: 0
5. POOR APPETITE OR OVEREATING: 0
4. FEELING TIRED OR HAVING LITTLE ENERGY: 1
SUM OF ALL RESPONSES TO PHQ QUESTIONS 1-9: 4
SUM OF ALL RESPONSES TO PHQ9 QUESTIONS 1 & 2: 0
3. TROUBLE FALLING OR STAYING ASLEEP: 3
7. TROUBLE CONCENTRATING ON THINGS, SUCH AS READING THE NEWSPAPER OR WATCHING TELEVISION: 0
8. MOVING OR SPEAKING SO SLOWLY THAT OTHER PEOPLE COULD HAVE NOTICED. OR THE OPPOSITE, BEING SO FIGETY OR RESTLESS THAT YOU HAVE BEEN MOVING AROUND A LOT MORE THAN USUAL: 0
9. THOUGHTS THAT YOU WOULD BE BETTER OFF DEAD, OR OF HURTING YOURSELF: 0
SUM OF ALL RESPONSES TO PHQ QUESTIONS 1-9: 4

## 2023-04-20 NOTE — PROGRESS NOTES
status:      Spouse name: Dr. Kulwant Cope    Number of children: 2    Years of education: None    Highest education level: None   Tobacco Use    Smoking status: Never    Smokeless tobacco: Never   Vaping Use    Vaping Use: Never used   Substance and Sexual Activity    Alcohol use: Yes     Comment: social    Drug use: Not Currently    Sexual activity: Yes     Social Determinants of Health     Financial Resource Strain: Low Risk     Difficulty of Paying Living Expenses: Not hard at all   Food Insecurity: No Food Insecurity    Worried About [address] in the Last Year: Never true    [address] in the Last Year: Never true   Transportation Needs: No Transportation Needs    Lack of Transportation (Medical): No    Lack of Transportation (Non-Medical): No   Housing Stability: Unknown    Unstable Housing in the Last Year: No       SURGICAL HISTORY    Past Surgical History:   Procedure Laterality Date    APPENDECTOMY      CHOLECYSTECTOMY      HERNIA REPAIR      HYSTERECTOMY (CERVIX STATUS UNKNOWN)      for bleeding    LAPAROSCOPY         CURRENT MEDICATIONS    Current Outpatient Medications   Medication Sig Dispense Refill    nystatin-triamcinolone (MYCOLOG II) 872046-4.1 UNIT/GM-% cream Apply topically 2 times daily. 60 g 1    halobetasol (ULTRAVATE) 0.05 % cream Apply topically 2 times daily 50 g 1    OLANZapine (ZYPREXA) 15 MG tablet Take 1 tablet by mouth daily      vitamin D (ERGOCALCIFEROL) 1.25 MG (33547 UT) CAPS capsule Take 1 capsule by mouth Twice a week      escitalopram (LEXAPRO) 10 MG tablet Take 1 tablet by mouth daily      traZODone (DESYREL) 50 MG tablet Take 1 tablet by mouth nightly as needed for Sleep 14 tablet 0     No current facility-administered medications for this visit. ALLERGIES    Allergies   Allergen Reactions    Strawberry Extract        PHYSICAL EXAM   Physical Exam  Vitals reviewed. Constitutional:       Appearance: She is well-developed. She is obese.    HENT:

## 2023-07-11 ENCOUNTER — OFFICE VISIT (OUTPATIENT)
Dept: OBGYN CLINIC | Age: 53
End: 2023-07-11

## 2023-07-11 VITALS
HEIGHT: 67 IN | WEIGHT: 293 LBS | BODY MASS INDEX: 45.99 KG/M2 | DIASTOLIC BLOOD PRESSURE: 74 MMHG | SYSTOLIC BLOOD PRESSURE: 137 MMHG | HEART RATE: 80 BPM

## 2023-07-11 DIAGNOSIS — Z12.31 ENCOUNTER FOR SCREENING MAMMOGRAM FOR MALIGNANT NEOPLASM OF BREAST: ICD-10-CM

## 2023-07-11 DIAGNOSIS — N81.10 PROLAPSE OF ANTERIOR VAGINAL WALL: ICD-10-CM

## 2023-07-11 DIAGNOSIS — Z46.89 ENCOUNTER FOR PESSARY MAINTENANCE: Primary | ICD-10-CM

## 2023-07-11 ASSESSMENT — ENCOUNTER SYMPTOMS
COUGH: 0
SHORTNESS OF BREATH: 0
ABDOMINAL PAIN: 0
BACK PAIN: 0

## 2023-07-11 NOTE — PROGRESS NOTES
[address] [facility]  [facility] - [address] [place] [address]  Dept: [phone]    Chief Complaint   Patient presents with    Follow-up       Tayo Garcia is a 46 yo female who presents for pessary maintenance. She was last here 3 months ago. She says she is doing well and not having any problems. She denies any vaginal bleeding or abnormal vaginal discharge. She is still happy with her pessary. She succeeded at taking the pessary out and putting it back in on her own last week. Review of Systems   Constitutional:  Negative for chills and fever. HENT:  Negative for congestion and hearing loss. Respiratory:  Negative for cough and shortness of breath. Cardiovascular:  Negative for chest pain and palpitations. Gastrointestinal:  Negative for abdominal pain. Genitourinary:  Negative for pelvic pain and vaginal discharge. Musculoskeletal:  Negative for back pain. Neurological:  Negative for dizziness and light-headedness. Psychiatric/Behavioral:  The patient is not nervous/anxious. Past Medical History:   Diagnosis Date    Bipolar 1 disorder ([address] [facility])     Depressed     H/O: hysterectomy 09/22/2020    prolapsed uterus    Obesity        Current Outpatient Medications on File Prior to Visit   Medication Sig Dispense Refill    nystatin-triamcinolone (MYCOLOG II) 151601-2.1 UNIT/GM-% cream Apply topically 2 times daily.  60 g 1    OLANZapine (ZYPREXA) 15 MG tablet Take 1 tablet by mouth daily      vitamin D (ERGOCALCIFEROL) 1.25 MG (99945 UT) CAPS capsule Take 1 capsule by mouth Twice a week      escitalopram (LEXAPRO) 10 MG tablet Take 1 tablet by mouth daily      halobetasol (ULTRAVATE) 0.05 % cream Apply topically 2 times daily (Patient not taking: Reported on 7/11/2023) 50 g 1    traZODone (DESYREL) 50 MG tablet Take 1 tablet by mouth nightly as needed for Sleep (Patient not taking: Reported on 7/11/2023) 14

## 2023-07-13 ENCOUNTER — TELEPHONE (OUTPATIENT)
Dept: FAMILY MEDICINE CLINIC | Age: 53
End: 2023-07-13

## 2023-07-13 NOTE — TELEPHONE ENCOUNTER
Patient called in stating she has been having pain in her left calf for the last two night    She is requesting to be seen in office       Duration 2 days     Sx pain in calf, no swelling or redness    Please advise

## 2023-07-14 ENCOUNTER — OFFICE VISIT (OUTPATIENT)
Dept: FAMILY MEDICINE CLINIC | Age: 53
End: 2023-07-14
Payer: COMMERCIAL

## 2023-07-14 VITALS
HEART RATE: 82 BPM | SYSTOLIC BLOOD PRESSURE: 132 MMHG | BODY MASS INDEX: 50.53 KG/M2 | DIASTOLIC BLOOD PRESSURE: 70 MMHG | WEIGHT: 293 LBS

## 2023-07-14 DIAGNOSIS — M79.662 PAIN OF LEFT CALF: Primary | ICD-10-CM

## 2023-07-14 PROCEDURE — 3017F COLORECTAL CA SCREEN DOC REV: CPT | Performed by: FAMILY MEDICINE

## 2023-07-14 PROCEDURE — 99212 OFFICE O/P EST SF 10 MIN: CPT | Performed by: FAMILY MEDICINE

## 2023-07-14 PROCEDURE — G8427 DOCREV CUR MEDS BY ELIG CLIN: HCPCS | Performed by: FAMILY MEDICINE

## 2023-07-14 PROCEDURE — 1036F TOBACCO NON-USER: CPT | Performed by: FAMILY MEDICINE

## 2023-07-14 PROCEDURE — G8417 CALC BMI ABV UP PARAM F/U: HCPCS | Performed by: FAMILY MEDICINE

## 2023-07-14 ASSESSMENT — ENCOUNTER SYMPTOMS
CHEST TIGHTNESS: 0
SHORTNESS OF BREATH: 0

## 2023-07-14 NOTE — PROGRESS NOTES
[address] [address]  Dept: [phone]    7/14/2023    CHIEF COMPLAINT    Chief Complaint   Patient presents with    Leg Pain       SERGIO Almaraz is a 47 y.o. female who presents   Chief Complaint   Patient presents with    Leg Pain   . Patient concerned with pain in her left calf which started 2 days ago and has since resolved. It was a sharp stabbing pain. There was no redness, warmth or swelling. She does have a history of varicose veins and wears compression socks daily. No history of DVT. No injury to the calf. Vitals:    07/14/23 0914   BP: 132/70   Pulse: 82   Weight: (!) 317 lb 12.8 oz (144.2 kg)       REVIEW OF SYSTEMS    Review of Systems   Respiratory:  Negative for chest tightness and shortness of breath. Cardiovascular:  Negative for chest pain and leg swelling.      PAST MEDICAL HISTORY    Past Medical History:   Diagnosis Date    Bipolar 1 disorder ([address])     Depressed     H/O: hysterectomy 09/22/2020    prolapsed uterus    Obesity        FAMILY HISTORY    Family History   Problem Relation Age of Onset    Arthritis Mother     Hypertension Mother     Coronary Art Dis Father     Cancer Sister         rectal       SOCIAL HISTORY    Social History     Socioeconomic History    Marital status:      Spouse name: Dr. Polo Hutchison    Number of children: 2    Years of education: None    Highest education level: None   Tobacco Use    Smoking status: Never    Smokeless tobacco: Never   Vaping Use    Vaping Use: Never used   Substance and Sexual Activity    Alcohol use: Yes     Comment: social    Drug use: Not Currently    Sexual activity: Yes     Social Determinants of Health     Financial Resource Strain: Low Risk     Difficulty of Paying Living Expenses: Not hard at all   Food Insecurity: No Food Insecurity    Worried About [place] in the Last Year: Never true    Ran Out of

## 2023-10-02 ENCOUNTER — HOSPITAL ENCOUNTER (OUTPATIENT)
Dept: MAMMOGRAPHY | Age: 53
Discharge: HOME | End: 2023-10-04
Attending: OBSTETRICS & GYNECOLOGY
Payer: COMMERCIAL

## 2023-10-02 VITALS — WEIGHT: 293 LBS | BODY MASS INDEX: 47.09 KG/M2 | HEIGHT: 66 IN

## 2023-10-02 DIAGNOSIS — Z12.31 ENCOUNTER FOR SCREENING MAMMOGRAM FOR MALIGNANT NEOPLASM OF BREAST: ICD-10-CM

## 2023-10-02 PROCEDURE — 77063 BREAST TOMOSYNTHESIS BI: CPT

## 2024-01-18 ENCOUNTER — OFFICE VISIT (OUTPATIENT)
Dept: FAMILY MEDICINE CLINIC | Age: 54
End: 2024-01-18
Payer: COMMERCIAL

## 2024-01-18 VITALS
WEIGHT: 293 LBS | HEART RATE: 82 BPM | BODY MASS INDEX: 47.45 KG/M2 | SYSTOLIC BLOOD PRESSURE: 110 MMHG | DIASTOLIC BLOOD PRESSURE: 72 MMHG

## 2024-01-18 DIAGNOSIS — R53.82 CHRONIC FATIGUE: ICD-10-CM

## 2024-01-18 DIAGNOSIS — G47.33 OSA (OBSTRUCTIVE SLEEP APNEA): ICD-10-CM

## 2024-01-18 DIAGNOSIS — L30.1 DYSHIDROTIC ECZEMA: Primary | ICD-10-CM

## 2024-01-18 DIAGNOSIS — F25.1 SCHIZOAFFECTIVE DISORDER, DEPRESSIVE TYPE (HCC): ICD-10-CM

## 2024-01-18 DIAGNOSIS — E66.01 CLASS 3 SEVERE OBESITY DUE TO EXCESS CALORIES WITHOUT SERIOUS COMORBIDITY WITH BODY MASS INDEX (BMI) OF 45.0 TO 49.9 IN ADULT (HCC): ICD-10-CM

## 2024-01-18 DIAGNOSIS — R63.5 WEIGHT GAIN, ABNORMAL: ICD-10-CM

## 2024-01-18 PROCEDURE — 1036F TOBACCO NON-USER: CPT | Performed by: FAMILY MEDICINE

## 2024-01-18 PROCEDURE — G8484 FLU IMMUNIZE NO ADMIN: HCPCS | Performed by: FAMILY MEDICINE

## 2024-01-18 PROCEDURE — 99214 OFFICE O/P EST MOD 30 MIN: CPT | Performed by: FAMILY MEDICINE

## 2024-01-18 PROCEDURE — G8427 DOCREV CUR MEDS BY ELIG CLIN: HCPCS | Performed by: FAMILY MEDICINE

## 2024-01-18 PROCEDURE — 3017F COLORECTAL CA SCREEN DOC REV: CPT | Performed by: FAMILY MEDICINE

## 2024-01-18 PROCEDURE — G8417 CALC BMI ABV UP PARAM F/U: HCPCS | Performed by: FAMILY MEDICINE

## 2024-01-18 ASSESSMENT — PATIENT HEALTH QUESTIONNAIRE - PHQ9
5. POOR APPETITE OR OVEREATING: 1
1. LITTLE INTEREST OR PLEASURE IN DOING THINGS: 0
7. TROUBLE CONCENTRATING ON THINGS, SUCH AS READING THE NEWSPAPER OR WATCHING TELEVISION: 0
6. FEELING BAD ABOUT YOURSELF - OR THAT YOU ARE A FAILURE OR HAVE LET YOURSELF OR YOUR FAMILY DOWN: 0
4. FEELING TIRED OR HAVING LITTLE ENERGY: 1
8. MOVING OR SPEAKING SO SLOWLY THAT OTHER PEOPLE COULD HAVE NOTICED. OR THE OPPOSITE, BEING SO FIGETY OR RESTLESS THAT YOU HAVE BEEN MOVING AROUND A LOT MORE THAN USUAL: 0
9. THOUGHTS THAT YOU WOULD BE BETTER OFF DEAD, OR OF HURTING YOURSELF: 0
3. TROUBLE FALLING OR STAYING ASLEEP: 0
SUM OF ALL RESPONSES TO PHQ QUESTIONS 1-9: 2
SUM OF ALL RESPONSES TO PHQ QUESTIONS 1-9: 2
10. IF YOU CHECKED OFF ANY PROBLEMS, HOW DIFFICULT HAVE THESE PROBLEMS MADE IT FOR YOU TO DO YOUR WORK, TAKE CARE OF THINGS AT HOME, OR GET ALONG WITH OTHER PEOPLE: 0
SUM OF ALL RESPONSES TO PHQ QUESTIONS 1-9: 2
SUM OF ALL RESPONSES TO PHQ QUESTIONS 1-9: 2
2. FEELING DOWN, DEPRESSED OR HOPELESS: 0
SUM OF ALL RESPONSES TO PHQ9 QUESTIONS 1 & 2: 0

## 2024-01-18 ASSESSMENT — ENCOUNTER SYMPTOMS
CONSTIPATION: 0
BLOOD IN STOOL: 0
ALLERGIC/IMMUNOLOGIC NEGATIVE: 1
COUGH: 0
EYES NEGATIVE: 1
SHORTNESS OF BREATH: 1
ABDOMINAL PAIN: 0
DIARRHEA: 0

## 2024-01-18 NOTE — PROGRESS NOTES
sleep apnea)  Pt did not want to get testing done at this time.        Vy received counseling on the following healthy behaviors: nutrition, exercise, and medication adherence  Reviewed prior labs and health maintenance.  Continue current medications, diet and exercise.  Discussed use, benefit, and side effects of prescribed medications. Barriers to medication compliance addressed.   Patient given educational materials - see patient instructions.    All patient questions answered.  Patient voiced understanding.      Return in about 6 months (around 7/18/2024), or if symptoms worsen or fail to improve, for fatigue, weight check.        Electronically signed by Marianela Bates MD on 1/18/24 at 9:18 AM EST

## 2024-05-29 ENCOUNTER — TELEPHONE (OUTPATIENT)
Dept: FAMILY MEDICINE CLINIC | Age: 54
End: 2024-05-29

## 2024-05-29 NOTE — TELEPHONE ENCOUNTER
Patient called in wanting to know if she can use the halobetasol cream on her butt crack due to her rash is spreading     Duration couple weeks       Please advise

## 2024-06-17 ENCOUNTER — HOSPITAL ENCOUNTER (OUTPATIENT)
Age: 54
Setting detail: SPECIMEN
Discharge: HOME | End: 2024-06-17

## 2024-06-17 ENCOUNTER — OFFICE VISIT (OUTPATIENT)
Dept: FAMILY MEDICINE CLINIC | Age: 54
End: 2024-06-17
Payer: COMMERCIAL

## 2024-06-17 VITALS
DIASTOLIC BLOOD PRESSURE: 70 MMHG | SYSTOLIC BLOOD PRESSURE: 110 MMHG | HEART RATE: 90 BPM | WEIGHT: 293 LBS | BODY MASS INDEX: 47.84 KG/M2

## 2024-06-17 DIAGNOSIS — R11.0 NAUSEA: ICD-10-CM

## 2024-06-17 DIAGNOSIS — E86.0 DEHYDRATION: ICD-10-CM

## 2024-06-17 DIAGNOSIS — R53.83 FATIGUE, UNSPECIFIED TYPE: ICD-10-CM

## 2024-06-17 DIAGNOSIS — G44.51 HEMICRANIA CONTINUA: ICD-10-CM

## 2024-06-17 DIAGNOSIS — G44.51 HEMICRANIA CONTINUA: Primary | ICD-10-CM

## 2024-06-17 LAB
ALBUMIN SERPL-MCNC: 4.9 G/DL (ref 3.5–5.2)
ALBUMIN/GLOB SERPL: 2 {RATIO} (ref 1–2.5)
ALP SERPL-CCNC: 82 U/L (ref 35–104)
ALT SERPL-CCNC: 27 U/L (ref 10–35)
ANION GAP SERPL CALCULATED.3IONS-SCNC: 14 MMOL/L (ref 9–16)
AST SERPL-CCNC: 20 U/L (ref 10–35)
BASOPHILS # BLD: 0.07 K/UL (ref 0–0.2)
BASOPHILS NFR BLD: 1 % (ref 0–2)
BILIRUB SERPL-MCNC: 0.9 MG/DL (ref 0–1.2)
BUN SERPL-MCNC: 14 MG/DL (ref 6–20)
CALCIUM SERPL-MCNC: 9.6 MG/DL (ref 8.6–10.4)
CHLORIDE SERPL-SCNC: 105 MMOL/L (ref 98–107)
CO2 SERPL-SCNC: 23 MMOL/L (ref 20–31)
CREAT SERPL-MCNC: 0.9 MG/DL (ref 0.5–0.9)
EOSINOPHIL # BLD: 0.03 K/UL (ref 0–0.44)
EOSINOPHILS RELATIVE PERCENT: 0 % (ref 1–4)
ERYTHROCYTE [SEDIMENTATION RATE] IN BLOOD BY PHOTOMETRIC METHOD: 1 MM/HR (ref 0–30)
GFR, ESTIMATED: 81 ML/MIN/1.73M2
GLUCOSE SERPL-MCNC: 105 MG/DL (ref 74–99)
HCT VFR BLD AUTO: 46.9 % (ref 36.3–47.1)
HGB BLD-MCNC: 15.8 G/DL (ref 11.9–15.1)
IMM GRANULOCYTES # BLD AUTO: 0.07 K/UL (ref 0–0.3)
IMM GRANULOCYTES NFR BLD: 1 %
LYMPHOCYTES NFR BLD: 1.98 K/UL (ref 1.1–3.7)
LYMPHOCYTES RELATIVE PERCENT: 17 % (ref 24–43)
MAGNESIUM SERPL-MCNC: 2.3 MG/DL (ref 1.6–2.6)
MCH RBC QN AUTO: 30.1 PG (ref 25.2–33.5)
MCHC RBC AUTO-ENTMCNC: 33.7 G/DL (ref 28.4–34.8)
MCV RBC AUTO: 89.3 FL (ref 82.6–102.9)
MONOCYTES NFR BLD: 0.8 K/UL (ref 0.1–1.2)
MONOCYTES NFR BLD: 7 % (ref 3–12)
NEUTROPHILS NFR BLD: 74 % (ref 36–65)
NEUTS SEG NFR BLD: 8.42 K/UL (ref 1.5–8.1)
NRBC BLD-RTO: 0 PER 100 WBC
PLATELET # BLD AUTO: 288 K/UL (ref 138–453)
PMV BLD AUTO: 10 FL (ref 8.1–13.5)
POTASSIUM SERPL-SCNC: 4.2 MMOL/L (ref 3.7–5.3)
PROT SERPL-MCNC: 7.2 G/DL (ref 6.6–8.7)
RBC # BLD AUTO: 5.25 M/UL (ref 3.95–5.11)
SODIUM SERPL-SCNC: 142 MMOL/L (ref 136–145)
WBC OTHER # BLD: 11.4 K/UL (ref 3.5–11.3)

## 2024-06-17 PROCEDURE — 99214 OFFICE O/P EST MOD 30 MIN: CPT | Performed by: FAMILY MEDICINE

## 2024-06-17 PROCEDURE — G8427 DOCREV CUR MEDS BY ELIG CLIN: HCPCS | Performed by: FAMILY MEDICINE

## 2024-06-17 PROCEDURE — G8417 CALC BMI ABV UP PARAM F/U: HCPCS | Performed by: FAMILY MEDICINE

## 2024-06-17 PROCEDURE — 96372 THER/PROPH/DIAG INJ SC/IM: CPT | Performed by: FAMILY MEDICINE

## 2024-06-17 PROCEDURE — 1036F TOBACCO NON-USER: CPT | Performed by: FAMILY MEDICINE

## 2024-06-17 PROCEDURE — 3017F COLORECTAL CA SCREEN DOC REV: CPT | Performed by: FAMILY MEDICINE

## 2024-06-17 RX ORDER — KETOROLAC TROMETHAMINE 30 MG/ML
60 INJECTION, SOLUTION INTRAMUSCULAR; INTRAVENOUS ONCE
Status: COMPLETED | OUTPATIENT
Start: 2024-06-17 | End: 2024-06-17

## 2024-06-17 RX ORDER — IBUPROFEN 200 MG
200 TABLET ORAL EVERY 6 HOURS PRN
COMMUNITY

## 2024-06-17 RX ORDER — ONDANSETRON 4 MG/1
4 TABLET, FILM COATED ORAL 3 TIMES DAILY PRN
Qty: 15 TABLET | Refills: 1 | Status: SHIPPED | OUTPATIENT
Start: 2024-06-17

## 2024-06-17 RX ORDER — ACETAMINOPHEN 325 MG/1
650 TABLET ORAL EVERY 6 HOURS PRN
COMMUNITY

## 2024-06-17 RX ADMIN — KETOROLAC TROMETHAMINE 60 MG: 30 INJECTION, SOLUTION INTRAMUSCULAR; INTRAVENOUS at 10:32

## 2024-06-17 SDOH — ECONOMIC STABILITY: FOOD INSECURITY: WITHIN THE PAST 12 MONTHS, THE FOOD YOU BOUGHT JUST DIDN'T LAST AND YOU DIDN'T HAVE MONEY TO GET MORE.: NEVER TRUE

## 2024-06-17 SDOH — ECONOMIC STABILITY: FOOD INSECURITY: WITHIN THE PAST 12 MONTHS, YOU WORRIED THAT YOUR FOOD WOULD RUN OUT BEFORE YOU GOT MONEY TO BUY MORE.: NEVER TRUE

## 2024-06-17 SDOH — ECONOMIC STABILITY: INCOME INSECURITY: HOW HARD IS IT FOR YOU TO PAY FOR THE VERY BASICS LIKE FOOD, HOUSING, MEDICAL CARE, AND HEATING?: NOT HARD AT ALL

## 2024-06-17 ASSESSMENT — ENCOUNTER SYMPTOMS
CONSTIPATION: 0
NAUSEA: 1
EYES NEGATIVE: 1
DIARRHEA: 0
ALLERGIC/IMMUNOLOGIC NEGATIVE: 1
COUGH: 0
ABDOMINAL PAIN: 0
SHORTNESS OF BREATH: 0

## 2024-06-17 NOTE — PROGRESS NOTES
[facility]  [facility]  [address]  [address]  [facility] [address]  Dept: [phone]    6/17/2024    CHIEF COMPLAINT    Chief Complaint   Patient presents with    Headache       HPI    Vy Hawthorne is a 53 y.o. female who presents   Chief Complaint   Patient presents with    Headache   .  Has had a headache for 3 days on the top of her head. No aura or visual disturbance. Did not eat yesterday, only had a glass of water. Hx of migraine as a teen, not since then. Has taken ibuprofen and tylenol ES. Has had some nausea, no vomiting.  Admits to some increase in stress recently.  Seeing her psychiatrist for history of bipolar disorder.  No change in medications recently.        Vitals:    06/17/24 1002   BP: 110/70   Pulse: 90   Weight: 134.4 kg (296 lb 6.4 oz)       REVIEW OF SYSTEMS    Review of Systems   Constitutional:  Positive for fatigue. Negative for chills, diaphoresis, fever and unexpected weight change.   HENT: Negative.     Eyes: Negative.    Respiratory:  Negative for cough and shortness of breath.    Cardiovascular:  Negative for chest pain and leg swelling.   Gastrointestinal:  Positive for nausea. Negative for abdominal pain, constipation and diarrhea.   Endocrine: Negative.    Genitourinary:  Negative for frequency and urgency.   Musculoskeletal: Negative.    Skin: Negative.    Allergic/Immunologic: Negative.    Neurological:  Positive for headaches. Negative for dizziness.   Hematological: Negative.    Psychiatric/Behavioral:  Positive for dysphoric mood. Negative for sleep disturbance. The patient is nervous/anxious.         Seeing psychiatrist for bipolar.       PAST MEDICAL HISTORY    Past Medical History:   Diagnosis Date    Bipolar 1 disorder (HCC)     Depressed     H/O: hysterectomy 09/22/2020    prolapsed uterus    Obesity        FAMILY HISTORY    Family History   Problem Relation Age of Onset    Arthritis Mother     Hypertension

## 2024-07-24 DIAGNOSIS — B35.9 DERMATOPHYTOSIS: ICD-10-CM

## 2024-07-24 RX ORDER — KETOCONAZOLE 20 MG/G
CREAM TOPICAL
Qty: 60 G | Refills: 1 | Status: SHIPPED | OUTPATIENT
Start: 2024-07-24

## 2024-07-24 NOTE — TELEPHONE ENCOUNTER
Patient calling to receive another refill on ketoconazole, stated she have a red line on her thighs, stated no itching, stated you prescribed her this for a while back for this reason and it is now flared up again and need a refill

## 2025-03-27 ENCOUNTER — OFFICE VISIT (OUTPATIENT)
Dept: FAMILY MEDICINE CLINIC | Age: 55
End: 2025-03-27
Payer: COMMERCIAL

## 2025-03-27 VITALS
DIASTOLIC BLOOD PRESSURE: 88 MMHG | HEIGHT: 66 IN | SYSTOLIC BLOOD PRESSURE: 143 MMHG | HEART RATE: 91 BPM | BODY MASS INDEX: 47.09 KG/M2 | OXYGEN SATURATION: 96 % | WEIGHT: 293 LBS

## 2025-03-27 DIAGNOSIS — J06.9 ACUTE URI: Primary | ICD-10-CM

## 2025-03-27 DIAGNOSIS — N81.10 BLADDER PROLAPSE, FEMALE, ACQUIRED: ICD-10-CM

## 2025-03-27 PROCEDURE — 99214 OFFICE O/P EST MOD 30 MIN: CPT | Performed by: FAMILY MEDICINE

## 2025-03-27 RX ORDER — DOXYCYCLINE HYCLATE 100 MG
100 TABLET ORAL 2 TIMES DAILY
Qty: 20 TABLET | Refills: 0 | Status: SHIPPED | OUTPATIENT
Start: 2025-03-27 | End: 2025-04-06

## 2025-03-27 RX ORDER — BENZONATATE 200 MG/1
200 CAPSULE ORAL 3 TIMES DAILY PRN
Qty: 30 CAPSULE | Refills: 0 | Status: SHIPPED | OUTPATIENT
Start: 2025-03-27 | End: 2025-04-06

## 2025-03-27 RX ORDER — GUAIFENESIN 600 MG/1
1200 TABLET, EXTENDED RELEASE ORAL 2 TIMES DAILY
Qty: 40 TABLET | Refills: 0 | Status: SHIPPED | OUTPATIENT
Start: 2025-03-27 | End: 2025-04-06

## 2025-03-27 SDOH — ECONOMIC STABILITY: FOOD INSECURITY: WITHIN THE PAST 12 MONTHS, YOU WORRIED THAT YOUR FOOD WOULD RUN OUT BEFORE YOU GOT MONEY TO BUY MORE.: NEVER TRUE

## 2025-03-27 SDOH — ECONOMIC STABILITY: FOOD INSECURITY: WITHIN THE PAST 12 MONTHS, THE FOOD YOU BOUGHT JUST DIDN'T LAST AND YOU DIDN'T HAVE MONEY TO GET MORE.: NEVER TRUE

## 2025-03-27 ASSESSMENT — PATIENT HEALTH QUESTIONNAIRE - PHQ9
9. THOUGHTS THAT YOU WOULD BE BETTER OFF DEAD, OR OF HURTING YOURSELF: NOT AT ALL
SUM OF ALL RESPONSES TO PHQ QUESTIONS 1-9: 2
10. IF YOU CHECKED OFF ANY PROBLEMS, HOW DIFFICULT HAVE THESE PROBLEMS MADE IT FOR YOU TO DO YOUR WORK, TAKE CARE OF THINGS AT HOME, OR GET ALONG WITH OTHER PEOPLE: NOT DIFFICULT AT ALL
7. TROUBLE CONCENTRATING ON THINGS, SUCH AS READING THE NEWSPAPER OR WATCHING TELEVISION: MORE THAN HALF THE DAYS
4. FEELING TIRED OR HAVING LITTLE ENERGY: NOT AT ALL
8. MOVING OR SPEAKING SO SLOWLY THAT OTHER PEOPLE COULD HAVE NOTICED. OR THE OPPOSITE, BEING SO FIGETY OR RESTLESS THAT YOU HAVE BEEN MOVING AROUND A LOT MORE THAN USUAL: NOT AT ALL
SUM OF ALL RESPONSES TO PHQ QUESTIONS 1-9: 2
2. FEELING DOWN, DEPRESSED OR HOPELESS: NOT AT ALL
3. TROUBLE FALLING OR STAYING ASLEEP: NOT AT ALL
1. LITTLE INTEREST OR PLEASURE IN DOING THINGS: NOT AT ALL
5. POOR APPETITE OR OVEREATING: NOT AT ALL
SUM OF ALL RESPONSES TO PHQ QUESTIONS 1-9: 2
6. FEELING BAD ABOUT YOURSELF - OR THAT YOU ARE A FAILURE OR HAVE LET YOURSELF OR YOUR FAMILY DOWN: NOT AT ALL
SUM OF ALL RESPONSES TO PHQ QUESTIONS 1-9: 2

## 2025-03-27 NOTE — PROGRESS NOTES
[facility]  [facility]  [address]  [address]  [facility] [address]  Dept: [phone]      Vy Hawthorne is a 54 y.o. female who presents today for follow up on her  medical conditions as noted below.      Chief Complaint   Patient presents with    Congestion       Patient Active Problem List:     Schizophrenia (HCC)     Suicidal ideation     Schizoaffective disorder, depressive type (HCC)     Obesity     H/O: hysterectomy     Bipolar 1 disorder (HCC)     Venous hypertension     Varicose veins of bilateral lower extremities with pain     Stress incontinence in female     Pelvic pain     Major depression, single episode     Generalized anxiety disorder     Bunion, right     Acute cholecystitis     Other chest pain     Heart palpitations     Closed fracture of right distal radius     Prolapse of anterior vaginal wall     Past Medical History:   Diagnosis Date    Bipolar 1 disorder (HCC)     Depressed     H/O: hysterectomy 09/22/2020    prolapsed uterus    Obesity       Past Surgical History:   Procedure Laterality Date    APPENDECTOMY      CHOLECYSTECTOMY      HERNIA REPAIR      HYSTERECTOMY (CERVIX STATUS UNKNOWN)      for bleeding    LAPAROSCOPY       Family History   Problem Relation Age of Onset    Arthritis Mother     Hypertension Mother     Coronary Art Dis Father     Cancer Sister         rectal    Breast Cancer Neg Hx        Current Outpatient Medications   Medication Sig Dispense Refill    COVID-19 Antibody Test (RAPID RESPONSE COVID-19) KIT 1 kit by In Vitro route once for 1 dose 1 kit 2    doxycycline hyclate (VIBRA-TABS) 100 MG tablet Take 1 tablet by mouth 2 times daily for 10 days 20 tablet 0    benzonatate (TESSALON) 200 MG capsule Take 1 capsule by mouth 3 times daily as needed for Cough 30 capsule 0    guaiFENesin (MUCINEX) 600 MG extended release tablet Take 2 tablets by mouth 2 times daily for 10 days 40 tablet 0    ketoconazole (NIZORAL) 2 %

## 2025-05-22 ENCOUNTER — TELEPHONE (OUTPATIENT)
Dept: FAMILY MEDICINE CLINIC | Age: 55
End: 2025-05-22

## 2025-05-22 NOTE — TELEPHONE ENCOUNTER
----- Message from Brianna SCOTTIE sent at 5/22/2025 10:50 AM EDT -----  Regarding: ECC Escalation To Practice  ECC Escalation To Practice      Type of Escalation: Acute Care Symptom  --------------------------------------------------------------------------------------------------------------------------    Information for Provider:  Patient is looking for appointment for: Symptom  persistent cough   Reasons for Message: Patient disconnected     Additional Information / patient experience persistent cough for a couple of days  want to get an appointment on 5/27/2025 Tuesday   --------------------------------------------------------------------------------------------------------------------------    Relationship to Patient: Self  Call Back Info: OK to leave message on voicemail  Preferred Call Back Number: Phone [phone] (home)

## 2025-08-27 ENCOUNTER — TELEMEDICINE (OUTPATIENT)
Age: 55
End: 2025-08-27
Payer: COMMERCIAL

## 2025-08-27 DIAGNOSIS — R20.8: Primary | ICD-10-CM

## 2025-08-27 PROCEDURE — 99212 OFFICE O/P EST SF 10 MIN: CPT | Performed by: NURSE PRACTITIONER

## 2025-08-27 ASSESSMENT — ENCOUNTER SYMPTOMS
SORE THROAT: 0
COUGH: 0
ABDOMINAL PAIN: 0
DIARRHEA: 0
RHINORRHEA: 0